# Patient Record
Sex: FEMALE | Race: WHITE | NOT HISPANIC OR LATINO | Employment: OTHER | URBAN - METROPOLITAN AREA
[De-identification: names, ages, dates, MRNs, and addresses within clinical notes are randomized per-mention and may not be internally consistent; named-entity substitution may affect disease eponyms.]

---

## 2017-02-02 ENCOUNTER — HISTORICAL (OUTPATIENT)
Dept: NEUROSURGERY | Facility: CLINIC | Age: 72
End: 2017-02-02

## 2017-02-02 ENCOUNTER — HISTORICAL (OUTPATIENT)
Dept: ADMINISTRATIVE | Facility: HOSPITAL | Age: 72
End: 2017-02-02

## 2017-03-02 ENCOUNTER — HISTORICAL (OUTPATIENT)
Dept: NEUROSURGERY | Facility: CLINIC | Age: 72
End: 2017-03-02

## 2017-03-02 ENCOUNTER — HISTORICAL (OUTPATIENT)
Dept: ADMINISTRATIVE | Facility: HOSPITAL | Age: 72
End: 2017-03-02

## 2017-03-09 ENCOUNTER — HISTORICAL (OUTPATIENT)
Dept: NEUROSURGERY | Facility: CLINIC | Age: 72
End: 2017-03-09

## 2017-03-09 ENCOUNTER — HISTORICAL (OUTPATIENT)
Dept: ADMINISTRATIVE | Facility: HOSPITAL | Age: 72
End: 2017-03-09

## 2017-04-11 ENCOUNTER — HISTORICAL (OUTPATIENT)
Dept: RADIOLOGY | Facility: HOSPITAL | Age: 72
End: 2017-04-11

## 2017-04-12 ENCOUNTER — HISTORICAL (OUTPATIENT)
Dept: INTENSIVE CARE | Facility: HOSPITAL | Age: 72
End: 2017-04-12

## 2017-06-08 ENCOUNTER — HISTORICAL (OUTPATIENT)
Dept: NEUROSURGERY | Facility: CLINIC | Age: 72
End: 2017-06-08

## 2017-06-08 ENCOUNTER — HISTORICAL (OUTPATIENT)
Dept: ADMINISTRATIVE | Facility: HOSPITAL | Age: 72
End: 2017-06-08

## 2017-06-08 LAB
ABS NEUT (OLG): 4.33 X10(3)/MCL (ref 2.1–9.2)
APTT PPP: 27.5 SECOND(S) (ref 20.6–36)
BASOPHILS # BLD AUTO: 0 X10(3)/MCL (ref 0–0.2)
BASOPHILS NFR BLD AUTO: 0 %
BUN SERPL-MCNC: 24 MG/DL (ref 7–18)
CALCIUM SERPL-MCNC: 8.7 MG/DL (ref 8.5–10.1)
CHLORIDE SERPL-SCNC: 99 MMOL/L (ref 98–107)
CO2 SERPL-SCNC: 33 MMOL/L (ref 21–32)
CREAT SERPL-MCNC: 0.92 MG/DL (ref 0.55–1.02)
EOSINOPHIL # BLD AUTO: 0.1 X10(3)/MCL (ref 0–0.9)
EOSINOPHIL NFR BLD AUTO: 2 %
ERYTHROCYTE [DISTWIDTH] IN BLOOD BY AUTOMATED COUNT: 17.9 % (ref 11.5–17)
GLUCOSE SERPL-MCNC: 99 MG/DL (ref 74–106)
HCT VFR BLD AUTO: 31.3 % (ref 37–47)
HGB BLD-MCNC: 9.6 GM/DL (ref 12–16)
INR PPP: 0.98 (ref 0–1.27)
LYMPHOCYTES # BLD AUTO: 1.3 X10(3)/MCL (ref 0.6–4.6)
LYMPHOCYTES NFR BLD AUTO: 20 %
MCH RBC QN AUTO: 21.4 PG (ref 27–31)
MCHC RBC AUTO-ENTMCNC: 30.7 GM/DL (ref 33–36)
MCV RBC AUTO: 69.9 FL (ref 80–94)
MONOCYTES # BLD AUTO: 0.7 X10(3)/MCL (ref 0.1–1.3)
MONOCYTES NFR BLD AUTO: 11 %
NEUTROPHILS # BLD AUTO: 4.33 X10(3)/MCL (ref 1.4–7.9)
NEUTROPHILS NFR BLD AUTO: 67 %
PLATELET # BLD AUTO: 184 X10(3)/MCL (ref 130–400)
PMV BLD AUTO: 9.5 FL (ref 9.4–12.4)
POTASSIUM SERPL-SCNC: 3.6 MMOL/L (ref 3.5–5.1)
PROTHROMBIN TIME: 12.8 SECOND(S) (ref 12.1–14.2)
RBC # BLD AUTO: 4.48 X10(6)/MCL (ref 4.2–5.4)
SODIUM SERPL-SCNC: 139 MMOL/L (ref 136–145)
WBC # SPEC AUTO: 6.4 X10(3)/MCL (ref 4.5–11.5)

## 2017-06-15 ENCOUNTER — HISTORICAL (OUTPATIENT)
Dept: NEUROSURGERY | Facility: CLINIC | Age: 72
End: 2017-06-15

## 2017-06-15 ENCOUNTER — HISTORICAL (OUTPATIENT)
Dept: ADMINISTRATIVE | Facility: HOSPITAL | Age: 72
End: 2017-06-15

## 2017-06-22 ENCOUNTER — HISTORICAL (OUTPATIENT)
Dept: ADMINISTRATIVE | Facility: HOSPITAL | Age: 72
End: 2017-06-22

## 2017-06-22 ENCOUNTER — HISTORICAL (OUTPATIENT)
Dept: NEUROSURGERY | Facility: CLINIC | Age: 72
End: 2017-06-22

## 2018-04-26 ENCOUNTER — HISTORICAL (OUTPATIENT)
Dept: ADMINISTRATIVE | Facility: HOSPITAL | Age: 73
End: 2018-04-26

## 2019-11-26 ENCOUNTER — HISTORICAL (OUTPATIENT)
Dept: SURGERY | Facility: HOSPITAL | Age: 74
End: 2019-11-26

## 2019-11-26 LAB
BUN SERPL-MCNC: 14 MG/DL (ref 7–18)
CALCIUM SERPL-MCNC: 9.2 MG/DL (ref 8.5–10.1)
CHLORIDE SERPL-SCNC: 107 MMOL/L (ref 98–107)
CO2 SERPL-SCNC: 28 MMOL/L (ref 21–32)
CREAT SERPL-MCNC: 0.67 MG/DL (ref 0.55–1.02)
CREAT/UREA NIT SERPL: 20.9
GLUCOSE SERPL-MCNC: 93 MG/DL (ref 74–106)
HCT VFR BLD AUTO: 35.1 % (ref 37–47)
HGB BLD-MCNC: 10.6 GM/DL (ref 12–16)
INR PPP: 1 (ref 0–1.3)
MICROCYTES BLD QL SMEAR: NORMAL
PLATELET # BLD AUTO: 155 X10(3)/MCL (ref 130–400)
PLATELET # BLD EST: NORMAL 10*3/UL
POTASSIUM SERPL-SCNC: 3.9 MMOL/L (ref 3.5–5.1)
PROTHROMBIN TIME: 13.3 SECOND(S) (ref 12–14)
SODIUM SERPL-SCNC: 142 MMOL/L (ref 136–145)

## 2020-01-29 ENCOUNTER — HISTORICAL (OUTPATIENT)
Dept: ADMINISTRATIVE | Facility: HOSPITAL | Age: 75
End: 2020-01-29

## 2020-01-29 LAB
AMPHET UR QL SCN: NEGATIVE
BARBITURATE SCN PRESENT UR: NEGATIVE
BENZODIAZ UR QL SCN: NEGATIVE
CANNABINOIDS UR QL SCN: NEGATIVE
COCAINE UR QL SCN: NEGATIVE
OPIATES UR QL SCN: POSITIVE
PCP UR QL: NEGATIVE
PH UR STRIP.AUTO: 5.5 [PH] (ref 5–7.5)
SP GR FLD REFRACTOMETRY: 1.02 (ref 1–1.03)

## 2020-02-11 LAB
ABS NEUT (OLG): 3.32 X10(3)/MCL (ref 2.1–9.2)
ANISOCYTOSIS BLD QL SMEAR: 1
BASOPHILS # BLD AUTO: 0 X10(3)/MCL (ref 0–0.2)
BASOPHILS NFR BLD AUTO: 0 %
BUN SERPL-MCNC: 20 MG/DL (ref 7–18)
CALCIUM SERPL-MCNC: 8.8 MG/DL (ref 8.5–10.1)
CHLORIDE SERPL-SCNC: 105 MMOL/L (ref 98–107)
CO2 SERPL-SCNC: 33 MMOL/L (ref 21–32)
CREAT SERPL-MCNC: 0.76 MG/DL (ref 0.55–1.02)
CREAT/UREA NIT SERPL: 26.3
DACRYOCYTES BLD QL SMEAR: 1 /MCL (ref 0–1)
EOSINOPHIL # BLD AUTO: 0.3 X10(3)/MCL (ref 0–0.9)
EOSINOPHIL NFR BLD AUTO: 5 %
ERYTHROCYTE [DISTWIDTH] IN BLOOD BY AUTOMATED COUNT: 16.4 % (ref 11.5–17)
GLUCOSE SERPL-MCNC: 94 MG/DL (ref 74–106)
HCT VFR BLD AUTO: 34.2 % (ref 37–47)
HGB BLD-MCNC: 10 GM/DL (ref 12–16)
HYPOCHROMIA BLD QL SMEAR: 1
INR PPP: 1.1 (ref 0–1.3)
LYMPHOCYTES # BLD AUTO: 1.5 X10(3)/MCL (ref 0.6–4.6)
LYMPHOCYTES NFR BLD AUTO: 26 %
MACROCYTES BLD QL SMEAR: 1 /MCL
MCH RBC QN AUTO: 20.4 PG (ref 27–31)
MCHC RBC AUTO-ENTMCNC: 29.2 GM/DL (ref 33–36)
MCV RBC AUTO: 69.8 FL (ref 80–94)
MONOCYTES # BLD AUTO: 0.6 X10(3)/MCL (ref 0.1–1.3)
MONOCYTES NFR BLD AUTO: 10 %
NEUTROPHILS # BLD AUTO: 3.32 X10(3)/MCL (ref 2.1–9.2)
NEUTROPHILS NFR BLD AUTO: 58 %
OVALOCYTES BLD QL SMEAR: 1 (ref 0–0)
PLATELET # BLD AUTO: 156 X10(3)/MCL (ref 130–400)
PLATELET # BLD EST: ADEQUATE 10*3/UL
PMV BLD AUTO: 10.6 FL (ref 7.4–10.4)
POIKILOCYTOSIS BLD QL SMEAR: 1
POLYCHROMASIA BLD QL SMEAR: 1
POTASSIUM SERPL-SCNC: 4 MMOL/L (ref 3.5–5.1)
PROTHROMBIN TIME: 13.4 SECOND(S) (ref 12–14)
RBC # BLD AUTO: 4.9 X10(6)/MCL (ref 4.2–5.4)
RBC MORPH BLD: ABNORMAL
SODIUM SERPL-SCNC: 142 MMOL/L (ref 136–145)
TARGETS BLD QL SMEAR: 1
WBC # SPEC AUTO: 5.7 X10(3)/MCL (ref 4.5–11.5)

## 2020-02-18 ENCOUNTER — HISTORICAL (OUTPATIENT)
Dept: SURGERY | Facility: HOSPITAL | Age: 75
End: 2020-02-18

## 2020-03-17 ENCOUNTER — HISTORICAL (OUTPATIENT)
Dept: SURGERY | Facility: HOSPITAL | Age: 75
End: 2020-03-17

## 2020-03-17 LAB
BUN SERPL-MCNC: 14 MG/DL (ref 7–18)
CALCIUM SERPL-MCNC: 9.1 MG/DL (ref 8.5–10.1)
CHLORIDE SERPL-SCNC: 108 MMOL/L (ref 98–107)
CO2 SERPL-SCNC: 30 MMOL/L (ref 21–32)
CREAT SERPL-MCNC: 0.64 MG/DL (ref 0.55–1.02)
CREAT/UREA NIT SERPL: 21.9
GLUCOSE SERPL-MCNC: 96 MG/DL (ref 74–106)
HCT VFR BLD AUTO: 35.3 % (ref 37–47)
HGB BLD-MCNC: 10.5 GM/DL (ref 12–16)
INR PPP: 1.1 (ref 0–1.3)
POTASSIUM SERPL-SCNC: 4 MMOL/L (ref 3.5–5.1)
PROTHROMBIN TIME: 13.9 SECOND(S) (ref 11.1–13.7)
SODIUM SERPL-SCNC: 143 MMOL/L (ref 136–145)

## 2020-09-22 ENCOUNTER — HISTORICAL (OUTPATIENT)
Dept: SURGERY | Facility: HOSPITAL | Age: 75
End: 2020-09-22

## 2020-09-22 LAB
BUN SERPL-MCNC: 10.3 MG/DL (ref 9.8–20.1)
CALCIUM SERPL-MCNC: 8.7 MG/DL (ref 8.4–10.2)
CHLORIDE SERPL-SCNC: 101 MMOL/L (ref 98–107)
CO2 SERPL-SCNC: 33 MMOL/L (ref 23–31)
CREAT SERPL-MCNC: 0.74 MG/DL (ref 0.55–1.02)
CREAT/UREA NIT SERPL: 14
GLUCOSE SERPL-MCNC: 95 MG/DL (ref 82–115)
HCT VFR BLD AUTO: 34 % (ref 37–47)
HGB BLD-MCNC: 10.3 GM/DL (ref 12–16)
PLATELET # BLD AUTO: 167 X10(3)/MCL (ref 130–400)
POTASSIUM SERPL-SCNC: 3.9 MMOL/L (ref 3.5–5.1)
SODIUM SERPL-SCNC: 141 MMOL/L (ref 136–145)

## 2020-12-07 ENCOUNTER — HISTORICAL (OUTPATIENT)
Dept: RADIOLOGY | Facility: HOSPITAL | Age: 75
End: 2020-12-07

## 2020-12-16 ENCOUNTER — HISTORICAL (OUTPATIENT)
Dept: ADMINISTRATIVE | Facility: HOSPITAL | Age: 75
End: 2020-12-16

## 2020-12-16 LAB
ABS NEUT (OLG): 4.18 X10(3)/MCL (ref 2.1–9.2)
BASOPHILS # BLD AUTO: 0 X10(3)/MCL (ref 0–0.2)
BASOPHILS NFR BLD AUTO: 0 %
EOSINOPHIL # BLD AUTO: 0.2 X10(3)/MCL (ref 0–0.9)
EOSINOPHIL NFR BLD AUTO: 3 %
ERYTHROCYTE [DISTWIDTH] IN BLOOD BY AUTOMATED COUNT: 17.1 % (ref 11.5–17)
HCT VFR BLD AUTO: 33.8 % (ref 37–47)
HGB BLD-MCNC: 10.5 GM/DL (ref 12–16)
IRON SATN MFR SERPL: 22 % (ref 20–50)
IRON SERPL-MCNC: 57 UG/DL (ref 50–170)
LYMPHOCYTES # BLD AUTO: 0.9 X10(3)/MCL (ref 0.6–4.6)
LYMPHOCYTES NFR BLD AUTO: 16 %
MCH RBC QN AUTO: 21.4 PG (ref 27–31)
MCHC RBC AUTO-ENTMCNC: 31.1 GM/DL (ref 33–36)
MCV RBC AUTO: 68.8 FL (ref 80–94)
MONOCYTES # BLD AUTO: 0.4 X10(3)/MCL (ref 0.1–1.3)
MONOCYTES NFR BLD AUTO: 8 %
NEUTROPHILS # BLD AUTO: 4.18 X10(3)/MCL (ref 2.1–9.2)
NEUTROPHILS NFR BLD AUTO: 73 %
PLATELET # BLD AUTO: 160 X10(3)/MCL (ref 130–400)
PMV BLD AUTO: 10.3 FL (ref 9.4–12.4)
RBC # BLD AUTO: 4.91 X10(6)/MCL (ref 4.2–5.4)
TIBC SERPL-MCNC: 204 UG/DL (ref 70–310)
TIBC SERPL-MCNC: 261 UG/DL (ref 250–450)
TRANSFERRIN SERPL-MCNC: 224 MG/DL (ref 173–360)
TSH SERPL-ACNC: 3.12 UIU/ML (ref 0.35–4.94)
WBC # SPEC AUTO: 5.7 X10(3)/MCL (ref 4.5–11.5)

## 2021-02-09 ENCOUNTER — HISTORICAL (OUTPATIENT)
Dept: RADIOLOGY | Facility: HOSPITAL | Age: 76
End: 2021-02-09

## 2021-02-25 LAB
ABS NEUT (OLG): 3.24 X10(3)/MCL (ref 2.1–9.2)
BASOPHILS # BLD AUTO: 0 X10(3)/MCL (ref 0–0.2)
BASOPHILS NFR BLD AUTO: 1 %
BUN SERPL-MCNC: 11 MG/DL (ref 9.8–20.1)
CALCIUM SERPL-MCNC: 8.8 MG/DL (ref 8.4–10.2)
CHLORIDE SERPL-SCNC: 104 MMOL/L (ref 98–107)
CO2 SERPL-SCNC: 29 MMOL/L (ref 23–31)
CREAT SERPL-MCNC: 0.78 MG/DL (ref 0.55–1.02)
CREAT/UREA NIT SERPL: 14
EOSINOPHIL # BLD AUTO: 0.2 X10(3)/MCL (ref 0–0.9)
EOSINOPHIL NFR BLD AUTO: 3 %
ERYTHROCYTE [DISTWIDTH] IN BLOOD BY AUTOMATED COUNT: 17.4 % (ref 11.5–17)
GLUCOSE SERPL-MCNC: 97 MG/DL (ref 82–115)
HCT VFR BLD AUTO: 33 % (ref 37–47)
HGB BLD-MCNC: 10.1 GM/DL (ref 12–16)
IMM GRANULOCYTES # BLD AUTO: 0 10*3/UL
IMM GRANULOCYTES NFR BLD AUTO: 0 %
LYMPHOCYTES # BLD AUTO: 1.4 X10(3)/MCL (ref 0.6–4.6)
LYMPHOCYTES NFR BLD AUTO: 27 %
MCH RBC QN AUTO: 21 PG (ref 27–31)
MCHC RBC AUTO-ENTMCNC: 30.6 GM/DL (ref 33–36)
MCV RBC AUTO: 68.8 FL (ref 80–94)
MONOCYTES # BLD AUTO: 0.4 X10(3)/MCL (ref 0.1–1.3)
MONOCYTES NFR BLD AUTO: 8 %
NEUTROPHILS # BLD AUTO: 3.24 X10(3)/MCL (ref 2.1–9.2)
NEUTROPHILS NFR BLD AUTO: 62 %
PLATELET # BLD AUTO: 151 X10(3)/MCL (ref 130–400)
PMV BLD AUTO: 9.6 FL (ref 9.4–12.4)
POTASSIUM SERPL-SCNC: 4 MMOL/L (ref 3.5–5.1)
RBC # BLD AUTO: 4.8 X10(6)/MCL (ref 4.2–5.4)
SODIUM SERPL-SCNC: 141 MMOL/L (ref 136–145)
WBC # SPEC AUTO: 5.3 X10(3)/MCL (ref 4.5–11.5)

## 2021-03-04 ENCOUNTER — HISTORICAL (OUTPATIENT)
Dept: SURGERY | Facility: HOSPITAL | Age: 76
End: 2021-03-04

## 2021-03-09 ENCOUNTER — HISTORICAL (OUTPATIENT)
Dept: ADMINISTRATIVE | Facility: HOSPITAL | Age: 76
End: 2021-03-09

## 2021-03-09 LAB
ALBUMIN SERPL-MCNC: 3.9 GM/DL (ref 3.4–4.8)
ALBUMIN/GLOB SERPL: 1.5 RATIO (ref 1.1–2)
ALP SERPL-CCNC: 91 UNIT/L (ref 40–150)
ALT SERPL-CCNC: 24 UNIT/L (ref 0–55)
AST SERPL-CCNC: 20 UNIT/L (ref 5–34)
BILIRUB SERPL-MCNC: 0.4 MG/DL
BILIRUBIN DIRECT+TOT PNL SERPL-MCNC: 0.2 MG/DL (ref 0–0.5)
BILIRUBIN DIRECT+TOT PNL SERPL-MCNC: 0.2 MG/DL (ref 0–0.8)
BUN SERPL-MCNC: 11.6 MG/DL (ref 9.8–20.1)
CALCIUM SERPL-MCNC: 8.6 MG/DL (ref 8.4–10.2)
CHLORIDE SERPL-SCNC: 105 MMOL/L (ref 98–107)
CO2 SERPL-SCNC: 30 MMOL/L (ref 23–31)
CREAT SERPL-MCNC: 0.72 MG/DL (ref 0.55–1.02)
GLOBULIN SER-MCNC: 2.6 GM/DL (ref 2.4–3.5)
GLUCOSE SERPL-MCNC: 90 MG/DL (ref 82–115)
POTASSIUM SERPL-SCNC: 3.8 MMOL/L (ref 3.5–5.1)
PROT SERPL-MCNC: 6.5 GM/DL (ref 5.8–7.6)
SODIUM SERPL-SCNC: 142 MMOL/L (ref 136–145)

## 2021-03-18 ENCOUNTER — HISTORICAL (OUTPATIENT)
Dept: RADIOLOGY | Facility: HOSPITAL | Age: 76
End: 2021-03-18

## 2021-04-22 ENCOUNTER — HISTORICAL (OUTPATIENT)
Dept: ADMINISTRATIVE | Facility: HOSPITAL | Age: 76
End: 2021-04-22

## 2021-04-22 LAB
ALBUMIN SERPL-MCNC: 3.8 GM/DL (ref 3.4–4.8)
ALP SERPL-CCNC: 81 UNIT/L (ref 40–150)
ALT SERPL-CCNC: 19 UNIT/L (ref 0–55)
AST SERPL-CCNC: 25 UNIT/L (ref 5–34)
BILIRUB SERPL-MCNC: 0.6 MG/DL
BILIRUBIN DIRECT+TOT PNL SERPL-MCNC: 0.2 MG/DL (ref 0–0.5)
BILIRUBIN DIRECT+TOT PNL SERPL-MCNC: 0.4 MG/DL (ref 0–0.8)
FERRITIN SERPL-MCNC: 130.56 NG/ML (ref 4.63–204)
IRON SATN MFR SERPL: 30 % (ref 20–50)
IRON SERPL-MCNC: 75 UG/DL (ref 50–170)
LIVER PROFILE INTERP: NORMAL
PROT SERPL-MCNC: 5.8 GM/DL (ref 5.8–7.6)
TIBC SERPL-MCNC: 173 UG/DL (ref 70–310)
TIBC SERPL-MCNC: 248 UG/DL (ref 250–450)
TRANSFERRIN SERPL-MCNC: 205 MG/DL (ref 173–360)

## 2021-04-29 ENCOUNTER — HISTORICAL (OUTPATIENT)
Dept: ADMINISTRATIVE | Facility: HOSPITAL | Age: 76
End: 2021-04-29

## 2021-04-29 ENCOUNTER — HISTORICAL (OUTPATIENT)
Dept: SURGERY | Facility: HOSPITAL | Age: 76
End: 2021-04-29

## 2021-04-29 LAB
BUN SERPL-MCNC: 12.6 MG/DL (ref 9.8–20.1)
CALCIUM SERPL-MCNC: 9.2 MG/DL (ref 8.4–10.2)
CHLORIDE SERPL-SCNC: 110 MMOL/L (ref 98–107)
CO2 SERPL-SCNC: 27 MMOL/L (ref 23–31)
COLOR STL: NORMAL
CONSISTENCY STL: NORMAL
CREAT SERPL-MCNC: 0.76 MG/DL (ref 0.55–1.02)
CREAT/UREA NIT SERPL: 17
GLUCOSE SERPL-MCNC: 105 MG/DL (ref 82–115)
HCT VFR BLD AUTO: 33.2 % (ref 37–47)
HEMOCCULT SP1 STL QL: NEGATIVE
HEMOCCULT SP2 STL QL: NEGATIVE
HGB BLD-MCNC: 10 GM/DL (ref 12–16)
INR PPP: 1 (ref 0–1.3)
PLATELET # BLD AUTO: 149 X10(3)/MCL (ref 130–400)
POTASSIUM SERPL-SCNC: 4.7 MMOL/L (ref 3.5–5.1)
PROTHROMBIN TIME: 13.3 SECOND(S) (ref 11.1–13.7)
SODIUM SERPL-SCNC: 144 MMOL/L (ref 136–145)

## 2021-05-06 ENCOUNTER — HISTORICAL (OUTPATIENT)
Dept: SURGERY | Facility: HOSPITAL | Age: 76
End: 2021-05-06

## 2021-07-12 LAB
ABS NEUT (OLG): 4 X10(3)/MCL (ref 2.1–9.2)
BASOPHILS # BLD AUTO: 0 X10(3)/MCL (ref 0–0.2)
BASOPHILS NFR BLD AUTO: 1 %
BUN SERPL-MCNC: 15.5 MG/DL (ref 9.8–20.1)
CALCIUM SERPL-MCNC: 9 MG/DL (ref 8.4–10.2)
CHLORIDE SERPL-SCNC: 104 MMOL/L (ref 98–107)
CO2 SERPL-SCNC: 30 MMOL/L (ref 23–31)
CREAT SERPL-MCNC: 1.02 MG/DL (ref 0.55–1.02)
CREAT/UREA NIT SERPL: 15
EOSINOPHIL # BLD AUTO: 0.2 X10(3)/MCL (ref 0–0.9)
EOSINOPHIL NFR BLD AUTO: 4 %
ERYTHROCYTE [DISTWIDTH] IN BLOOD BY AUTOMATED COUNT: 17.1 % (ref 11.5–17)
GLUCOSE SERPL-MCNC: 108 MG/DL (ref 82–115)
HCT VFR BLD AUTO: 34.6 % (ref 37–47)
HGB BLD-MCNC: 10.5 GM/DL (ref 12–16)
LYMPHOCYTES # BLD AUTO: 1 X10(3)/MCL (ref 0.6–4.6)
LYMPHOCYTES NFR BLD AUTO: 18 %
MCH RBC QN AUTO: 21.4 PG (ref 27–31)
MCHC RBC AUTO-ENTMCNC: 30.3 GM/DL (ref 33–36)
MCV RBC AUTO: 70.6 FL (ref 80–94)
MONOCYTES # BLD AUTO: 0.4 X10(3)/MCL (ref 0.1–1.3)
MONOCYTES NFR BLD AUTO: 8 %
NEUTROPHILS # BLD AUTO: 4 X10(3)/MCL (ref 2.1–9.2)
NEUTROPHILS NFR BLD AUTO: 70 %
PLATELET # BLD AUTO: 150 X10(3)/MCL (ref 130–400)
PMV BLD AUTO: 10.8 FL (ref 9.4–12.4)
POTASSIUM SERPL-SCNC: 4.2 MMOL/L (ref 3.5–5.1)
RBC # BLD AUTO: 4.9 X10(6)/MCL (ref 4.2–5.4)
SODIUM SERPL-SCNC: 142 MMOL/L (ref 136–145)
WBC # SPEC AUTO: 5.7 X10(3)/MCL (ref 4.5–11.5)

## 2021-07-13 ENCOUNTER — HISTORICAL (OUTPATIENT)
Dept: SURGERY | Facility: HOSPITAL | Age: 76
End: 2021-07-13

## 2022-03-25 ENCOUNTER — HISTORICAL (OUTPATIENT)
Dept: PREADMISSION TESTING | Facility: HOSPITAL | Age: 77
End: 2022-03-25

## 2022-03-25 ENCOUNTER — HISTORICAL (OUTPATIENT)
Dept: ADMINISTRATIVE | Facility: HOSPITAL | Age: 77
End: 2022-03-25

## 2022-03-25 LAB
ABS NEUT (OLG): 5.74 (ref 2.1–9.2)
BASOPHILS # BLD AUTO: 0 10*3/UL (ref 0–0.2)
BASOPHILS NFR BLD AUTO: 1 %
BUN SERPL-MCNC: 18 MG/DL (ref 9.8–20.1)
CALCIUM SERPL-MCNC: 8.9 MG/DL (ref 8.7–10.5)
CHLORIDE SERPL-SCNC: 109 MMOL/L (ref 98–107)
CO2 SERPL-SCNC: 29 MMOL/L (ref 23–31)
CREAT SERPL-MCNC: 0.78 MG/DL (ref 0.55–1.02)
CREAT/UREA NIT SERPL: 23
EOSINOPHIL # BLD AUTO: 0.1 10*3/UL (ref 0–0.9)
EOSINOPHIL NFR BLD AUTO: 2 %
ERYTHROCYTE [DISTWIDTH] IN BLOOD BY AUTOMATED COUNT: 18 % (ref 11.5–17)
GLUCOSE SERPL-MCNC: 91 MG/DL (ref 82–115)
HCT VFR BLD AUTO: 36.7 % (ref 37–47)
HEMOLYSIS INTERF INDEX SERPL-ACNC: 2
HGB BLD-MCNC: 11.1 G/DL (ref 12–16)
ICTERIC INTERF INDEX SERPL-ACNC: 1
LIPEMIC INTERF INDEX SERPL-ACNC: 4
LYMPHOCYTES # BLD AUTO: 1.1 10*3/UL (ref 0.6–4.6)
LYMPHOCYTES NFR BLD AUTO: 14 %
MANUAL DIFF? (OHS): NO
MCH RBC QN AUTO: 20.7 PG (ref 27–31)
MCHC RBC AUTO-ENTMCNC: 30.2 G/DL (ref 33–36)
MCV RBC AUTO: 68.6 FL (ref 80–94)
MONOCYTES # BLD AUTO: 0.6 10*3/UL (ref 0.1–1.3)
MONOCYTES NFR BLD AUTO: 7 %
NEUTROPHILS # BLD AUTO: 5.74 10*3/UL (ref 2.1–9.2)
NEUTROPHILS NFR BLD AUTO: 76 %
PLATELET # BLD AUTO: 168 10*3/UL (ref 130–400)
PMV BLD AUTO: 9.4 FL (ref 9.4–12.4)
POS ERR1 (OHS): NORMAL
POTASSIUM SERPL-SCNC: 4.1 MMOL/L (ref 3.5–5.1)
RBC # BLD AUTO: 5.35 10*6/UL (ref 4.2–5.4)
SODIUM SERPL-SCNC: 145 MMOL/L (ref 136–145)
WBC # SPEC AUTO: 7.6 10*3/UL (ref 4.5–11.5)

## 2022-03-29 ENCOUNTER — HISTORICAL (OUTPATIENT)
Dept: SURGERY | Facility: HOSPITAL | Age: 77
End: 2022-03-29

## 2022-04-11 ENCOUNTER — HISTORICAL (OUTPATIENT)
Dept: ADMINISTRATIVE | Facility: HOSPITAL | Age: 77
End: 2022-04-11
Payer: MEDICARE

## 2022-04-28 VITALS
BODY MASS INDEX: 22.19 KG/M2 | HEIGHT: 63 IN | WEIGHT: 125.25 LBS | SYSTOLIC BLOOD PRESSURE: 148 MMHG | DIASTOLIC BLOOD PRESSURE: 78 MMHG

## 2022-04-30 NOTE — OP NOTE
Patient:   Elizabeth Bruno             MRN: [id number]            FIN: [id number]               Age:   75 years     Sex:  Female     :  1945   Associated Diagnoses:   None   Author:   Yusuf Calero MD      DATE OF SURGERY:    2020    SURGEON:  Yusuf Calero MD    PREOPERATIVE DIAGNOSIS:  Lumbar radiculopathy.    POSTOPERATIVE DIAGNOSIS:  Lumbar radiculopathy.    PROCEDURE PERFORMED:  Fluoroscopically-guided transforaminal lumbar epidural injections at left L3-4 and left L5-S1.    EQUIPMENT USED:  Epidural tray.    DESCRIPTION OF PROCEDURE:  Following informed consent, the patient was prepped and draped in the usual sterile fashion.  I infiltrated the tissue overlying L3-4 and L5-S1 with local anesthetic.  Under fluoroscopic guidance, I advanced a 22-gauge 3.5 inch BD spinal needle into the epidural space via left transforaminal approaches.  Positioning was confirmed at each location with administration of contrast.  I then instilled divided between the 2 needles a combination of 160 mg Depo-Medrol and 1 mL of 5% dextrose in water.  I removed the needles and applied sterile dressings.  The patient tolerated the procedure well without apparent complication.    IMPRESSION:  Successful fluoroscopically-guided transforaminal lumbar epidural injection at left L3-4 and left L5-S1.

## 2022-04-30 NOTE — OP NOTE
Patient:   Elizabeth Bruno             MRN: [id number]            FIN: [id number]               Age:   76 years     Sex:  Female     :  1945   Associated Diagnoses:   None   Author:   Yusuf Calero MD      DATE OF SURGERY:    21    SURGEON:  Yusuf Calero MD    PREOPERATIVE DIAGNOSIS:  Lumbar spondylosis.    POSTOPERATIVE DIAGNOSIS:  Lumbar spondylosis.    PROCEDURE PERFORMED:  Fluoroscopically-guided medial branch blocks at bilateral  L5, S1    EQUIPMENT USED:  Epidural tray.    DESCRIPTION OF PROCEDURE:  Following informed consent, the patient was prepped and draped in the usual sterile fashion.  I infiltrated the tissue overlying L5, S1 with local anesthetic.  Under fluoroscopic guidance, I advanced four 22-gauge 3.5 inch BD spinal needles, performing medial branch blocks using 160mg 2% lidocaine.  I removed the needles and applied sterile dressings.  The patient tolerated the procedure well without apparent complication.    IMPRESSION:  Successful fluoroscopically-guided medial branch blocks at bilateral L5, S1

## 2022-04-30 NOTE — OP NOTE
Patient:   Elizabeth Bruno             MRN: [id number]            FIN: [id number]               Age:   74 years     Sex:  Female     :  1945   Associated Diagnoses:   None   Author:   Yusuf Calero MD      DATE OF SURGERY:  19        SURGEON:  Yusuf Calero MD    PREOPERATIVE DIAGNOSIS:  Cervical radiculopathy.    POSTOPERATIVE DIAGNOSE:  Cervical radiculopathy.    PROCEDURE PERFORMED:  Fluoroscopically-guided intralaminar cervical epidural injection at C5-6.    EQUIPMENT USED:  Epidural tray.    DETAILED DESCRIPTION:  Following informed consent, the patient was prepped and draped in the usual sterile fashion.  I infiltrated the tissue overlying C5-6 with local anesthetic.  Under fluoroscopic guidance, I advanced a 25-gauge 3.5 inch BD spinal needle into the epidural space.  Positioning was confirmed with administration of contrast.  I then instilled a combination of 160 mg Depo-Medrol and 1 mL of 5% dextrose in water.  I removed the needle and applied a sterile dressing.  The patient tolerated the procedure well without apparent complication.    IMPRESSION:  Successful fluoroscopically-guided intralaminar cervical epidural injection at C5-6.

## 2022-04-30 NOTE — OP NOTE
Patient:   Elizabeth Bruno             MRN: [id number]            FIN: [id number]               Age:   74 years     Sex:  Female     :  1945   Associated Diagnoses:   None   Author:   Yusuf Calero MD      DATE OF SURGERY:    2019    SURGEON:  Yusuf Calero MD    PREOPERATIVE DIAGNOSIS:  Lumbar radiculopathy.    POSTOPERATIVE DIAGNOSIS:  Lumbar radiculopathy.    PROCEDURE PERFORMED:  Fluoroscopically-guided transforaminal lumbar epidural injections at left L3-4 and left L5-S1.    EQUIPMENT USED:  Epidural tray.    DESCRIPTION OF PROCEDURE:  Following informed consent, the patient was prepped and draped in the usual sterile fashion.  I infiltrated the tissue overlying L3-4 and L5-S1 with local anesthetic.  Under fluoroscopic guidance, I advanced a 22-gauge 3.5 inch BD spinal needle into the epidural space via left transforaminal approaches.  Positioning was confirmed at each location with administration of contrast.  I then instilled divided between the 2 needles a combination of 160 mg Depo-Medrol and 1 mL of 5% dextrose in water.  I removed the needles and applied sterile dressings.  The patient tolerated the procedure well without apparent complication.    IMPRESSION:  Successful fluoroscopically-guided transforaminal lumbar epidural injection at left L3-4 and left L5-S1.

## 2022-04-30 NOTE — OP NOTE
DATE OF SURGERY:    06/22/2017    SURGEON:  Yusuf Calero MD    PREOPERATIVE DIAGNOSIS:  Lumbosacral pain.    POSTOPERATIVE DIAGNOSIS:  Lumbosacral pain.    PROCEDURE PERFORMED:  Fluoroscopically-guided transforaminal lumbar epidural injections at bilateral L5-S1.    EQUIPMENT USED:  Epidural tray.    DETAILED DESCRIPTION:  Following informed consent, the patient was prepped and draped in the usual sterile fashion.  I infiltrated the tissue overlying L5-S1 with local anesthetic.  Under fluoroscopic guidance, I advanced a 22-gauge 3.5 inch BD spinal needle in the epidural space via bilateral transforaminal approaches.  Positioning was confirmed at each location with administration of contrast.  I then instilled, divided between the two needles, a combination of 160 mg Depo-Medrol and 1 mL of 5% dextrose in water.  I removed the needles and applied a sterile dressing.  The patient tolerated the procedure well.  No apparent complication.    IMPRESSION:  Successful fluoroscopically-guided transforaminal lumbar epidural injections at bilateral L5-S1.        ______________________________  Yusuf Calero MD    DP/UD  DD:  06/22/2017  Time:  08:50AM  DT:  06/22/2017  Time:  10:22AM  Job #:  [id number]

## 2022-04-30 NOTE — OP NOTE
DATE OF SURGERY:    02/18/2020    SURGEON:  Yusuf Calero MD    PREOPERATIVE DIAGNOSIS:  Chronic pain syndrome.    POSTOPERATIVE DIAGNOSIS:  Chronic pain syndrome.    PROCEDURE PERFORMED:  Fluoroscopically-guided placement of 2 spinal cord stimulator leads under anesthesia for programming and trial.    EQUIPMENT USED:  Procyrionro stimulator kit.    DETAILED DESCRIPTION:  Following informed consent, and with the patient under general endotracheal anesthesia, she was prepped and draped in the usual sterile fashion.  I infiltrated the tissue overlying L2-3 with local anesthetic.  Under fluoroscopic guidance, I entered the epidural space at L1-2 using a 14-gauge Tuohy curved tip spinal needle. I introduced stimulator lead and advanced it to the top of T8.  I then infiltrated the tissue overlying T2-3 with local anesthetic.  Under fluoroscopic guidance, attempts were made at T2-3 and T3-4 to access the epidural space, but the lead did not track well in this location.  I then infiltrated the tissue overlying T11-12 with local anesthetic.  Under fluoroscopic guidance, I entered the epidural space at T11-12 with a 14-gauge Touhy curved tip spinal needle.  I introduced the stimulator lead and advanced it to the top of C2.  AP and lateral views demonstrated good lead positioning.  The needle and stylet were carefully removed while keeping this lead in place.  I then removed the lead at T8 and attempted epidural puncture again at T11-12, entering the epidural space with the lead.  Good tracking was not achieved at this level.  I then entered the epidural space at L1-2 with the Touhy needle.  I introduced the stimulator lead and advanced it to the top of T8.  Final AP and lateral views demonstrated good lead positioning.  I carefully removed the stylet and needle while keeping the lead in place.  The leads were then connected to the generator for programming.  Everything was secured with sterile dressing.  The patient  tolerated the procedure well.  There were no apparent complications.        ______________________________  MD CHARLIE Rosa/SK  DD:  02/18/2020  Time:  12:29PM  DT:  02/18/2020  Time:  12:46PM  Job #:  [id number]

## 2022-04-30 NOTE — OP NOTE
DATE OF SURGERY:    06/08/2017    SURGEON:  Yusuf Calero MD    PREOPERATIVE DIAGNOSIS:  Lumbosacral pain.    POSTOPERATIVE DIAGNOSIS:  Lumbosacral pain.    PROCEDURE:  Fluoroscopically guided transforaminal lumbar epidural injections at bilateral L5-S1.    EQUIPMENT USED:  Epidural tray.    DETAILED DESCRIPTION:  Following informed consent, the patient was prepped and draped in the usual sterile fashion.  I infiltrated the tissue overlying L5-S1 with local anesthetic.  Under fluoroscopic guidance, I advanced a 22 gauge 3.5 inch BD spinal needle into the epidural space via bilateral transforaminal approaches.  Positioning was confirmed with administration of contrast.  I then instilled divided between the two needles a combination of 160 mg Depo-Medrol and 1 ml of 5% dextrose in water.  I removed the needle and applied a sterile dressing.  The patient tolerated the procedure well without apparent complication.    IMPRESSION:  Successful fluoroscopically guided transforaminal lumbar epidural injections at bilateral L5-S1.        ______________________________  MD CHARLIE Rosa/TRUDY  DD:  06/08/2017  Time:  09:02AM  DT:  06/08/2017  Time:  11:46AM  Job #:  [id number]

## 2022-04-30 NOTE — OP NOTE
Patient:   Elizabeth Bruno             MRN: [id number]            FIN: [id number]               Age:   75 years     Sex:  Female     :  1945   Associated Diagnoses:   None   Author:   Yusuf Calero MD      DATE OF SURGERY:  20      SURGEON:  Yusuf Calero MD    PREOPERATIVE DIAGNOSIS:  Chronic pain syndrome.    POSTOPERATIVE DIAGNOSIS:  Chronic pain syndrome.    PROCEDURES PERFORMED:    1. Percutaneous implantation of neurostimulator electrode arrays.  2. Insertion of spinal neurostimulator pulse generator.  3. Electronic analysis of implanted neurostimulator pulse generator system with intraoperative and subsequent programming.    COMPLICATIONS:  None.    INDICATIONS FOR PROCEDURE:  This is a  75 year-old female with a history of chronic pain syndrome, who successfully underwent spinal cord stimulator trial and elected to proceed with implantation.    EQUIPMENT USED:  [employer] stimulator kit.    DETAILED DESCRIPTION:  Following informed consent and with the patient under general endotracheal anesthesia, he was prepped and draped in the usual sterile fashion.  The tissue was infiltrated with local anesthetic.  Under fluoroscopic guidance, attempts were made at multiple levels to access the epidural space.  Ultimately, I advanced a 14-gauge Tuohy curved-tip spinal needle entering the epidural space at T11-12.  I introduced the stimulator lead and advanced it to the top of C2.  An incision was made over the needle.  Hemostasis was achieved.  The stylet and needle were carefully removed while keeping the lead in place.  An anchoring attachment was placed over the lead, and this was secured with 0 silk suture.  A second 14-gauge Touhy curved-tip spinal needle was then introduced and used to enter into the epidural space at T11-12.  A second stimulator lead was introduced and advanced to the top of T8.  The stylet and needle were carefully removed while keeping the lead in place.  An  anchoring attachment was placed over this lead, and this was secured with 0 silk suture.   The anchoring attachments were then tightened with the supplied screwdriver.  Final fluoroscopic AP and lateral views were obtained of the leads to confirm placement.  A left paramedian incision was then made and dissection was carried down approximately 1 cm deep, using a finger sweep technique to create a pocket.  The neurostimulator pulse generator was temporarily placed in the pocket to confirm positioning.  This was removed and cleaned.  The leads were tunneled subcutaneously to the pocket.  These were connected to the neurostimulator pulse generator, and analysis of the generator confirmed proper functioning.  The leads connected to the generator were secured using the supplied screwdriver.  The neurostimulator pulse generator was placed in the pocket, and both wounds were copiously irrigated with Ancef and bacitracin.  Latoya was applied to the wounds.  Standard layer closure was performed at both sites with one layer of 0 Vicryl followed by one layer of Stratafix.  Exofin was applied to the wounds.  The patient tolerated the procedure well.  There were no apparent complications.      ______________________________  Yusuf Calero MD

## 2022-04-30 NOTE — OP NOTE
DATE OF SURGERY:    06/15/2017    SURGEON:  Yusuf Calero MD    PREOPERATIVE DIAGNOSIS:  Lumbosacral pain pain.    POSTOPERATIVE DIAGNOSIS:  Lumbosacral pain pain.    PROCEDURE:  Fluoroscopically guided transforaminal lumbar epidural injections at bilateral L5-S1.    EQUIPMENT USED:  Epidural tray.    PROCEDURE IN DETAIL:  Following informed consent, the patient was prepped and draped in the usual sterile fashion.  I infiltrated the tissue overlying L5-S1 with local anesthetic.  Under fluoroscopic guidance, I advanced a 22-gauge, 3.5 inch BD spinal needle into the epidural space via bilateral transforaminal approaches.  Positioning was confirmed at each location with administration of contrast.  I then instilled, divided between the two needles, a combination of 160 mg of Depo-Medrol and 1 ml of 5% dextrose in water.  I removed the needles and applied a sterile dressing.  The patient tolerated the procedure well without apparent complication.    IMPRESSION:  Successful fluoroscopically guided transforaminal lumbar epidural injections at bilateral L5-S1.        ______________________________  Yusuf Calero MD    DP/CS  DD:  06/15/2017  Time:  09:25AM  DT:  06/15/2017  Time:  01:48PM  Job #:  [id number]

## 2022-04-30 NOTE — OP NOTE
Patient:   Elizabeth Bruno             MRN: [id number]            FIN: [id number]               Age:   76 years     Sex:  Female     :  1945   Associated Diagnoses:   None   Author:   Yusuf Calero MD      DATE OF SURGERY:    21    SURGEON:  Yusuf Calero MD    PREOPERATIVE DIAGNOSIS:  Lumbar spondylosis.    POSTOPERATIVE DIAGNOSIS:  Lumbar spondylosis.    PROCEDURE PERFORMED:  Fluoroscopically-guided medial branch blocks at bilateral  L5, S1    EQUIPMENT USED:  Epidural tray.    DESCRIPTION OF PROCEDURE:  Following informed consent, the patient was prepped and draped in the usual sterile fashion.  I infiltrated the tissue overlying L5, S1 with local anesthetic.  Under fluoroscopic guidance, I advanced four 22-gauge 3.5 inch BD spinal needles, performing medial branch blocks using 160mg 2% lidocaine.  I removed the needles and applied sterile dressings.  The patient tolerated the procedure well without apparent complication.    IMPRESSION:  Successful fluoroscopically-guided medial branch blocks at bilateral L5, S1

## 2022-04-30 NOTE — OP NOTE
Patient:   Elizabeth Bruno             MRN: [id number]            FIN: [id number]               Age:   76 years     Sex:  Female     :  1945   Associated Diagnoses:   None   Author:   Yusuf Calero MD      DATE OF SURGERY:    2021    SURGEON:  Yusuf Calero MD    PREOPERATIVE DIAGNOSIS:  Lumbar radiculopathy.    POSTOPERATIVE DIAGNOSIS:  Lumbar radiculopathy.    PROCEDURE PERFORMED:  Fluoroscopically-guided transforaminal lumbar epidural injections at left L1-2,  left L3-4 and left L5-S1.    EQUIPMENT USED:  Epidural tray.    DESCRIPTION OF PROCEDURE:  Following informed consent, the patient was prepped and draped in the usual sterile fashion.  I infiltrated the tissue overlying L1-2,  left L3-4 and left L5-S1 with local anesthetic.  Under fluoroscopic guidance, I advanced a 22-gauge 3.5 inch BD spinal needle into the epidural space via left transforaminal approaches.  Positioning was confirmed at each location with administration of contrast.  I then instilled divided between the 3 needles a combination of 160 mg Depo-Medrol and 1 mL of 5% dextrose in water.  I removed the needles and applied sterile dressings.  The patient tolerated the procedure well without apparent complication.    IMPRESSION:  Successful fluoroscopically-guided transforaminal lumbar epidural injection at left L1-2,  left L3-4 and left L5-S1.

## 2022-04-30 NOTE — OP NOTE
Patient:   Elizabeth Bruno             MRN: [id number]            FIN: [id number]               Age:   76 years     Sex:  Female     :  1945   Associated Diagnoses:   None   Author:   Yusuf Calero MD      DATE OF SURGERY:    21    SURGEON:  Yusuf Calero MD    PREOPERATIVE DIAGNOSIS:  Lumbar spondylosis.    POSTOPERATIVE DIAGNOSIS:  Lumbar spondylosis.    PROCEDURE:  Fluoroscopically guided radiofrequency ablation at bilateral L5, S1.    PROCEDURE IN DETAIL:  Following informed consent, and with the patient under general anesthesia, the patient was prepped and draped in usual sterile fashion.  The skin and subcutaneous tissue were anesthetized.  Following this I used an 18 gauge angulated 100 mm exposed tip needle at each level, correctly placing them under fluoroscopic guidance at the junction of the SAP and transverse process.  After stimulating the medial branch nerve at each level without evidence of motor signs, I performed radiofrequency ablation of each level, heating the affected nerves to 80 degrees centigrade for 90 seconds.  A total of four sites were heated and treated.  I removed the needles and applied sterile dressings.  The patient tolerated the procedure well without apparent complications.    IMPRESSION:  Successful fluoroscopically guided radiofrequency ablation at bilateral L5, S1.      _____________________________  Yusuf Calero MD

## 2022-05-14 NOTE — OP NOTE
Patient:   Elizabeth Bruno             MRN: [id number]            FIN: [id number]               Age:   77 years     Sex:  Female     :  1945   Associated Diagnoses:   None   Author:   Yusuf Calero MD      DATE OF SURGERY:    22    SURGEON:  Yusuf Calero MD    PREOPERATIVE DIAGNOSIS:  Lumbar spondylosis.    POSTOPERATIVE DIAGNOSIS:  Lumbar spondylosis.    PROCEDURE:  Fluoroscopically guided radiofrequency ablation at bilateral L5, S1.    PROCEDURE IN DETAIL:  Following informed consent, and with the patient under general anesthesia, the patient was prepped and draped in usual sterile fashion.  The skin and subcutaneous tissue were anesthetized.  Following this I used an 18 gauge angulated 150 mm exposed tip needle at each level, correctly placing them under fluoroscopic guidance at the junction of the SAP and transverse process.  After stimulating the medial branch nerve at each level without evidence of motor signs, I performed radiofrequency ablation of each level, heating the affected nerves to 80 degrees centigrade for 90 seconds.  A total of four sites were heated and treated.  I removed the needles and applied sterile dressings.  The patient tolerated the procedure well without apparent complications.    IMPRESSION:  Successful fluoroscopically guided radiofrequency ablation at bilateral L5, S1.      _____________________________  Yusuf Calero MD

## 2022-06-06 ENCOUNTER — TELEPHONE (OUTPATIENT)
Dept: NEUROLOGY | Facility: CLINIC | Age: 77
End: 2022-06-06
Payer: MEDICARE

## 2022-07-25 RX ORDER — HYDROCODONE BITARTRATE AND ACETAMINOPHEN 10; 325 MG/1; MG/1
1 TABLET ORAL EVERY 6 HOURS PRN
COMMUNITY
Start: 2021-07-15 | End: 2022-08-02 | Stop reason: SDUPTHER

## 2022-07-25 NOTE — TELEPHONE ENCOUNTER
Spoke w patient. Informed needs an office visit before med refill. Pt verbalized understanding    04/13/2022

## 2022-07-26 RX ORDER — HYDROCODONE BITARTRATE AND ACETAMINOPHEN 10; 325 MG/1; MG/1
1 TABLET ORAL EVERY 6 HOURS PRN
Qty: 120 TABLET | Refills: 0 | OUTPATIENT
Start: 2022-07-26

## 2022-07-26 RX ORDER — HYDROCODONE BITARTRATE AND ACETAMINOPHEN 10; 325 MG/1; MG/1
1 TABLET ORAL EVERY 6 HOURS PRN
Status: CANCELLED | OUTPATIENT
Start: 2022-07-26

## 2022-08-02 ENCOUNTER — OFFICE VISIT (OUTPATIENT)
Dept: NEUROLOGY | Facility: CLINIC | Age: 77
End: 2022-08-02
Payer: MEDICARE

## 2022-08-02 VITALS
WEIGHT: 134 LBS | SYSTOLIC BLOOD PRESSURE: 132 MMHG | DIASTOLIC BLOOD PRESSURE: 84 MMHG | BODY MASS INDEX: 23.74 KG/M2 | HEIGHT: 63 IN

## 2022-08-02 DIAGNOSIS — G89.4 CHRONIC PAIN SYNDROME: Primary | ICD-10-CM

## 2022-08-02 PROCEDURE — 1100F PTFALLS ASSESS-DOCD GE2>/YR: CPT | Mod: CPTII,S$GLB,, | Performed by: NURSE PRACTITIONER

## 2022-08-02 PROCEDURE — 99999 PR PBB SHADOW E&M-EST. PATIENT-LVL V: ICD-10-PCS | Mod: PBBFAC,,, | Performed by: NURSE PRACTITIONER

## 2022-08-02 PROCEDURE — 3075F SYST BP GE 130 - 139MM HG: CPT | Mod: CPTII,S$GLB,, | Performed by: NURSE PRACTITIONER

## 2022-08-02 PROCEDURE — 1160F PR REVIEW ALL MEDS BY PRESCRIBER/CLIN PHARMACIST DOCUMENTED: ICD-10-PCS | Mod: CPTII,S$GLB,, | Performed by: NURSE PRACTITIONER

## 2022-08-02 PROCEDURE — 3079F DIAST BP 80-89 MM HG: CPT | Mod: CPTII,S$GLB,, | Performed by: NURSE PRACTITIONER

## 2022-08-02 PROCEDURE — 99213 PR OFFICE/OUTPT VISIT, EST, LEVL III, 20-29 MIN: ICD-10-PCS | Mod: S$GLB,,, | Performed by: NURSE PRACTITIONER

## 2022-08-02 PROCEDURE — 1159F MED LIST DOCD IN RCRD: CPT | Mod: CPTII,S$GLB,, | Performed by: NURSE PRACTITIONER

## 2022-08-02 PROCEDURE — 3288F FALL RISK ASSESSMENT DOCD: CPT | Mod: CPTII,S$GLB,, | Performed by: NURSE PRACTITIONER

## 2022-08-02 PROCEDURE — 3079F PR MOST RECENT DIASTOLIC BLOOD PRESSURE 80-89 MM HG: ICD-10-PCS | Mod: CPTII,S$GLB,, | Performed by: NURSE PRACTITIONER

## 2022-08-02 PROCEDURE — 1160F RVW MEDS BY RX/DR IN RCRD: CPT | Mod: CPTII,S$GLB,, | Performed by: NURSE PRACTITIONER

## 2022-08-02 PROCEDURE — 1100F PR PT FALLS ASSESS DOC 2+ FALLS/FALL W/INJURY/YR: ICD-10-PCS | Mod: CPTII,S$GLB,, | Performed by: NURSE PRACTITIONER

## 2022-08-02 PROCEDURE — 3288F PR FALLS RISK ASSESSMENT DOCUMENTED: ICD-10-PCS | Mod: CPTII,S$GLB,, | Performed by: NURSE PRACTITIONER

## 2022-08-02 PROCEDURE — 1159F PR MEDICATION LIST DOCUMENTED IN MEDICAL RECORD: ICD-10-PCS | Mod: CPTII,S$GLB,, | Performed by: NURSE PRACTITIONER

## 2022-08-02 PROCEDURE — 3075F PR MOST RECENT SYSTOLIC BLOOD PRESS GE 130-139MM HG: ICD-10-PCS | Mod: CPTII,S$GLB,, | Performed by: NURSE PRACTITIONER

## 2022-08-02 PROCEDURE — 99213 OFFICE O/P EST LOW 20 MIN: CPT | Mod: S$GLB,,, | Performed by: NURSE PRACTITIONER

## 2022-08-02 PROCEDURE — 99999 PR PBB SHADOW E&M-EST. PATIENT-LVL V: CPT | Mod: PBBFAC,,, | Performed by: NURSE PRACTITIONER

## 2022-08-02 RX ORDER — CALCIUM CARBONATE 500(1250)
TABLET ORAL DAILY
COMMUNITY

## 2022-08-02 RX ORDER — VITAMIN E 268 MG
400 CAPSULE ORAL DAILY
COMMUNITY

## 2022-08-02 RX ORDER — IBUPROFEN 100 MG/5ML
1000 SUSPENSION, ORAL (FINAL DOSE FORM) ORAL DAILY
COMMUNITY

## 2022-08-02 RX ORDER — TRAZODONE HYDROCHLORIDE 50 MG/1
50 TABLET ORAL NIGHTLY
COMMUNITY
Start: 2022-03-13

## 2022-08-02 RX ORDER — FLUTICASONE PROPIONATE 50 MCG
SPRAY, SUSPENSION (ML) NASAL
COMMUNITY
Start: 2022-07-01 | End: 2023-02-07

## 2022-08-02 RX ORDER — ESCITALOPRAM OXALATE 20 MG/1
20 TABLET ORAL NIGHTLY
COMMUNITY
Start: 2022-03-13

## 2022-08-02 RX ORDER — MULTIVITAMIN
1 TABLET ORAL DAILY
COMMUNITY

## 2022-08-02 RX ORDER — DEXTROMETHORPHAN HYDROBROMIDE, GUAIFENESIN 5; 100 MG/5ML; MG/5ML
1300 LIQUID ORAL EVERY 8 HOURS PRN
COMMUNITY
End: 2023-02-07

## 2022-08-02 RX ORDER — ASPIRIN 81 MG/1
81 TABLET ORAL DAILY
COMMUNITY

## 2022-08-02 RX ORDER — MIRABEGRON 25 MG/1
25 TABLET, FILM COATED, EXTENDED RELEASE ORAL DAILY
COMMUNITY
Start: 2022-02-05

## 2022-08-02 RX ORDER — GLUCOSAM/CHONDRO/HERB 149/HYAL 750-100 MG
1 TABLET ORAL DAILY
COMMUNITY

## 2022-08-02 RX ORDER — CARBOXYMETHYLCELLULOSE SODIUM 5 MG/ML
1 SOLUTION/ DROPS OPHTHALMIC DAILY PRN
COMMUNITY

## 2022-08-02 RX ORDER — GUAIFENESIN AND PHENYLEPHRINE HCL 400; 10 MG/1; MG/1
1 TABLET ORAL DAILY
COMMUNITY

## 2022-08-02 RX ORDER — HYDROCHLOROTHIAZIDE 12.5 MG/1
12.5 TABLET ORAL
COMMUNITY
End: 2023-02-07

## 2022-08-02 RX ORDER — IRBESARTAN 75 MG/1
75 TABLET ORAL
COMMUNITY
End: 2023-02-07

## 2022-08-02 RX ORDER — GABAPENTIN 600 MG/1
600 TABLET ORAL 3 TIMES DAILY
COMMUNITY
Start: 2022-07-24 | End: 2023-03-24 | Stop reason: SDUPTHER

## 2022-08-02 RX ORDER — ZONISAMIDE 100 MG/1
200 CAPSULE ORAL NIGHTLY
COMMUNITY
Start: 2022-02-14 | End: 2022-08-31 | Stop reason: SDUPTHER

## 2022-08-02 RX ORDER — HYOSCYAMINE SULFATE 0.125 MG
125 TABLET ORAL 2 TIMES DAILY
COMMUNITY
Start: 2022-06-13

## 2022-08-02 RX ORDER — FAMOTIDINE 40 MG/1
40 TABLET, FILM COATED ORAL NIGHTLY
COMMUNITY
Start: 2022-08-01

## 2022-08-02 RX ORDER — PANTOPRAZOLE SODIUM 40 MG/1
40 TABLET, DELAYED RELEASE ORAL 2 TIMES DAILY
COMMUNITY
Start: 2022-06-21

## 2022-08-02 RX ORDER — ALPRAZOLAM 0.5 MG/1
0.5 TABLET ORAL
COMMUNITY
Start: 2022-04-17 | End: 2023-02-07

## 2022-08-02 RX ORDER — FUROSEMIDE 20 MG/1
20 TABLET ORAL DAILY
COMMUNITY
Start: 2022-06-12

## 2022-08-02 RX ORDER — DICLOFENAC SODIUM 10 MG/G
GEL TOPICAL DAILY PRN
COMMUNITY

## 2022-08-02 RX ORDER — VALACYCLOVIR HYDROCHLORIDE 500 MG/1
500 TABLET, FILM COATED ORAL DAILY
COMMUNITY
Start: 2022-05-02

## 2022-08-02 RX ORDER — HYDROCODONE BITARTRATE AND ACETAMINOPHEN 10; 325 MG/1; MG/1
1 TABLET ORAL EVERY 6 HOURS PRN
Qty: 120 TABLET | Refills: 0 | Status: SHIPPED | OUTPATIENT
Start: 2022-08-02 | End: 2022-09-01

## 2022-08-02 RX ORDER — CETIRIZINE HYDROCHLORIDE 10 MG/1
10 TABLET ORAL DAILY
COMMUNITY
Start: 2022-07-01

## 2022-08-02 RX ORDER — MIDODRINE HYDROCHLORIDE 2.5 MG/1
2.5 TABLET ORAL 2 TIMES DAILY
COMMUNITY
Start: 2022-05-06 | End: 2024-01-11 | Stop reason: ALTCHOICE

## 2022-08-02 RX ORDER — B-COMPLEX WITH VITAMIN C
1 TABLET ORAL DAILY
COMMUNITY

## 2022-08-02 NOTE — PROGRESS NOTES
Subjective:       Patient ID: lEizabeth Bruno is a 77 y.o. female.    Chief Complaint:  Back Pain (F/u for medication refill. Patient reports primary pain located in lumbar region (at tailbone). Patients pain level today is a 5 and describes pain as burning and aching pain.)      History of Present Illness  Patient presents for follow up of chronic pain syndrome. Reports pain is located to her tailbone. Rates pain a 5/10 but has improved some with previous RFA. Describes this pain as a burning pain.Reports Norco helpful for pain. Has had RFA L5-S1 (last on 3/29/22) and SCS which were helpful for lumbar and shoulder pain but have not been as successful at covering tailbone pain. Reports some on and off shoulder pain. Has not had programming with [facility] lately. CT pelvis performed at [facility]; results are not in chart. Patient reports that she has an increase in burning pain at night to her right foot. States that she has tried lidocaine spray which was not helpful.        Review of Systems  Review of Systems   Musculoskeletal: Positive for back pain.   All other systems reviewed and are negative.      Objective:      Neurologic Exam     Mental Status   Oriented to person, place, and time.       Physical Exam  Vitals and nursing note reviewed.   Constitutional:       Appearance: Normal appearance.   HENT:      Head: Normocephalic and atraumatic.   Pulmonary:      Effort: Pulmonary effort is normal.   Musculoskeletal:      Lumbar back: Tenderness present.   Neurological:      Mental Status: She is alert and oriented to person, place, and time.   Psychiatric:         Mood and Affect: Mood normal.         Behavior: Behavior normal.         Thought Content: Thought content normal.         Judgment: Judgment normal.           Assessment:        1. Chronic pain syndrome        Plan:   Advised patient that after August 19th, Dr. Calero will no longer be refilling narcotic medication. Patient to follow up with PCP in  regards to obtaining new pain medication management doctor.  Advised Dr. Calero will still be able to perform interventional pain procedures.  Propose Norco 10 disp 120 to Dr. Calero; last filled 4/13/2022  Will request CT results from [facility]; not in chart. Imaging ordered by [facility] and CT performed in May 2022.  Advised to call [facility] for programming to help with pain reduction      MDM low

## 2022-08-31 RX ORDER — ZONISAMIDE 100 MG/1
200 CAPSULE ORAL NIGHTLY
Qty: 180 CAPSULE | Refills: 1 | Status: SHIPPED | OUTPATIENT
Start: 2022-08-31 | End: 2022-11-18 | Stop reason: SDUPTHER

## 2022-11-18 RX ORDER — ZONISAMIDE 100 MG/1
200 CAPSULE ORAL NIGHTLY
Qty: 180 CAPSULE | Refills: 1 | Status: SHIPPED | OUTPATIENT
Start: 2022-11-18

## 2022-11-18 NOTE — TELEPHONE ENCOUNTER
What Type Of Note Output Would You Prefer (Optional)?: Standard Output ni   How Severe Is Your Rash?: moderate Is This A New Presentation, Or A Follow-Up?: Rash

## 2022-12-27 DIAGNOSIS — G89.4 CHRONIC PAIN SYNDROME: Primary | ICD-10-CM

## 2023-01-10 ENCOUNTER — TELEPHONE (OUTPATIENT)
Dept: NEUROLOGY | Facility: CLINIC | Age: 78
End: 2023-01-10
Payer: MEDICARE

## 2023-01-10 NOTE — TELEPHONE ENCOUNTER
S/w Kathrine @ Dr. Vicky Perez's office.  Pt's referral was accepted and she was contacted to schedule.  Pt was advised that  will see her, but will not prescribe her pain medication.   Pt decided to hold off on scheduling an appointment at this time.

## 2023-01-31 ENCOUNTER — OFFICE VISIT (OUTPATIENT)
Dept: PAIN MEDICINE | Facility: CLINIC | Age: 78
End: 2023-01-31
Payer: MEDICARE

## 2023-01-31 VITALS
BODY MASS INDEX: 23.74 KG/M2 | HEART RATE: 83 BPM | HEIGHT: 63 IN | SYSTOLIC BLOOD PRESSURE: 171 MMHG | DIASTOLIC BLOOD PRESSURE: 100 MMHG | WEIGHT: 134 LBS

## 2023-01-31 DIAGNOSIS — M54.16 LUMBAR RADICULOPATHY, CHRONIC: ICD-10-CM

## 2023-01-31 DIAGNOSIS — G89.4 CHRONIC PAIN SYNDROME: Primary | ICD-10-CM

## 2023-01-31 DIAGNOSIS — M54.81 BILATERAL OCCIPITAL NEURALGIA: ICD-10-CM

## 2023-01-31 DIAGNOSIS — M54.2 CERVICALGIA: ICD-10-CM

## 2023-01-31 PROCEDURE — 1160F PR REVIEW ALL MEDS BY PRESCRIBER/CLIN PHARMACIST DOCUMENTED: ICD-10-PCS | Mod: CPTII,,, | Performed by: ANESTHESIOLOGY

## 2023-01-31 PROCEDURE — 1101F PR PT FALLS ASSESS DOC 0-1 FALLS W/OUT INJ PAST YR: ICD-10-PCS | Mod: CPTII,,, | Performed by: ANESTHESIOLOGY

## 2023-01-31 PROCEDURE — 3080F DIAST BP >= 90 MM HG: CPT | Mod: CPTII,,, | Performed by: ANESTHESIOLOGY

## 2023-01-31 PROCEDURE — 3288F FALL RISK ASSESSMENT DOCD: CPT | Mod: CPTII,,, | Performed by: ANESTHESIOLOGY

## 2023-01-31 PROCEDURE — 1159F PR MEDICATION LIST DOCUMENTED IN MEDICAL RECORD: ICD-10-PCS | Mod: CPTII,,, | Performed by: ANESTHESIOLOGY

## 2023-01-31 PROCEDURE — 3077F SYST BP >= 140 MM HG: CPT | Mod: CPTII,,, | Performed by: ANESTHESIOLOGY

## 2023-01-31 PROCEDURE — 3288F PR FALLS RISK ASSESSMENT DOCUMENTED: ICD-10-PCS | Mod: CPTII,,, | Performed by: ANESTHESIOLOGY

## 2023-01-31 PROCEDURE — 1160F RVW MEDS BY RX/DR IN RCRD: CPT | Mod: CPTII,,, | Performed by: ANESTHESIOLOGY

## 2023-01-31 PROCEDURE — 99205 PR OFFICE/OUTPT VISIT, NEW, LEVL V, 60-74 MIN: ICD-10-PCS | Mod: 25,,, | Performed by: ANESTHESIOLOGY

## 2023-01-31 PROCEDURE — 3077F PR MOST RECENT SYSTOLIC BLOOD PRESSURE >= 140 MM HG: ICD-10-PCS | Mod: CPTII,,, | Performed by: ANESTHESIOLOGY

## 2023-01-31 PROCEDURE — 99205 OFFICE O/P NEW HI 60 MIN: CPT | Mod: 25,,, | Performed by: ANESTHESIOLOGY

## 2023-01-31 PROCEDURE — 64405 PR NERVE BLOCK INJ, ANES/STEROID, OCCIPITAL: ICD-10-PCS | Mod: 50,,, | Performed by: ANESTHESIOLOGY

## 2023-01-31 PROCEDURE — 1159F MED LIST DOCD IN RCRD: CPT | Mod: CPTII,,, | Performed by: ANESTHESIOLOGY

## 2023-01-31 PROCEDURE — 1101F PT FALLS ASSESS-DOCD LE1/YR: CPT | Mod: CPTII,,, | Performed by: ANESTHESIOLOGY

## 2023-01-31 PROCEDURE — 3080F PR MOST RECENT DIASTOLIC BLOOD PRESSURE >= 90 MM HG: ICD-10-PCS | Mod: CPTII,,, | Performed by: ANESTHESIOLOGY

## 2023-01-31 PROCEDURE — 64405 NJX AA&/STRD GR OCPL NRV: CPT | Mod: 50,,, | Performed by: ANESTHESIOLOGY

## 2023-01-31 RX ORDER — LIDOCAINE HYDROCHLORIDE 20 MG/ML
2 INJECTION, SOLUTION EPIDURAL; INFILTRATION; INTRACAUDAL; PERINEURAL
Status: COMPLETED | OUTPATIENT
Start: 2023-01-31 | End: 2023-01-31

## 2023-01-31 RX ORDER — BETAMETHASONE SODIUM PHOSPHATE AND BETAMETHASONE ACETATE 3; 3 MG/ML; MG/ML
6 INJECTION, SUSPENSION INTRA-ARTICULAR; INTRALESIONAL; INTRAMUSCULAR; SOFT TISSUE
Status: COMPLETED | OUTPATIENT
Start: 2023-01-31 | End: 2023-01-31

## 2023-01-31 RX ADMIN — BETAMETHASONE SODIUM PHOSPHATE AND BETAMETHASONE ACETATE 6 MG: 3; 3 INJECTION, SUSPENSION INTRA-ARTICULAR; INTRALESIONAL; INTRAMUSCULAR; SOFT TISSUE at 02:01

## 2023-01-31 RX ADMIN — LIDOCAINE HYDROCHLORIDE 40 MG: 20 INJECTION, SOLUTION EPIDURAL; INFILTRATION; INTRACAUDAL; PERINEURAL at 02:01

## 2023-01-31 NOTE — PROGRESS NOTES
"     Vicky Perez MD        PATIENT NAME: Elizabeth Bruno  : 1945  DATE: 23  MRN: [id number]      Billing Provider: Vicky Perez MD  Level of Service:   Patient PCP Information       Provider PCP Type    Nette Marroquin MD General            Reason for Visit / Chief Complaint: Pain (Referred by dr. Calero - pt states that she occipital nerve damage, she is requesting occipital nerve blocks. She was scheduled to get them with dr. Calero. Pt rubs her head with icy hot with little relief - pt has a spinal stimulator, but it doesn't help her tailbone area, she was getting injections in that area as well - td)       Update PCP  Update Chief Complaint         History of Present Illness / Problem Focused Workflow     Elizabeth Bruno presents to the clinic with Pain (Referred by dr. Calero - pt states that she occipital nerve damage, she is requesting occipital nerve blocks. She was scheduled to get them with dr. Calero. Pt rubs her head with icy hot with little relief - pt has a spinal stimulator, but it doesn't help her tailbone area, she was getting injections in that area as well - td)     78 yo female here for initial consultation, referred by Dr. Calero.  She states that she had a fall at home in  and suffered a concussion. She was subsequently diagnosed with "occipital nerve damage."  She has undergone occipital nerve blocks a couple times per year since then, but it has been over a year since her last injections due to difficulty getting back in with Dr. Calero's office.  She currently rates her pain as 7/10 on the NRS. It gets down to 4/10 at best. Her pain level just depends on what she is doing.  She also reports a history of low back pain and has undergone GUANAKO and medial branch blocks/RF ablations in the past.  She is interested in having injections for her low back again.  Of note, she does have a Nevro SCS but states she does not have coverage in the area of her " tailbone.      Pain  Associated symptoms include headaches, numbness and weakness.   Review of Systems     Review of Systems   Musculoskeletal:  Positive for back pain, gait problem and leg pain.   Neurological:  Positive for weakness, numbness and headaches.   All other systems reviewed and are negative.     Medical / Social / Family History   History reviewed. No pertinent past medical history.    History reviewed. No pertinent surgical history.    Social History  Ms. Bruno  reports that she has never smoked. She has never used smokeless tobacco. She reports that she does not currently use alcohol. She reports that she does not use drugs.    Family History  Ms.'s Bruno family history is not on file.    Medications and Allergies     Medications  No outpatient medications have been marked as taking for the 1/31/23 encounter (Office Visit) with Vicky Perez MD.       Allergies  Review of patient's allergies indicates:   Allergen Reactions    Tizanidine Shortness Of Breath     Stops breathing    Pollen extracts Other (See Comments)       Physical Examination     Vitals:    01/31/23 1112   BP: (!) 171/100   Pulse: 83     Spine Musculoskeletal Exam    Gait    Gait is normal.    General      Constitutional: appears stated age, well-developed and well-nourished    Scleral icterus: no    Labored breathing: no    Psychiatric: normal mood and affect and no acute distress    Neurological: alert and oriented x3    Skin: intact    Lymphadenopathy: none     Assessment and Plan (including Health Maintenance)      Problem List  Smart Sets  Document Outside HM   :    Plan:   Chronic pain syndrome    Cervicalgia    Bilateral occipital neuralgia    Lumbar radiculopathy, chronic  -     CT Lumbar Spine Without Contrast; Future; Expected date: 01/31/2023       PROCEDURE:   BILATERAL OCCIPITAL NERVE BLOCKS  The patient was placed in a seated position. The site of pain and procedure were confirmed with the patient prior to  "starting the procedure. The patient's nuchal ridge of the occipital bone was identified and prepped with povidone-iodine. A 27 gauge  1" needle was advanced through the skin and subcutaneous tissues lateral to the occipital protuberance in the area of the left Greater Occipital Nerve.  Negative aspiration was confrimed.  A total of 1 ml of 2% lidocaine and 3 mg of betamethasone were injected.  There were no signs or symptoms of intravascular injection. The same procedure was repeated in identical fashion on the right side.  No complications were evident. No specimens collected.      Problem List Items Addressed This Visit    None  Visit Diagnoses       Chronic pain syndrome    -  Primary    Cervicalgia        Bilateral occipital neuralgia        Lumbar radiculopathy, chronic        Relevant Orders    CT Lumbar Spine Without Contrast              Future Appointments   Date Time Provider Department Center   2/7/2023  2:45 PM [facility]        There are no Patient Instructions on file for this visit.  No follow-ups on file.     Signature:  Vicky Perez MD      Date of encounter: 1/31/23  "

## 2023-02-07 ENCOUNTER — OFFICE VISIT (OUTPATIENT)
Dept: PAIN MEDICINE | Facility: CLINIC | Age: 78
End: 2023-02-07
Payer: MEDICARE

## 2023-02-07 VITALS
WEIGHT: 134 LBS | HEIGHT: 63 IN | DIASTOLIC BLOOD PRESSURE: 97 MMHG | HEART RATE: 77 BPM | BODY MASS INDEX: 23.74 KG/M2 | SYSTOLIC BLOOD PRESSURE: 156 MMHG

## 2023-02-07 DIAGNOSIS — G89.29 CHRONIC BACK PAIN GREATER THAN 3 MONTHS DURATION: Primary | ICD-10-CM

## 2023-02-07 DIAGNOSIS — M54.9 CHRONIC BACK PAIN GREATER THAN 3 MONTHS DURATION: Primary | ICD-10-CM

## 2023-02-07 DIAGNOSIS — M47.816 LUMBAR SPONDYLOSIS: ICD-10-CM

## 2023-02-07 DIAGNOSIS — M51.36 LUMBAR DEGENERATIVE DISC DISEASE: ICD-10-CM

## 2023-02-07 PROBLEM — M51.369 LUMBAR DEGENERATIVE DISC DISEASE: Status: ACTIVE | Noted: 2023-02-07

## 2023-02-07 PROCEDURE — 3077F SYST BP >= 140 MM HG: CPT | Mod: CPTII,,, | Performed by: ANESTHESIOLOGY

## 2023-02-07 PROCEDURE — 3288F FALL RISK ASSESSMENT DOCD: CPT | Mod: CPTII,,, | Performed by: ANESTHESIOLOGY

## 2023-02-07 PROCEDURE — 3288F PR FALLS RISK ASSESSMENT DOCUMENTED: ICD-10-PCS | Mod: CPTII,,, | Performed by: ANESTHESIOLOGY

## 2023-02-07 PROCEDURE — 99214 OFFICE O/P EST MOD 30 MIN: CPT | Mod: ,,, | Performed by: ANESTHESIOLOGY

## 2023-02-07 PROCEDURE — 3077F PR MOST RECENT SYSTOLIC BLOOD PRESSURE >= 140 MM HG: ICD-10-PCS | Mod: CPTII,,, | Performed by: ANESTHESIOLOGY

## 2023-02-07 PROCEDURE — 3080F DIAST BP >= 90 MM HG: CPT | Mod: CPTII,,, | Performed by: ANESTHESIOLOGY

## 2023-02-07 PROCEDURE — 1101F PT FALLS ASSESS-DOCD LE1/YR: CPT | Mod: CPTII,,, | Performed by: ANESTHESIOLOGY

## 2023-02-07 PROCEDURE — 99214 PR OFFICE/OUTPT VISIT, EST, LEVL IV, 30-39 MIN: ICD-10-PCS | Mod: ,,, | Performed by: ANESTHESIOLOGY

## 2023-02-07 PROCEDURE — 1159F MED LIST DOCD IN RCRD: CPT | Mod: CPTII,,, | Performed by: ANESTHESIOLOGY

## 2023-02-07 PROCEDURE — 3080F PR MOST RECENT DIASTOLIC BLOOD PRESSURE >= 90 MM HG: ICD-10-PCS | Mod: CPTII,,, | Performed by: ANESTHESIOLOGY

## 2023-02-07 PROCEDURE — 1159F PR MEDICATION LIST DOCUMENTED IN MEDICAL RECORD: ICD-10-PCS | Mod: CPTII,,, | Performed by: ANESTHESIOLOGY

## 2023-02-07 PROCEDURE — 1125F PR PAIN SEVERITY QUANTIFIED, PAIN PRESENT: ICD-10-PCS | Mod: CPTII,,, | Performed by: ANESTHESIOLOGY

## 2023-02-07 PROCEDURE — 1160F RVW MEDS BY RX/DR IN RCRD: CPT | Mod: CPTII,,, | Performed by: ANESTHESIOLOGY

## 2023-02-07 PROCEDURE — 1125F AMNT PAIN NOTED PAIN PRSNT: CPT | Mod: CPTII,,, | Performed by: ANESTHESIOLOGY

## 2023-02-07 PROCEDURE — 1160F PR REVIEW ALL MEDS BY PRESCRIBER/CLIN PHARMACIST DOCUMENTED: ICD-10-PCS | Mod: CPTII,,, | Performed by: ANESTHESIOLOGY

## 2023-02-07 PROCEDURE — 1101F PR PT FALLS ASSESS DOC 0-1 FALLS W/OUT INJ PAST YR: ICD-10-PCS | Mod: CPTII,,, | Performed by: ANESTHESIOLOGY

## 2023-02-07 NOTE — PROGRESS NOTES
"     Vicky Perez MD        PATIENT NAME: Elizabeth Bruno  : 1945  DATE: 23  MRN: [id number]      Billing Provider: Vicky Perez MD  Level of Service:   Patient PCP Information       Provider PCP Type    Nette Marroquin MD General            Reason for Visit / Chief Complaint: Results (Follow up visit to discuss CT scan results. Patient has new pain in lower back. Pain is a 6/10 on worse day. Taking prescription medication for pain. Would like to discuss injections./)       Update PCP  Update Chief Complaint         History of Present Illness / Problem Focused Workflow     Elizabeth Bruno presents to the clinic with Results (Follow up visit to discuss CT scan results. Patient has new pain in lower back. Pain is a 6/10 on worse day. Taking prescription medication for pain. Would like to discuss injections./)     78 yo female here for follow up to discuss results of CT L-spine.  She states that she had a fall at home in  and suffered a concussion. She was subsequently diagnosed with "occipital nerve damage."  She has undergone occipital nerve blocks a couple times per year since then, but it has been over a year since her last injections due to difficulty getting back in with Dr. Calero's office.  She currently rates her pain as 7/10 on the NRS. It gets down to 4/10 at best. Her pain level just depends on what she is doing.  She also reports a history of low back pain and has undergone GUANAKO and medial branch blocks/RF ablations in the past.  She is interested in having injections for her low back again.  Of note, she does have a Nevro SCS but states she does not have coverage in the area of her tailbone.      Pain  Associated symptoms include headaches, numbness and weakness.   Review of Systems     Review of Systems   Musculoskeletal:  Positive for back pain, gait problem and leg pain.   Neurological:  Positive for weakness, numbness and headaches.   All other systems reviewed and are " negative.     Medical / Social / Family History     Past Medical History:   Diagnosis Date    Syringomyelia        Past Surgical History:   Procedure Laterality Date    CARPAL TUNNEL RELEASE       SECTION      CHOLECYSTECTOMY      HERNIA REPAIR      HYSTERECTOMY      neck fusion      SPINAL CORD STIMULATOR IMPLANT      TONSILLECTOMY      TOTAL KNEE REPLACEMENT USING COMPUTER NAVIGATION Bilateral        Social History  Ms. Bruno  reports that she has never smoked. She has never used smokeless tobacco. She reports that she does not currently use alcohol. She reports that she does not use drugs.    Family History  Ms.'s Damion family history is not on file.    Medications and Allergies     Medications  Outpatient Medications Marked as Taking for the 23 encounter (Office Visit) with Vicky Perez MD   Medication Sig Dispense Refill    ascorbic acid, vitamin C, (VITAMIN C) 1000 MG tablet Take 1,000 mg by mouth.      aspirin (ECOTRIN) 81 MG EC tablet Take 81 mg by mouth.      B-complex with vitamin C (Z-BEC OR EQUIV) tablet Take 1 tablet by mouth once daily.      calcium carbonate (OS-CARI) 500 mg calcium (1,250 mg) tablet Take by mouth.      calcium-vitamin D 600 mg-10 mcg (400 unit) Tab Take 1 tablet by mouth once daily.      carboxymethylcellulose (REFRESH PLUS) 0.5 % Dpet Apply 1 drop to eye daily as needed.      cetirizine (ZYRTEC) 10 MG tablet Take 10 mg by mouth once daily.      diclofenac sodium (VOLTAREN) 1 % Gel Apply topically daily as needed.      EScitalopram oxalate (LEXAPRO) 20 MG tablet Take 20 mg by mouth nightly.      famotidine (PEPCID) 40 MG tablet Take 40 mg by mouth nightly.      furosemide (LASIX) 20 MG tablet Take 20 mg by mouth once daily.      gabapentin (NEURONTIN) 600 MG tablet Take 600 mg by mouth 3 (three) times daily.      glucosamine/chondr rivera A sod (OSTEO BI-FLEX ORAL) Take by mouth.      hyoscyamine (ANASPAZ,LEVSIN) 0.125 mg Tab Take 125 mcg by mouth 2 (two) times  daily.      midodrine (PROAMATINE) 2.5 MG Tab Take 2.5 mg by mouth 2 (two) times daily.      multivitamin with folic acid 400 mcg Tab Take 1 tablet by mouth once daily.      MYRBETRIQ 25 mg Tb24 ER tablet Take 25 mg by mouth once daily.      omega 3-dha-epa-fish oil 1,000 mg (120 mg-180 mg) Cap Take 1 capsule by mouth once daily.      pantoprazole (PROTONIX) 40 MG tablet Take 40 mg by mouth 2 (two) times daily.      traZODone (DESYREL) 50 MG tablet Take 50 mg by mouth nightly.      turmeric root extract 500 mg Cap Take 1 capsule by mouth once daily.      valACYclovir (VALTREX) 500 MG tablet Take 500 mg by mouth once daily.      vitamin E 400 UNIT capsule Take 400 Units by mouth.      zonisamide (ZONEGRAN) 100 MG Cap Take 2 capsules (200 mg total) by mouth nightly. 180 capsule 1       Allergies  Review of patient's allergies indicates:   Allergen Reactions    Tizanidine Shortness Of Breath     Stops breathing    Pollen extracts Other (See Comments)       Physical Examination     Vitals:    02/07/23 1446   BP: (!) 156/97   Pulse: 77       Spine Musculoskeletal Exam    Gait    Gait is normal.    General      Constitutional: appears stated age, well-developed and well-nourished    Scleral icterus: no    Labored breathing: no    Psychiatric: normal mood and affect and no acute distress    Neurological: alert and oriented x3    Skin: intact    Lymphadenopathy: none     Assessment and Plan (including Health Maintenance)      Problem List  Smart Sets  Document Outside HM   :    Plan:   Chronic back pain greater than 3 months duration    Lumbar spondylosis    Lumbar degenerative disc disease      Scheduling the patient today for bilateral L3-5 diagnostic medial branch blocks for her axial low back pain with findings of rather advanced facet arthropathy on her imaging.  The plan was discussed with the patient and she wishes to proceed.    Problem List Items Addressed This Visit       Chronic back pain greater than 3 months  duration - Primary    Lumbar spondylosis    Lumbar degenerative disc disease           Future Appointments   Date Time Provider Department Center   3/24/2023 10:15 AM Vicky Perez MD [facility]          There are no Patient Instructions on file for this visit.  No follow-ups on file.     Signature:  Vicky Perez MD      Date of encounter: 2/7/23

## 2023-02-07 NOTE — H&P (VIEW-ONLY)
"     Vicky Perez MD        PATIENT NAME: Elizabeth Bruno  : 1945  DATE: 23  MRN: [id number]      Billing Provider: Vicky Perez MD  Level of Service:   Patient PCP Information       Provider PCP Type    Nette Marroquin MD General            Reason for Visit / Chief Complaint: Results (Follow up visit to discuss CT scan results. Patient has new pain in lower back. Pain is a 6/10 on worse day. Taking prescription medication for pain. Would like to discuss injections./)       Update PCP  Update Chief Complaint         History of Present Illness / Problem Focused Workflow     Elizabeth Bruno presents to the clinic with Results (Follow up visit to discuss CT scan results. Patient has new pain in lower back. Pain is a 6/10 on worse day. Taking prescription medication for pain. Would like to discuss injections./)     76 yo female here for follow up to discuss results of CT L-spine.  She states that she had a fall at home in  and suffered a concussion. She was subsequently diagnosed with "occipital nerve damage."  She has undergone occipital nerve blocks a couple times per year since then, but it has been over a year since her last injections due to difficulty getting back in with Dr. Calero's office.  She currently rates her pain as 7/10 on the NRS. It gets down to 4/10 at best. Her pain level just depends on what she is doing.  She also reports a history of low back pain and has undergone GUANAKO and medial branch blocks/RF ablations in the past.  She is interested in having injections for her low back again.  Of note, she does have a Nevro SCS but states she does not have coverage in the area of her tailbone.      Pain  Associated symptoms include headaches, numbness and weakness.   Review of Systems     Review of Systems   Musculoskeletal:  Positive for back pain, gait problem and leg pain.   Neurological:  Positive for weakness, numbness and headaches.   All other systems reviewed and are " negative.     Medical / Social / Family History     Past Medical History:   Diagnosis Date    Syringomyelia        Past Surgical History:   Procedure Laterality Date    CARPAL TUNNEL RELEASE       SECTION      CHOLECYSTECTOMY      HERNIA REPAIR      HYSTERECTOMY      neck fusion      SPINAL CORD STIMULATOR IMPLANT      TONSILLECTOMY      TOTAL KNEE REPLACEMENT USING COMPUTER NAVIGATION Bilateral        Social History  Ms. Bruno  reports that she has never smoked. She has never used smokeless tobacco. She reports that she does not currently use alcohol. She reports that she does not use drugs.    Family History  Ms.'s Damion family history is not on file.    Medications and Allergies     Medications  Outpatient Medications Marked as Taking for the 23 encounter (Office Visit) with Vicky Perez MD   Medication Sig Dispense Refill    ascorbic acid, vitamin C, (VITAMIN C) 1000 MG tablet Take 1,000 mg by mouth.      aspirin (ECOTRIN) 81 MG EC tablet Take 81 mg by mouth.      B-complex with vitamin C (Z-BEC OR EQUIV) tablet Take 1 tablet by mouth once daily.      calcium carbonate (OS-CARI) 500 mg calcium (1,250 mg) tablet Take by mouth.      calcium-vitamin D 600 mg-10 mcg (400 unit) Tab Take 1 tablet by mouth once daily.      carboxymethylcellulose (REFRESH PLUS) 0.5 % Dpet Apply 1 drop to eye daily as needed.      cetirizine (ZYRTEC) 10 MG tablet Take 10 mg by mouth once daily.      diclofenac sodium (VOLTAREN) 1 % Gel Apply topically daily as needed.      EScitalopram oxalate (LEXAPRO) 20 MG tablet Take 20 mg by mouth nightly.      famotidine (PEPCID) 40 MG tablet Take 40 mg by mouth nightly.      furosemide (LASIX) 20 MG tablet Take 20 mg by mouth once daily.      gabapentin (NEURONTIN) 600 MG tablet Take 600 mg by mouth 3 (three) times daily.      glucosamine/chondr rivera A sod (OSTEO BI-FLEX ORAL) Take by mouth.      hyoscyamine (ANASPAZ,LEVSIN) 0.125 mg Tab Take 125 mcg by mouth 2 (two) times  daily.      midodrine (PROAMATINE) 2.5 MG Tab Take 2.5 mg by mouth 2 (two) times daily.      multivitamin with folic acid 400 mcg Tab Take 1 tablet by mouth once daily.      MYRBETRIQ 25 mg Tb24 ER tablet Take 25 mg by mouth once daily.      omega 3-dha-epa-fish oil 1,000 mg (120 mg-180 mg) Cap Take 1 capsule by mouth once daily.      pantoprazole (PROTONIX) 40 MG tablet Take 40 mg by mouth 2 (two) times daily.      traZODone (DESYREL) 50 MG tablet Take 50 mg by mouth nightly.      turmeric root extract 500 mg Cap Take 1 capsule by mouth once daily.      valACYclovir (VALTREX) 500 MG tablet Take 500 mg by mouth once daily.      vitamin E 400 UNIT capsule Take 400 Units by mouth.      zonisamide (ZONEGRAN) 100 MG Cap Take 2 capsules (200 mg total) by mouth nightly. 180 capsule 1       Allergies  Review of patient's allergies indicates:   Allergen Reactions    Tizanidine Shortness Of Breath     Stops breathing    Pollen extracts Other (See Comments)       Physical Examination     Vitals:    02/07/23 1446   BP: (!) 156/97   Pulse: 77       Spine Musculoskeletal Exam    Gait    Gait is normal.    General      Constitutional: appears stated age, well-developed and well-nourished    Scleral icterus: no    Labored breathing: no    Psychiatric: normal mood and affect and no acute distress    Neurological: alert and oriented x3    Skin: intact    Lymphadenopathy: none     Assessment and Plan (including Health Maintenance)      Problem List  Smart Sets  Document Outside HM   :    Plan:   Chronic back pain greater than 3 months duration    Lumbar spondylosis    Lumbar degenerative disc disease      Scheduling the patient today for bilateral L3-5 diagnostic medial branch blocks for her axial low back pain with findings of rather advanced facet arthropathy on her imaging.  The plan was discussed with the patient and she wishes to proceed.    Problem List Items Addressed This Visit       Chronic back pain greater than 3 months  duration - Primary    Lumbar spondylosis    Lumbar degenerative disc disease           Future Appointments   Date Time Provider Department Center   3/24/2023 10:15 AM Vicky Perez MD [facility]          There are no Patient Instructions on file for this visit.  No follow-ups on file.     Signature:  Vicky Perez MD      Date of encounter: 2/7/23

## 2023-03-07 NOTE — DISCHARGE INSTRUCTIONS
Medial Branch Block After Care    You may take the bandage off and shower tomorrow. Check for signs and symptoms of infection daily: redness, warmth, pus or drainage, increase swelling, and foul odor.  Wash your hands before and after touching your puncture site.  No heavy lifting for 1 week.  No driving for 24 hours.  You may resume your regular diet today.  You may resume your regular activities tomorrow.  No heating pad on the puncture site for 24 hours. You may use an ice pack for pain or swelling for no longer than 15 minutes at a time for 3-4 times a day. Do not place ice directly on your skin.    Contact your doctor for:  Increase pain not controlled with medication.  Any new numbness or tingling.  Fever greater than 101 degree Fahrenheit that does not break with medication.  Any signs or symptoms of infection: redness, warmth, pus or drainage, increase swelling, and foul odor at the puncture site.

## 2023-03-09 ENCOUNTER — ANESTHESIA EVENT (OUTPATIENT)
Dept: SURGERY | Facility: HOSPITAL | Age: 78
End: 2023-03-09
Payer: MEDICARE

## 2023-03-09 ENCOUNTER — ANESTHESIA (OUTPATIENT)
Dept: SURGERY | Facility: HOSPITAL | Age: 78
End: 2023-03-09
Payer: MEDICARE

## 2023-03-09 ENCOUNTER — HOSPITAL ENCOUNTER (OUTPATIENT)
Facility: HOSPITAL | Age: 78
Discharge: HOME | End: 2023-03-09
Attending: ANESTHESIOLOGY | Admitting: ANESTHESIOLOGY
Payer: MEDICARE

## 2023-03-09 DIAGNOSIS — G89.29 CHRONIC BACK PAIN GREATER THAN 3 MONTHS DURATION: ICD-10-CM

## 2023-03-09 DIAGNOSIS — M54.9 CHRONIC BACK PAIN GREATER THAN 3 MONTHS DURATION: ICD-10-CM

## 2023-03-09 PROCEDURE — 64494 INJ PARAVERT F JNT L/S 2 LEV: CPT | Mod: 50,,, | Performed by: ANESTHESIOLOGY

## 2023-03-09 PROCEDURE — 64493 INJ PARAVERT F JNT L/S 1 LEV: CPT | Mod: 50 | Performed by: ANESTHESIOLOGY

## 2023-03-09 PROCEDURE — 64493 INJ PARAVERT F JNT L/S 1 LEV: CPT | Mod: 50,,, | Performed by: ANESTHESIOLOGY

## 2023-03-09 PROCEDURE — 37000008 HC ANESTHESIA 1ST 15 MINUTES: Performed by: ANESTHESIOLOGY

## 2023-03-09 PROCEDURE — 64494 INJ PARAVERT F JNT L/S 2 LEV: CPT | Mod: 50 | Performed by: ANESTHESIOLOGY

## 2023-03-09 PROCEDURE — 63600175 PHARM REV CODE 636 W HCPCS: Performed by: NURSE ANESTHETIST, CERTIFIED REGISTERED

## 2023-03-09 PROCEDURE — 25000003 PHARM REV CODE 250: Performed by: ANESTHESIOLOGY

## 2023-03-09 PROCEDURE — 64494 PR INJ DX/THER AGNT PARAVERT FACET JOINT,IMG GUIDE,LUMBAR/SAC, 2ND LEVEL: ICD-10-PCS | Mod: 50,,, | Performed by: ANESTHESIOLOGY

## 2023-03-09 PROCEDURE — 64493 PR INJ DX/THER AGNT PARAVERT FACET JOINT,IMG GUIDE,LUMBAR/SAC,1ST LVL: ICD-10-PCS | Mod: 50,,, | Performed by: ANESTHESIOLOGY

## 2023-03-09 RX ORDER — LIDOCAINE HYDROCHLORIDE 10 MG/ML
1 INJECTION, SOLUTION EPIDURAL; INFILTRATION; INTRACAUDAL; PERINEURAL ONCE
Status: CANCELLED | OUTPATIENT
Start: 2023-03-09 | End: 2023-03-09

## 2023-03-09 RX ORDER — SODIUM CHLORIDE, SODIUM GLUCONATE, SODIUM ACETATE, POTASSIUM CHLORIDE AND MAGNESIUM CHLORIDE 30; 37; 368; 526; 502 MG/100ML; MG/100ML; MG/100ML; MG/100ML; MG/100ML
INJECTION, SOLUTION INTRAVENOUS CONTINUOUS
Status: CANCELLED | OUTPATIENT
Start: 2023-03-09 | End: 2023-04-08

## 2023-03-09 RX ORDER — PROCHLORPERAZINE EDISYLATE 5 MG/ML
5 INJECTION INTRAMUSCULAR; INTRAVENOUS EVERY 30 MIN PRN
Status: CANCELLED | OUTPATIENT
Start: 2023-03-09

## 2023-03-09 RX ORDER — MEPERIDINE HYDROCHLORIDE 25 MG/ML
12.5 INJECTION INTRAMUSCULAR; INTRAVENOUS; SUBCUTANEOUS EVERY 10 MIN PRN
Status: CANCELLED | OUTPATIENT
Start: 2023-03-09 | End: 2023-03-10

## 2023-03-09 RX ORDER — LIDOCAINE HYDROCHLORIDE 10 MG/ML
INJECTION, SOLUTION EPIDURAL; INFILTRATION; INTRACAUDAL; PERINEURAL
Status: DISCONTINUED | OUTPATIENT
Start: 2023-03-09 | End: 2023-03-09 | Stop reason: HOSPADM

## 2023-03-09 RX ORDER — ONDANSETRON 4 MG/1
8 TABLET, ORALLY DISINTEGRATING ORAL EVERY 6 HOURS PRN
Status: CANCELLED | OUTPATIENT
Start: 2023-03-09

## 2023-03-09 RX ORDER — ONDANSETRON 2 MG/ML
4 INJECTION INTRAMUSCULAR; INTRAVENOUS DAILY PRN
Status: CANCELLED | OUTPATIENT
Start: 2023-03-09

## 2023-03-09 RX ORDER — BUPIVACAINE HYDROCHLORIDE 2.5 MG/ML
INJECTION, SOLUTION EPIDURAL; INFILTRATION; INTRACAUDAL
Status: DISCONTINUED | OUTPATIENT
Start: 2023-03-09 | End: 2023-03-09 | Stop reason: HOSPADM

## 2023-03-09 RX ORDER — LORAZEPAM 2 MG/ML
0.25 INJECTION INTRAMUSCULAR ONCE AS NEEDED
Status: CANCELLED | OUTPATIENT
Start: 2023-03-09 | End: 2034-08-04

## 2023-03-09 RX ORDER — PROPOFOL 10 MG/ML
VIAL (ML) INTRAVENOUS
Status: DISCONTINUED | OUTPATIENT
Start: 2023-03-09 | End: 2023-03-09

## 2023-03-09 RX ORDER — IPRATROPIUM BROMIDE AND ALBUTEROL SULFATE 2.5; .5 MG/3ML; MG/3ML
3 SOLUTION RESPIRATORY (INHALATION)
Status: CANCELLED | OUTPATIENT
Start: 2023-03-09

## 2023-03-09 RX ADMIN — PROPOFOL 40 MG: 10 INJECTION, EMULSION INTRAVENOUS at 09:03

## 2023-03-09 NOTE — TRANSFER OF CARE
"Anesthesia Transfer of Care Note    Patient: Elizabeth Bruno    Procedure(s) Performed: Procedure(s) (LRB):  BLOCK, NERVE, FACET JOINT, LUMBAR, MEDIAL BRANCH Bilateral L3-5 (Bilateral)    Patient location: OPS    Anesthesia Type: MAC    Transport from OR: Transported from OR on room air with adequate spontaneous ventilation    Post pain: adequate analgesia    Post assessment: no apparent anesthetic complications    Post vital signs: stable    Level of consciousness: awake    Nausea/Vomiting: no nausea/vomiting    Complications: none    Transfer of care protocol was followed      Last vitals:   Visit Vitals  /70   Pulse 60   Temp 36.3 °C (97.3 °F)   Resp 15   Ht 5' 2" (1.575 m)   Wt 60.8 kg (134 lb)   SpO2 100%   Breastfeeding No   BMI 24.51 kg/m²     "

## 2023-03-09 NOTE — ANESTHESIA POSTPROCEDURE EVALUATION
Anesthesia Post Evaluation    Patient: Elizabeth Bruno    Procedure(s) Performed: Procedure(s) (LRB):  BLOCK, NERVE, FACET JOINT, LUMBAR, MEDIAL BRANCH Bilateral L3-5 (Bilateral)    Final Anesthesia Type: MAC      Patient location during evaluation: OPS  Patient participation: Yes- Able to Participate  Level of consciousness: awake and alert  Post-procedure vital signs: reviewed and stable  Pain management: adequate  Airway patency: patent    PONV status at discharge: No PONV  Anesthetic complications: no      Cardiovascular status: blood pressure returned to baseline  Respiratory status: unassisted and room air  Hydration status: euvolemic  Follow-up not needed.          Vitals Value Taken Time   /70 03/09/23 0911   Temp 36.3 °C (97.3 °F) 03/09/23 0911   Pulse 60 03/09/23 0911   Resp 15 03/09/23 0911   SpO2 100 % 03/09/23 0911         No case tracking events are documented in the log.      Pain/Rene Score: No data recorded

## 2023-03-09 NOTE — DISCHARGE SUMMARY
[facility] Surgical - Periop Services  Discharge Note  Short Stay    Procedure(s) (LRB):  BLOCK, NERVE, FACET JOINT, LUMBAR, MEDIAL BRANCH Bilateral L3-5 (Bilateral)      OUTCOME: Patient tolerated treatment/procedure well without complication and is now ready for discharge.    DISPOSITION: Home or Self Care    FINAL DIAGNOSIS:  <principal problem not specified>    FOLLOWUP: In clinic    DISCHARGE INSTRUCTIONS:  No discharge procedures on file.     TIME SPENT ON DISCHARGE: 5 minutes

## 2023-03-09 NOTE — INTERVAL H&P NOTE
The patient has been examined and the H&P has been reviewed:    I concur with the findings and no changes have occurred since H&P was written.    Procedure risks, benefits and alternative options discussed and understood by patient/family.    Resp: non labored   CV: ext warm, well perfused      There are no hospital problems to display for this patient.

## 2023-03-09 NOTE — OP NOTE
Bilateral L3-5 diagnostic medial branch blocks    Pre-Procedure Diagnoses:  1. Chronic pain syndrome  2. Low back pain  3. Lumbar facet arthropathy    Post-Procedure Diagnoses:  1. Chronic pain syndrome  2. Low back pain  3. Lumbar facet arthropathy    Anesthesia:  Local and MAC    Estimated Blood Loss:  None    Complications:  None    Informed Consent:  The procedure, risks, benefits, and alternatives were discussed with the patient. There were no contraindications to the procedure. The patient expressed understanding and agreed to proceed. Fully informed written consent was obtained.     Description of the Procedure:  The patient was taken to the operating room. IV access was obtained prior to the start of the procedure. The patient was positioned prone on the fluoroscopy table. Continuous hemodynamic monitoring was initiated and continued throughout the duration of the procedure. The skin overlying the lumbosacral spine was prepped with Chloraprep and draped into a sterile field. Fluoroscopy was used to identify the locations of the left side L3, L4, and L5 medial branch nerves at the junctions of the superior articular processes and transverse processes of L4, L5, and the sacral ala, respectively. Skin anesthesia was achieved using 0.5 mL of 1% lidocaine over each injection site. A 22-gauge 3.5-inch Quinke spinal needle was slowly inserted and advanced under intermittent fluoroscopic guidance until it made bony contact at the target sites. Proper needle position was confirmed under AP, oblique, and lateral fluoroscopic views. Negative aspiration for blood or CSF was confirmed. Then 0.5 mL of 0.25% bupivacaine was easily injected at each level. There was no pain on injection. The needles were removed intact and bleeding was nil. The same procedure was repeated in identical fashion on the right side. Sterile bandages were applied. The patient was taken to the recovery room for further observation in stable  condition. The patient was then discharged home without any complications.

## 2023-03-09 NOTE — ANESTHESIA PREPROCEDURE EVALUATION
03/09/2023  Elizabeth Bruno is a 78 y.o., female with moderate aortic stenosis (asymptomatic) and preserved EF presents as an outpatient for bilateral medial branch block L3-5.    2D echo November 2020   EF 56%   Moderate aortic stenosis (MAICOL 1.1 centimeter sq with a gradient of 12)    Last 3 sets of Vitals    Vitals - 1 value per visit 2/7/2023 2/7/2023 3/2/2023   SYSTOLIC - 156 -   DIASTOLIC - 97 -   Pulse - 77 -   Weight (lb) - 134 134   Weight (kg) - 60.782 60.782   Height - 63 62   BMI (Calculated) - 23.7 24.5   VISIT REPORT - - -   Pain Score  6 - -       Pre-op Assessment    I have reviewed the Patient Summary Reports.    I have reviewed the NPO Status.   I have reviewed the Medications.     Review of Systems  Anesthesia Hx:   Denies Personal Hx of Anesthesia complications.   Social:  Non-Smoker    Cardiovascular:   Hypertension  Functional Capacity 3 METS  Valvular Heart Disease: Aortic Stenosis (AS), moderate      Hepatic/GI:   GERD        Physical Exam  General: Well nourished, Cooperative, Alert and Oriented    Airway:  Mallampati: II   Mouth Opening: Normal  TM Distance: Normal  Tongue: Normal  Neck ROM: Normal ROM    Dental:  Intact    Chest/Lungs:  Clear to auscultation, Normal Respiratory Rate    Heart:  Rate: Normal  Rhythm: Regular Rhythm        Anesthesia Plan  Type of Anesthesia, risks & benefits discussed:    Anesthesia Type: Gen Natural Airway  Intra-op Monitoring Plan: Standard ASA Monitors  Induction:  IV  Informed Consent: Informed consent signed with the Patient and all parties understand the risks and agree with anesthesia plan.  All questions answered.   ASA Score: 3  Day of Surgery Review of History & Physical: H&P Update referred to the surgeon/provider.    Ready For Surgery From Anesthesia Perspective.     .

## 2023-03-10 VITALS
WEIGHT: 134 LBS | TEMPERATURE: 97 F | HEIGHT: 62 IN | SYSTOLIC BLOOD PRESSURE: 130 MMHG | DIASTOLIC BLOOD PRESSURE: 82 MMHG | OXYGEN SATURATION: 98 % | BODY MASS INDEX: 24.66 KG/M2 | HEART RATE: 54 BPM | RESPIRATION RATE: 18 BRPM

## 2023-03-23 DIAGNOSIS — R13.12 OROPHARYNGEAL DYSPHAGIA: Primary | ICD-10-CM

## 2023-03-24 ENCOUNTER — OFFICE VISIT (OUTPATIENT)
Dept: PAIN MEDICINE | Facility: CLINIC | Age: 78
End: 2023-03-24
Payer: MEDICARE

## 2023-03-24 VITALS
WEIGHT: 134 LBS | BODY MASS INDEX: 24.66 KG/M2 | SYSTOLIC BLOOD PRESSURE: 163 MMHG | HEART RATE: 87 BPM | DIASTOLIC BLOOD PRESSURE: 95 MMHG | TEMPERATURE: 98 F | HEIGHT: 62 IN

## 2023-03-24 DIAGNOSIS — G89.29 CHRONIC BACK PAIN GREATER THAN 3 MONTHS DURATION: ICD-10-CM

## 2023-03-24 DIAGNOSIS — M47.816 LUMBAR SPONDYLOSIS: Primary | ICD-10-CM

## 2023-03-24 DIAGNOSIS — M54.2 CERVICALGIA: ICD-10-CM

## 2023-03-24 DIAGNOSIS — M54.9 CHRONIC BACK PAIN GREATER THAN 3 MONTHS DURATION: ICD-10-CM

## 2023-03-24 DIAGNOSIS — M51.36 LUMBAR DEGENERATIVE DISC DISEASE: ICD-10-CM

## 2023-03-24 DIAGNOSIS — M54.2 CHRONIC NECK PAIN: ICD-10-CM

## 2023-03-24 DIAGNOSIS — G89.29 CHRONIC NECK PAIN: ICD-10-CM

## 2023-03-24 PROCEDURE — 1101F PR PT FALLS ASSESS DOC 0-1 FALLS W/OUT INJ PAST YR: ICD-10-PCS | Mod: CPTII,,, | Performed by: ANESTHESIOLOGY

## 2023-03-24 PROCEDURE — 3288F FALL RISK ASSESSMENT DOCD: CPT | Mod: CPTII,,, | Performed by: ANESTHESIOLOGY

## 2023-03-24 PROCEDURE — 3077F SYST BP >= 140 MM HG: CPT | Mod: CPTII,,, | Performed by: ANESTHESIOLOGY

## 2023-03-24 PROCEDURE — 1101F PT FALLS ASSESS-DOCD LE1/YR: CPT | Mod: CPTII,,, | Performed by: ANESTHESIOLOGY

## 2023-03-24 PROCEDURE — 3077F PR MOST RECENT SYSTOLIC BLOOD PRESSURE >= 140 MM HG: ICD-10-PCS | Mod: CPTII,,, | Performed by: ANESTHESIOLOGY

## 2023-03-24 PROCEDURE — 1160F PR REVIEW ALL MEDS BY PRESCRIBER/CLIN PHARMACIST DOCUMENTED: ICD-10-PCS | Mod: CPTII,,, | Performed by: ANESTHESIOLOGY

## 2023-03-24 PROCEDURE — 3080F DIAST BP >= 90 MM HG: CPT | Mod: CPTII,,, | Performed by: ANESTHESIOLOGY

## 2023-03-24 PROCEDURE — 99214 PR OFFICE/OUTPT VISIT, EST, LEVL IV, 30-39 MIN: ICD-10-PCS | Mod: ,,, | Performed by: ANESTHESIOLOGY

## 2023-03-24 PROCEDURE — 3080F PR MOST RECENT DIASTOLIC BLOOD PRESSURE >= 90 MM HG: ICD-10-PCS | Mod: CPTII,,, | Performed by: ANESTHESIOLOGY

## 2023-03-24 PROCEDURE — 1159F PR MEDICATION LIST DOCUMENTED IN MEDICAL RECORD: ICD-10-PCS | Mod: CPTII,,, | Performed by: ANESTHESIOLOGY

## 2023-03-24 PROCEDURE — 1160F RVW MEDS BY RX/DR IN RCRD: CPT | Mod: CPTII,,, | Performed by: ANESTHESIOLOGY

## 2023-03-24 PROCEDURE — 1125F PR PAIN SEVERITY QUANTIFIED, PAIN PRESENT: ICD-10-PCS | Mod: CPTII,,, | Performed by: ANESTHESIOLOGY

## 2023-03-24 PROCEDURE — 99214 OFFICE O/P EST MOD 30 MIN: CPT | Mod: ,,, | Performed by: ANESTHESIOLOGY

## 2023-03-24 PROCEDURE — 1159F MED LIST DOCD IN RCRD: CPT | Mod: CPTII,,, | Performed by: ANESTHESIOLOGY

## 2023-03-24 PROCEDURE — 3288F PR FALLS RISK ASSESSMENT DOCUMENTED: ICD-10-PCS | Mod: CPTII,,, | Performed by: ANESTHESIOLOGY

## 2023-03-24 PROCEDURE — 1125F AMNT PAIN NOTED PAIN PRSNT: CPT | Mod: CPTII,,, | Performed by: ANESTHESIOLOGY

## 2023-03-24 RX ORDER — GABAPENTIN 600 MG/1
600 TABLET ORAL 3 TIMES DAILY
Qty: 90 TABLET | Refills: 2 | Status: SHIPPED | OUTPATIENT
Start: 2023-03-24 | End: 2024-02-08

## 2023-03-24 NOTE — PROGRESS NOTES
"     Vicky Perez MD        PATIENT NAME: Elizabeth Bruno  : 1945  DATE: 3/24/23  MRN: [id number]      Billing Provider: Vicky Perez MD  Level of Service:   Patient PCP Information       Provider PCP Type    Nette Marroquin MD General            Reason for Visit / Chief Complaint: Low-back Pain (Post-op MBB, pt states she received great relief which has lasted to date, has pain today, prescription medication and muscle rub for relief, pain level 2/10)       Update PCP  Update Chief Complaint         History of Present Illness / Problem Focused Workflow     Elizabeth Bruno presents to the clinic with Low-back Pain (Post-op MBB, pt states she received great relief which has lasted to date, has pain today, prescription medication and muscle rub for relief, pain level 2/10)     76 yo female here for follow up to discuss results of CT L-spine.  She states that she had a fall at home in  and suffered a concussion. She was subsequently diagnosed with "occipital nerve damage."  She has undergone occipital nerve blocks a couple times per year since then.  Her pain level just depends on what she is doing.  She also reports a history of low back pain and has undergone GUANAKO and medial branch blocks/RF ablations in the past.  Of note, she does have a Nevro SCS but states she does not have coverage in the area of her tailbone.  She underwent lumbar medial branch blocks a couple of weeks ago and has been getting good relief since then.  She states that her pain was rated 7/10 on the NRS today after the injections, and 8/10 the day after that.  Then her pain was minimal to none.  Today she rates her pain as 3/10.  She has used some ice packs and icy hot for relief as needed.  She states that the neuropathy in her right foot wakes her up at night and she is almost out of gabapentin.  She is requesting a refill on this today.  She is also complaining of increasing neck pain.  She has undergone cervical spine " surgery but has not undergone any previous injections for her neck pain.  She is interested in having some done      Pain  Associated symptoms include headaches, numbness and weakness.   Low-back Pain  Associated symptoms include headaches, leg pain, numbness and weakness.   Review of Systems     Review of Systems   Musculoskeletal:  Positive for back pain, gait problem and leg pain.   Neurological:  Positive for weakness, numbness and headaches.   All other systems reviewed and are negative.     Medical / Social / Family History     Past Medical History:   Diagnosis Date    Chronic back pain     GERD (gastroesophageal reflux disease)     HTN (hypertension)     Syringomyelia        Past Surgical History:   Procedure Laterality Date    ANKLE SURGERY Right     CARPAL TUNNEL RELEASE       SECTION      CHOLECYSTECTOMY      HERNIA REPAIR      HYSTERECTOMY      INJECTION OF ANESTHETIC AGENT AROUND MEDIAL BRANCH NERVES INNERVATING LUMBAR FACET JOINT Bilateral 3/9/2023    Procedure: BLOCK, NERVE, FACET JOINT, LUMBAR, MEDIAL BRANCH Bilateral L3-5;  Surgeon: Vicky Perez MD;  Location: [facility];  Service: Pain Management;  Laterality: Bilateral;  L3-5    neck fusion      SPINAL CORD STIMULATOR IMPLANT      TONSILLECTOMY      TOTAL KNEE REPLACEMENT USING COMPUTER NAVIGATION Bilateral        Social History  Ms. Bruno  reports that she has never smoked. She has never used smokeless tobacco. She reports that she does not currently use alcohol. She reports that she does not use drugs.    Family History  Ms.'s Bruno Family history is unknown by patient.    Medications and Allergies     Medications  Outpatient Medications Marked as Taking for the 3/24/23 encounter (Office Visit) with Vicky Perez MD   Medication Sig Dispense Refill    ascorbic acid, vitamin C, (VITAMIN C) 1000 MG tablet Take 1,000 mg by mouth.      aspirin (ECOTRIN) 81 MG EC tablet Take 81 mg by mouth.      B-complex with vitamin C (Z-BEC OR  EQUIV) tablet Take 1 tablet by mouth once daily.      calcium carbonate (OS-CARI) 500 mg calcium (1,250 mg) tablet Take by mouth.      calcium-vitamin D 600 mg-10 mcg (400 unit) Tab Take 1 tablet by mouth once daily.      carboxymethylcellulose (REFRESH PLUS) 0.5 % Dpet Apply 1 drop to eye daily as needed.      cetirizine (ZYRTEC) 10 MG tablet Take 10 mg by mouth once daily.      diclofenac sodium (VOLTAREN) 1 % Gel Apply topically daily as needed.      EScitalopram oxalate (LEXAPRO) 20 MG tablet Take 20 mg by mouth nightly.      famotidine (PEPCID) 40 MG tablet Take 40 mg by mouth nightly.      furosemide (LASIX) 20 MG tablet Take 20 mg by mouth once daily.      glucosamine/chondr rivera A sod (OSTEO BI-FLEX ORAL) Take by mouth.      hyoscyamine (ANASPAZ,LEVSIN) 0.125 mg Tab Take 125 mcg by mouth 2 (two) times daily.      midodrine (PROAMATINE) 2.5 MG Tab Take 2.5 mg by mouth 2 (two) times daily.      multivitamin with folic acid 400 mcg Tab Take 1 tablet by mouth once daily.      MYRBETRIQ 25 mg Tb24 ER tablet Take 25 mg by mouth once daily.      omega 3-dha-epa-fish oil 1,000 mg (120 mg-180 mg) Cap Take 1 capsule by mouth once daily.      pantoprazole (PROTONIX) 40 MG tablet Take 40 mg by mouth 2 (two) times daily.      traZODone (DESYREL) 50 MG tablet Take 50 mg by mouth nightly.      turmeric root extract 500 mg Cap Take 1 capsule by mouth once daily.      valACYclovir (VALTREX) 500 MG tablet Take 500 mg by mouth once daily.      vitamin E 400 UNIT capsule Take 400 Units by mouth.      zonisamide (ZONEGRAN) 100 MG Cap Take 2 capsules (200 mg total) by mouth nightly. 180 capsule 1    [DISCONTINUED] gabapentin (NEURONTIN) 600 MG tablet Take 600 mg by mouth 3 (three) times daily.         Allergies  Review of patient's allergies indicates:   Allergen Reactions    Tizanidine Shortness Of Breath     Stops breathing    Pollen extracts Other (See Comments)       Physical Examination     Vitals:    03/24/23 1032   BP: (!)  163/95   Pulse: 87   Temp: 97.8 °F (36.6 °C)       Spine Musculoskeletal Exam    Gait    Gait is normal.    General      Constitutional: appears stated age, well-developed and well-nourished    Scleral icterus: no    Labored breathing: no    Psychiatric: normal mood and affect and no acute distress    Neurological: alert and oriented x3    Skin: intact    Lymphadenopathy: none     Assessment and Plan (including Health Maintenance)      Problem List  Smart Sets  Document Outside HM   :    Plan:   Lumbar spondylosis  -     gabapentin (NEURONTIN) 600 MG tablet; Take 1 tablet (600 mg total) by mouth 3 (three) times daily.  Dispense: 90 tablet; Refill: 2    Lumbar degenerative disc disease  -     gabapentin (NEURONTIN) 600 MG tablet; Take 1 tablet (600 mg total) by mouth 3 (three) times daily.  Dispense: 90 tablet; Refill: 2    Chronic neck pain  -     MRI Cervical Spine Without Contrast; Future; Expected date: 03/24/2023    Chronic back pain greater than 3 months duration    Cervicalgia      I am sending in a refill of gabapentin for her today.  I am also obtaining an MRI of the cervical spine without contrast for further evaluation of her worsening neck pain.  She will return to clinic in 2 months.  She states that she goes [place] of [place] during the summer for 4 months and is planning to leave at the beginning of July.  She would like to have another injection done before she leaves.    Problem List Items Addressed This Visit       Chronic back pain greater than 3 months duration    Lumbar spondylosis - Primary    Relevant Medications    gabapentin (NEURONTIN) 600 MG tablet    Lumbar degenerative disc disease    Relevant Medications    gabapentin (NEURONTIN) 600 MG tablet     Other Visit Diagnoses       Chronic neck pain        Relevant Orders    MRI Cervical Spine Without Contrast    Cervicalgia                    Future Appointments   Date Time Provider Department Center   4/3/2023 10:00 AM OL XR4 FL2 820 LB LIMIT  [facility]    5/25/2023  1:00 PM Vicky Perez MD [facility] [place]          There are no Patient Instructions on file for this visit.  No follow-ups on file.     Signature:  Vicky Perez MD      Date of encounter: 3/24/23

## 2023-04-03 ENCOUNTER — HOSPITAL ENCOUNTER (OUTPATIENT)
Dept: RADIOLOGY | Facility: HOSPITAL | Age: 78
Discharge: HOME | End: 2023-04-03
Attending: NURSE PRACTITIONER
Payer: MEDICARE

## 2023-04-03 ENCOUNTER — TELEPHONE (OUTPATIENT)
Dept: PAIN MEDICINE | Facility: CLINIC | Age: 78
End: 2023-04-03
Payer: MEDICARE

## 2023-04-03 DIAGNOSIS — G89.29 CHRONIC NECK PAIN: Primary | ICD-10-CM

## 2023-04-03 DIAGNOSIS — M54.2 CHRONIC NECK PAIN: Primary | ICD-10-CM

## 2023-04-03 DIAGNOSIS — R13.12 OROPHARYNGEAL DYSPHAGIA: ICD-10-CM

## 2023-04-03 PROCEDURE — A9698 NON-RAD CONTRAST MATERIALNOC: HCPCS | Performed by: NURSE PRACTITIONER

## 2023-04-03 PROCEDURE — 74220 X-RAY XM ESOPHAGUS 1CNTRST: CPT | Mod: TC

## 2023-04-03 PROCEDURE — 25500020 PHARM REV CODE 255: Performed by: NURSE PRACTITIONER

## 2023-04-03 RX ADMIN — BARIUM SULFATE 100 ML: 0.6 SUSPENSION ORAL at 10:04

## 2023-04-03 RX ADMIN — BARIUM SULFATE 100 ML: 980 POWDER, FOR SUSPENSION ORAL at 10:04

## 2023-04-03 NOTE — TELEPHONE ENCOUNTER
Patient called to report that she cannot have MRI because she has a stimulator which she reports is not compatible since she fell.

## 2023-04-13 ENCOUNTER — HOSPITAL ENCOUNTER (OUTPATIENT)
Dept: RADIOLOGY | Facility: HOSPITAL | Age: 78
Discharge: HOME | End: 2023-04-13
Attending: ANESTHESIOLOGY
Payer: MEDICARE

## 2023-04-13 DIAGNOSIS — G89.29 CHRONIC NECK PAIN: ICD-10-CM

## 2023-04-13 DIAGNOSIS — M54.2 CHRONIC NECK PAIN: ICD-10-CM

## 2023-04-13 PROCEDURE — 72125 CT NECK SPINE W/O DYE: CPT | Mod: TC

## 2023-04-21 ENCOUNTER — PATIENT MESSAGE (OUTPATIENT)
Dept: PAIN MEDICINE | Facility: CLINIC | Age: 78
End: 2023-04-21
Payer: MEDICARE

## 2023-05-02 ENCOUNTER — OFFICE VISIT (OUTPATIENT)
Dept: PAIN MEDICINE | Facility: CLINIC | Age: 78
End: 2023-05-02
Payer: MEDICARE

## 2023-05-02 VITALS
BODY MASS INDEX: 24.84 KG/M2 | HEIGHT: 62 IN | WEIGHT: 135 LBS | SYSTOLIC BLOOD PRESSURE: 159 MMHG | HEART RATE: 69 BPM | DIASTOLIC BLOOD PRESSURE: 93 MMHG | TEMPERATURE: 98 F

## 2023-05-02 DIAGNOSIS — G89.29 CHRONIC NECK PAIN: ICD-10-CM

## 2023-05-02 DIAGNOSIS — M54.9 CHRONIC BACK PAIN GREATER THAN 3 MONTHS DURATION: ICD-10-CM

## 2023-05-02 DIAGNOSIS — G89.29 CHRONIC BACK PAIN GREATER THAN 3 MONTHS DURATION: ICD-10-CM

## 2023-05-02 DIAGNOSIS — M54.2 CHRONIC NECK PAIN: ICD-10-CM

## 2023-05-02 DIAGNOSIS — M54.12 CERVICAL RADICULITIS: ICD-10-CM

## 2023-05-02 DIAGNOSIS — M54.81 BILATERAL OCCIPITAL NEURALGIA: ICD-10-CM

## 2023-05-02 DIAGNOSIS — M51.36 LUMBAR DEGENERATIVE DISC DISEASE: ICD-10-CM

## 2023-05-02 DIAGNOSIS — M47.816 LUMBAR SPONDYLOSIS: Primary | ICD-10-CM

## 2023-05-02 PROCEDURE — 3077F PR MOST RECENT SYSTOLIC BLOOD PRESSURE >= 140 MM HG: ICD-10-PCS | Mod: CPTII,,, | Performed by: ANESTHESIOLOGY

## 2023-05-02 PROCEDURE — 1160F PR REVIEW ALL MEDS BY PRESCRIBER/CLIN PHARMACIST DOCUMENTED: ICD-10-PCS | Mod: CPTII,,, | Performed by: ANESTHESIOLOGY

## 2023-05-02 PROCEDURE — 3288F FALL RISK ASSESSMENT DOCD: CPT | Mod: CPTII,,, | Performed by: ANESTHESIOLOGY

## 2023-05-02 PROCEDURE — 3288F PR FALLS RISK ASSESSMENT DOCUMENTED: ICD-10-PCS | Mod: CPTII,,, | Performed by: ANESTHESIOLOGY

## 2023-05-02 PROCEDURE — 1160F RVW MEDS BY RX/DR IN RCRD: CPT | Mod: CPTII,,, | Performed by: ANESTHESIOLOGY

## 2023-05-02 PROCEDURE — 64405 PR NERVE BLOCK INJ, ANES/STEROID, OCCIPITAL: ICD-10-PCS | Mod: 50,,, | Performed by: ANESTHESIOLOGY

## 2023-05-02 PROCEDURE — 3080F PR MOST RECENT DIASTOLIC BLOOD PRESSURE >= 90 MM HG: ICD-10-PCS | Mod: CPTII,,, | Performed by: ANESTHESIOLOGY

## 2023-05-02 PROCEDURE — 64405 NJX AA&/STRD GR OCPL NRV: CPT | Mod: 50,,, | Performed by: ANESTHESIOLOGY

## 2023-05-02 PROCEDURE — 99215 PR OFFICE/OUTPT VISIT, EST, LEVL V, 40-54 MIN: ICD-10-PCS | Mod: 25,,, | Performed by: ANESTHESIOLOGY

## 2023-05-02 PROCEDURE — 3077F SYST BP >= 140 MM HG: CPT | Mod: CPTII,,, | Performed by: ANESTHESIOLOGY

## 2023-05-02 PROCEDURE — 1101F PR PT FALLS ASSESS DOC 0-1 FALLS W/OUT INJ PAST YR: ICD-10-PCS | Mod: CPTII,,, | Performed by: ANESTHESIOLOGY

## 2023-05-02 PROCEDURE — 3080F DIAST BP >= 90 MM HG: CPT | Mod: CPTII,,, | Performed by: ANESTHESIOLOGY

## 2023-05-02 PROCEDURE — 1159F PR MEDICATION LIST DOCUMENTED IN MEDICAL RECORD: ICD-10-PCS | Mod: CPTII,,, | Performed by: ANESTHESIOLOGY

## 2023-05-02 PROCEDURE — 1159F MED LIST DOCD IN RCRD: CPT | Mod: CPTII,,, | Performed by: ANESTHESIOLOGY

## 2023-05-02 PROCEDURE — 99215 OFFICE O/P EST HI 40 MIN: CPT | Mod: 25,,, | Performed by: ANESTHESIOLOGY

## 2023-05-02 PROCEDURE — 1101F PT FALLS ASSESS-DOCD LE1/YR: CPT | Mod: CPTII,,, | Performed by: ANESTHESIOLOGY

## 2023-05-02 RX ORDER — BUPIVACAINE HYDROCHLORIDE 5 MG/ML
2 INJECTION, SOLUTION EPIDURAL; INTRACAUDAL
Status: COMPLETED | OUTPATIENT
Start: 2023-05-02 | End: 2023-05-02

## 2023-05-02 RX ADMIN — BUPIVACAINE HYDROCHLORIDE 10 MG: 5 INJECTION, SOLUTION EPIDURAL; INTRACAUDAL at 02:05

## 2023-05-02 NOTE — PROGRESS NOTES
"     Vicky Perez MD        PATIENT NAME: Elizabeth Bruno  : 1945  DATE: 23  MRN: [id number]      Billing Provider: Vicky Perez MD  Level of Service:   Patient PCP Information       Provider PCP Type    Nette Marroquin MD General            Reason for Visit / Chief Complaint: Back Pain (Patient is here for neck and back pain. Rates pain today at 6/10 more in tail bone.  She is also here for Occipital injection.  She also reports having difficulty with her stimulator. )       Update PCP  Update Chief Complaint         History of Present Illness / Problem Focused Workflow     Elizabeth Bruno presents to the clinic with Back Pain (Patient is here for neck and back pain. Rates pain today at 6/10 more in tail bone.  She is also here for Occipital injection.  She also reports having difficulty with her stimulator. )     This is a 78-year-old female who returns to clinic today for follow up of her chronic neck pain and occipital neuralgia.  She was getting pretty good relief from occipital nerve blocks done in January, but the pain has now completely returned.  She is asking to have the injections done again today.  She notes that she started feeling her Nevro spinal cord stimulator about 2-1/2 weeks ago.  She denies any injury or falls.  She states that her battery is not even charged, and even when it was working before, she did not feel the stimulation.  She is feeling it in the right side of her chest and shoulder.  She currently rates her pain as 6/10 on the NRS.  She also notes that occasionally her left hand will "lock up".  This started in November and has been happening a little more frequently.  It was happening once or twice per month, but happened 3 times last month.      Pain  Associated symptoms include headaches, numbness and weakness.   Low-back Pain  Associated symptoms include headaches, leg pain, numbness and weakness.   Back Pain  Associated symptoms include headaches, leg pain, " numbness and weakness.   Review of Systems     Review of Systems   Musculoskeletal:  Positive for back pain, gait problem and leg pain.   Neurological:  Positive for weakness, numbness and headaches.   All other systems reviewed and are negative.     Medical / Social / Family History     Past Medical History:   Diagnosis Date    Chronic back pain     GERD (gastroesophageal reflux disease)     HTN (hypertension)     Syringomyelia        Past Surgical History:   Procedure Laterality Date    ANKLE SURGERY Right     CARPAL TUNNEL RELEASE       SECTION      CHOLECYSTECTOMY      HERNIA REPAIR      HYSTERECTOMY      INJECTION OF ANESTHETIC AGENT AROUND MEDIAL BRANCH NERVES INNERVATING LUMBAR FACET JOINT Bilateral 3/9/2023    Procedure: BLOCK, NERVE, FACET JOINT, LUMBAR, MEDIAL BRANCH Bilateral L3-5;  Surgeon: Vicky Perez MD;  Location: [facility];  Service: Pain Management;  Laterality: Bilateral;  L3-5    neck fusion      SPINAL CORD STIMULATOR IMPLANT      TONSILLECTOMY      TOTAL KNEE REPLACEMENT USING COMPUTER NAVIGATION Bilateral        Social History  Ms. Bruno  reports that she has never smoked. She has never used smokeless tobacco. She reports that she does not currently use alcohol. She reports that she does not use drugs.    Family History  Ms.'s Bruno Family history is unknown by patient.    Medications and Allergies     Medications  Outpatient Medications Marked as Taking for the 23 encounter (Office Visit) with Vicky Perez MD   Medication Sig Dispense Refill    ascorbic acid, vitamin C, (VITAMIN C) 1000 MG tablet Take 1,000 mg by mouth.      aspirin (ECOTRIN) 81 MG EC tablet Take 81 mg by mouth.      B-complex with vitamin C (Z-BEC OR EQUIV) tablet Take 1 tablet by mouth once daily.      calcium carbonate (OS-CARI) 500 mg calcium (1,250 mg) tablet Take by mouth.      calcium-vitamin D 600 mg-10 mcg (400 unit) Tab Take 1 tablet by mouth once daily.      carboxymethylcellulose  (REFRESH PLUS) 0.5 % Dpet Apply 1 drop to eye daily as needed.      cetirizine (ZYRTEC) 10 MG tablet Take 10 mg by mouth once daily.      diclofenac sodium (VOLTAREN) 1 % Gel Apply topically daily as needed.      EScitalopram oxalate (LEXAPRO) 20 MG tablet Take 20 mg by mouth nightly.      famotidine (PEPCID) 40 MG tablet Take 40 mg by mouth nightly.      furosemide (LASIX) 20 MG tablet Take 20 mg by mouth once daily.      gabapentin (NEURONTIN) 600 MG tablet Take 1 tablet (600 mg total) by mouth 3 (three) times daily. 90 tablet 2    glucosamine/chondr rivera A sod (OSTEO BI-FLEX ORAL) Take by mouth.      hyoscyamine (ANASPAZ,LEVSIN) 0.125 mg Tab Take 125 mcg by mouth 2 (two) times daily.      midodrine (PROAMATINE) 2.5 MG Tab Take 2.5 mg by mouth 2 (two) times daily.      multivitamin with folic acid 400 mcg Tab Take 1 tablet by mouth once daily.      MYRBETRIQ 25 mg Tb24 ER tablet Take 25 mg by mouth once daily.      omega 3-dha-epa-fish oil 1,000 mg (120 mg-180 mg) Cap Take 1 capsule by mouth once daily.      pantoprazole (PROTONIX) 40 MG tablet Take 40 mg by mouth 2 (two) times daily.      traZODone (DESYREL) 50 MG tablet Take 50 mg by mouth nightly.      turmeric root extract 500 mg Cap Take 1 capsule by mouth once daily.      valACYclovir (VALTREX) 500 MG tablet Take 500 mg by mouth once daily.      vitamin E 400 UNIT capsule Take 400 Units by mouth.      zonisamide (ZONEGRAN) 100 MG Cap Take 2 capsules (200 mg total) by mouth nightly. 180 capsule 1       Allergies  Review of patient's allergies indicates:   Allergen Reactions    Tizanidine Shortness Of Breath     Stops breathing    Pollen extracts Other (See Comments)       Physical Examination     Vitals:    05/02/23 1049   BP: (!) 159/93   Pulse: 69   Temp: 98.2 °F (36.8 °C)       Spine Musculoskeletal Exam    Gait    Gait is normal.    General      Constitutional: appears stated age, well-developed and well-nourished    Scleral icterus: no    Labored  "breathing: no    Psychiatric: normal mood and affect and no acute distress    Neurological: alert and oriented x3    Skin: intact    Lymphadenopathy: none     Assessment and Plan (including Health Maintenance)      Problem List  Smart Sets  Document Outside HM   :    Plan:   Lumbar spondylosis    Lumbar degenerative disc disease    Chronic back pain greater than 3 months duration    Chronic neck pain  -     Ambulatory referral/consult to Neurology; Future; Expected date: 05/09/2023    Cervical radiculitis  -     Ambulatory referral/consult to Neurology; Future; Expected date: 05/09/2023      Bilateral occipital nerve blocks done in clinic today as described below.  I am also referring her for a nerve conduction study/EMG of the left upper extremity.  She is getting scheduled for bilateral C4-6 diagnostic medial branch blocks.    PROCEDURE:   BILATERAL OCCIPITAL NERVE BLOCKS  The patient was placed in a seated position. The site of pain and procedure were confirmed with the patient prior to starting the procedure. The patient's nuchal ridge of the occipital bone was identified and prepped with povidone-iodine. A 27 gauge  1" needle was advanced through the skin and subcutaneous tissues lateral to the occipital protuberance in the area of the left Greater Occipital Nerve.  Negative aspiration was confrimed.  A total of 1 ml of 2% lidocaine and 3 mg of betamethasone were injected.  There were no signs or symptoms of intravascular injection. The same procedure was repeated in identical fashion on the right side.  No complications were evident. No specimens collected.    Problem List Items Addressed This Visit       Chronic back pain greater than 3 months duration    Lumbar spondylosis - Primary    Lumbar degenerative disc disease     Other Visit Diagnoses       Chronic neck pain        Relevant Orders    Ambulatory referral/consult to Neurology    Cervical radiculitis        Relevant Orders    Ambulatory " referral/consult to Neurology                Future Appointments   Date Time Provider Department Center   6/27/2023 10:45 AM Vicky Perez MD [facility]            There are no Patient Instructions on file for this visit.  No follow-ups on file.     Signature:  Vicky Perez MD      Date of encounter: 5/2/23

## 2023-06-07 ENCOUNTER — ANESTHESIA EVENT (OUTPATIENT)
Dept: SURGERY | Facility: HOSPITAL | Age: 78
End: 2023-06-07
Payer: MEDICARE

## 2023-06-14 ENCOUNTER — ANESTHESIA (OUTPATIENT)
Dept: SURGERY | Facility: HOSPITAL | Age: 78
End: 2023-06-14
Payer: MEDICARE

## 2023-06-14 ENCOUNTER — HOSPITAL ENCOUNTER (OUTPATIENT)
Facility: HOSPITAL | Age: 78
Discharge: HOME | End: 2023-06-14
Attending: ANESTHESIOLOGY | Admitting: ANESTHESIOLOGY
Payer: MEDICARE

## 2023-06-14 DIAGNOSIS — G89.29 CHRONIC NECK PAIN: ICD-10-CM

## 2023-06-14 DIAGNOSIS — M54.2 CHRONIC NECK PAIN: ICD-10-CM

## 2023-06-14 PROCEDURE — 64491 PR INJ DX/THER AGNT PARAVERT FACET JOINT,IMG GUIDE,CERV/THORAC, 2ND LEVEL: ICD-10-PCS | Mod: 50,,, | Performed by: ANESTHESIOLOGY

## 2023-06-14 PROCEDURE — D9220A PRA ANESTHESIA: ICD-10-PCS | Mod: ,,,

## 2023-06-14 PROCEDURE — 64491 INJ PARAVERT F JNT C/T 2 LEV: CPT | Mod: 50 | Performed by: ANESTHESIOLOGY

## 2023-06-14 PROCEDURE — D9220A PRA ANESTHESIA: Mod: ,,,

## 2023-06-14 PROCEDURE — 37000008 HC ANESTHESIA 1ST 15 MINUTES: Performed by: ANESTHESIOLOGY

## 2023-06-14 PROCEDURE — 25000003 PHARM REV CODE 250: Performed by: ANESTHESIOLOGY

## 2023-06-14 PROCEDURE — 63600175 PHARM REV CODE 636 W HCPCS

## 2023-06-14 PROCEDURE — 64490 INJ PARAVERT F JNT C/T 1 LEV: CPT | Mod: 50,,, | Performed by: ANESTHESIOLOGY

## 2023-06-14 PROCEDURE — 64490 INJ PARAVERT F JNT C/T 1 LEV: CPT | Mod: 50 | Performed by: ANESTHESIOLOGY

## 2023-06-14 PROCEDURE — 64490 PR INJ DX/THER AGNT PARAVERT FACET JOINT,IMG GUIDE,CERV/THORAC, 1ST LEVEL: ICD-10-PCS | Mod: 50,,, | Performed by: ANESTHESIOLOGY

## 2023-06-14 PROCEDURE — 25000003 PHARM REV CODE 250

## 2023-06-14 PROCEDURE — 64491 INJ PARAVERT F JNT C/T 2 LEV: CPT | Mod: 50,,, | Performed by: ANESTHESIOLOGY

## 2023-06-14 RX ORDER — TRIAMCINOLONE ACETONIDE 40 MG/ML
INJECTION, SUSPENSION INTRA-ARTICULAR; INTRAMUSCULAR
Status: DISCONTINUED
Start: 2023-06-14 | End: 2023-06-14 | Stop reason: WASHOUT

## 2023-06-14 RX ORDER — TRIAMCINOLONE ACETONIDE 40 MG/ML
INJECTION, SUSPENSION INTRA-ARTICULAR; INTRAMUSCULAR
Status: DISCONTINUED | OUTPATIENT
Start: 2023-06-14 | End: 2023-06-14 | Stop reason: HOSPADM

## 2023-06-14 RX ORDER — BUPIVACAINE HYDROCHLORIDE 2.5 MG/ML
INJECTION, SOLUTION EPIDURAL; INFILTRATION; INTRACAUDAL
Status: DISCONTINUED
Start: 2023-06-14 | End: 2023-06-14 | Stop reason: HOSPADM

## 2023-06-14 RX ORDER — PROPOFOL 10 MG/ML
VIAL (ML) INTRAVENOUS
Status: DISCONTINUED | OUTPATIENT
Start: 2023-06-14 | End: 2023-06-14

## 2023-06-14 RX ORDER — BUPIVACAINE HYDROCHLORIDE 2.5 MG/ML
INJECTION, SOLUTION EPIDURAL; INFILTRATION; INTRACAUDAL
Status: DISCONTINUED | OUTPATIENT
Start: 2023-06-14 | End: 2023-06-14 | Stop reason: HOSPADM

## 2023-06-14 RX ORDER — LIDOCAINE HYDROCHLORIDE 10 MG/ML
INJECTION, SOLUTION EPIDURAL; INFILTRATION; INTRACAUDAL; PERINEURAL
Status: DISCONTINUED
Start: 2023-06-14 | End: 2023-06-14 | Stop reason: HOSPADM

## 2023-06-14 RX ORDER — LIDOCAINE HYDROCHLORIDE 10 MG/ML
INJECTION, SOLUTION EPIDURAL; INFILTRATION; INTRACAUDAL; PERINEURAL
Status: DISCONTINUED | OUTPATIENT
Start: 2023-06-14 | End: 2023-06-14 | Stop reason: HOSPADM

## 2023-06-14 RX ORDER — LIDOCAINE HYDROCHLORIDE 10 MG/ML
INJECTION, SOLUTION EPIDURAL; INFILTRATION; INTRACAUDAL; PERINEURAL
Status: DISCONTINUED | OUTPATIENT
Start: 2023-06-14 | End: 2023-06-14

## 2023-06-14 RX ADMIN — SODIUM CHLORIDE, SODIUM GLUCONATE, SODIUM ACETATE, POTASSIUM CHLORIDE AND MAGNESIUM CHLORIDE: 526; 502; 368; 37; 30 INJECTION, SOLUTION INTRAVENOUS at 11:06

## 2023-06-14 RX ADMIN — LIDOCAINE HYDROCHLORIDE 50 MG: 10 INJECTION, SOLUTION EPIDURAL; INFILTRATION; INTRACAUDAL; PERINEURAL at 11:06

## 2023-06-14 RX ADMIN — PROPOFOL 20 MG: 10 INJECTION, EMULSION INTRAVENOUS at 12:06

## 2023-06-14 RX ADMIN — PROPOFOL 60 MG: 10 INJECTION, EMULSION INTRAVENOUS at 11:06

## 2023-06-14 NOTE — H&P
"Reason for Visit / Chief Complaint: Back Pain (Patient is here for neck and back pain. Rates pain today at 6/10 more in tail bone.  She is also here for Occipital injection.  She also reports having difficulty with her stimulator. )         Update PCP   Update Chief Complaint             History of Present Illness / Problem Focused Workflow      Elizabeth Bruno presents to the clinic with Back Pain (Patient is here for neck and back pain. Rates pain today at 6/10 more in tail bone.  She is also here for Occipital injection.  She also reports having difficulty with her stimulator. )     This is a 78-year-old female who returns to clinic today for follow up of her chronic neck pain and occipital neuralgia.  She was getting pretty good relief from occipital nerve blocks done in January, but the pain has now completely returned.  She is asking to have the injections done again today.  She notes that she started feeling her Nevro spinal cord stimulator about 2-1/2 weeks ago.  She denies any injury or falls.  She states that her battery is not even charged, and even when it was working before, she did not feel the stimulation.  She is feeling it in the right side of her chest and shoulder.  She currently rates her pain as 6/10 on the NRS.  She also notes that occasionally her left hand will "lock up".  This started in November and has been happening a little more frequently.  It was happening once or twice per month, but happened 3 times last month.        Pain  Associated symptoms include headaches, numbness and weakness.   Low-back Pain  Associated symptoms include headaches, leg pain, numbness and weakness.   Back Pain  Associated symptoms include headaches, leg pain, numbness and weakness.   Review of Systems      Review of Systems   Musculoskeletal:  Positive for back pain, gait problem and leg pain.   Neurological:  Positive for weakness, numbness and headaches.   All other systems reviewed and are negative.    "   Medical / Social / Family History           Past Medical History:   Diagnosis Date    Chronic back pain      GERD (gastroesophageal reflux disease)      HTN (hypertension)      Syringomyelia                 Past Surgical History:   Procedure Laterality Date    ANKLE SURGERY Right      CARPAL TUNNEL RELEASE         SECTION        CHOLECYSTECTOMY        HERNIA REPAIR        HYSTERECTOMY        INJECTION OF ANESTHETIC AGENT AROUND MEDIAL BRANCH NERVES INNERVATING LUMBAR FACET JOINT Bilateral 3/9/2023     Procedure: BLOCK, NERVE, FACET JOINT, LUMBAR, MEDIAL BRANCH Bilateral L3-5;  Surgeon: Vicky Perez MD;  Location: [facility];  Service: Pain Management;  Laterality: Bilateral;  L3-5    neck fusion        SPINAL CORD STIMULATOR IMPLANT        TONSILLECTOMY        TOTAL KNEE REPLACEMENT USING COMPUTER NAVIGATION Bilateral           Social History  Ms. Bruno  reports that she has never smoked. She has never used smokeless tobacco. She reports that she does not currently use alcohol. She reports that she does not use drugs.     Family History  Ms.'s Bruno Family history is unknown by patient.     Medications and Allergies      Medications         Outpatient Medications Marked as Taking for the 23 encounter (Office Visit) with Vicky Perez MD   Medication Sig Dispense Refill    ascorbic acid, vitamin C, (VITAMIN C) 1000 MG tablet Take 1,000 mg by mouth.        aspirin (ECOTRIN) 81 MG EC tablet Take 81 mg by mouth.        B-complex with vitamin C (Z-BEC OR EQUIV) tablet Take 1 tablet by mouth once daily.        calcium carbonate (OS-CARI) 500 mg calcium (1,250 mg) tablet Take by mouth.        calcium-vitamin D 600 mg-10 mcg (400 unit) Tab Take 1 tablet by mouth once daily.        carboxymethylcellulose (REFRESH PLUS) 0.5 % Dpet Apply 1 drop to eye daily as needed.        cetirizine (ZYRTEC) 10 MG tablet Take 10 mg by mouth once daily.        diclofenac sodium (VOLTAREN) 1 % Gel Apply topically  daily as needed.        EScitalopram oxalate (LEXAPRO) 20 MG tablet Take 20 mg by mouth nightly.        famotidine (PEPCID) 40 MG tablet Take 40 mg by mouth nightly.        furosemide (LASIX) 20 MG tablet Take 20 mg by mouth once daily.        gabapentin (NEURONTIN) 600 MG tablet Take 1 tablet (600 mg total) by mouth 3 (three) times daily. 90 tablet 2    glucosamine/chondr rivera A sod (OSTEO BI-FLEX ORAL) Take by mouth.        hyoscyamine (ANASPAZ,LEVSIN) 0.125 mg Tab Take 125 mcg by mouth 2 (two) times daily.        midodrine (PROAMATINE) 2.5 MG Tab Take 2.5 mg by mouth 2 (two) times daily.        multivitamin with folic acid 400 mcg Tab Take 1 tablet by mouth once daily.        MYRBETRIQ 25 mg Tb24 ER tablet Take 25 mg by mouth once daily.        omega 3-dha-epa-fish oil 1,000 mg (120 mg-180 mg) Cap Take 1 capsule by mouth once daily.        pantoprazole (PROTONIX) 40 MG tablet Take 40 mg by mouth 2 (two) times daily.        traZODone (DESYREL) 50 MG tablet Take 50 mg by mouth nightly.        turmeric root extract 500 mg Cap Take 1 capsule by mouth once daily.        valACYclovir (VALTREX) 500 MG tablet Take 500 mg by mouth once daily.        vitamin E 400 UNIT capsule Take 400 Units by mouth.        zonisamide (ZONEGRAN) 100 MG Cap Take 2 capsules (200 mg total) by mouth nightly. 180 capsule 1         Allergies        Review of patient's allergies indicates:   Allergen Reactions    Tizanidine Shortness Of Breath       Stops breathing    Pollen extracts Other (See Comments)         Physical Examination          Vitals:     05/02/23 1049   BP: (!) 159/93   Pulse: 69   Temp: 98.2 °F (36.8 °C)         Spine Musculoskeletal Exam     Gait    Gait is normal.     General      Constitutional: appears stated age, well-developed and well-nourished    Scleral icterus: no    Labored breathing: no    Psychiatric: normal mood and affect and no acute distress    Neurological: alert and oriented x3    Skin: intact     Lymphadenopathy: none      Assessment and Plan (including Health Maintenance)       Problem List   Smart Sets   Document Outside HM   :     Plan:   Lumbar spondylosis     Lumbar degenerative disc disease     Chronic back pain greater than 3 months duration     Chronic neck pain  -     Ambulatory referral/consult to Neurology; Future; Expected date: 05/09/2023     Cervical radiculitis  -     Ambulatory referral/consult to Neurology; Future; Expected date: 05/09/2023

## 2023-06-14 NOTE — ANESTHESIA PREPROCEDURE EVALUATION
06/14/2023  Elizabeth Bruno is a 78 y.o., female.      Pre-op Assessment    I have reviewed the Patient Summary Reports.    I have reviewed the NPO Status.   I have reviewed the Medications.     Review of Systems  Anesthesia Hx:   Denies Personal Hx of Anesthesia complications.   Social:  Non-Smoker    Cardiovascular:   Hypertension  Functional Capacity 3 METS  Valvular Heart Disease: Aortic Stenosis (AS), moderate      Hepatic/GI:   GERD        Physical Exam  General: Well nourished, Cooperative, Alert and Oriented    Airway:  Mallampati: II   Mouth Opening: Normal  TM Distance: Normal  Tongue: Normal  Neck ROM: Normal ROM    Dental:  Intact        Anesthesia Plan  Type of Anesthesia, risks & benefits discussed:    Anesthesia Type: Gen Natural Airway  Intra-op Monitoring Plan: Standard ASA Monitors  Induction:  IV  Informed Consent: Informed consent signed with the Patient and all parties understand the risks and agree with anesthesia plan.  All questions answered.   ASA Score: 3  Day of Surgery Review of History & Physical: H&P Update referred to the surgeon/provider.    Ready For Surgery From Anesthesia Perspective.     .

## 2023-06-14 NOTE — TRANSFER OF CARE
"Anesthesia Transfer of Care Note    Patient: Elizabeth Bruno    Procedure(s) Performed: Procedure(s) (LRB):  Block-nerve-medial branch-cervical (Bilateral)    Patient location: OPS    Anesthesia Type: MAC    Transport from OR: Transported from OR on room air with adequate spontaneous ventilation    Post pain: adequate analgesia    Post assessment: no apparent anesthetic complications    Post vital signs: stable    Level of consciousness: awake    Nausea/Vomiting: no nausea/vomiting    Complications: none    Transfer of care protocol was followed      Last vitals:   Visit Vitals  /82   Pulse 70   Temp 36.5 °C (97.7 °F) (Tympanic)   Resp 20   Ht 5' 2" (1.575 m)   Wt 63.4 kg (139 lb 12.4 oz)   SpO2 97%   Breastfeeding No   BMI 25.56 kg/m²     "

## 2023-06-14 NOTE — DISCHARGE SUMMARY
[facility] - Periop Services  Discharge Note  Short Stay    Procedure(s) (LRB):  Block-nerve-medial branch-cervical (Bilateral)      OUTCOME: Patient tolerated treatment/procedure well without complication and is now ready for discharge.    DISPOSITION: Home or Self Care    FINAL DIAGNOSIS:  <principal problem not specified>    FOLLOWUP: In clinic    DISCHARGE INSTRUCTIONS:  No discharge procedures on file.     TIME SPENT ON DISCHARGE: 5 minutes

## 2023-06-14 NOTE — OP NOTE
Bilateral C4-6 diagnostic medial branch blocks    Pre-Procedure Diagnoses:  1. Chronic pain syndrome  2. Cervicalgia  3. Cervical facet arthropathy    Post-Procedure Diagnoses:  1. Chronic pain syndrome  2. Cervicalgia  3. Cervical facet arthropathy    Anesthesia:  Local and MAC    Estimated Blood Loss:  None    Complications:  None    Informed Consent:  The procedure, risks, benefits, and alternatives were discussed with the patient. There were no contraindications to the procedure. The patient expressed understanding and agreed to proceed. Fully informed written consent was obtained.     Description of the Procedure:  The patient was taken to the operating room. IV access was obtained prior to the start of the procedure. The patient was positioned prone on the fluoroscopy table. Continuous hemodynamic monitoring was initiated and continued throughout the duration of the procedure. The skin overlying the cervicothoracic spine was prepped with Chloraprep and draped into a sterile field. Fluoroscopy was used to identify the locations of the left side C4, C5, and C6 medial branch nerves. Skin anesthesia was achieved using 0.5 mL of 1% lidocaine over each injection site. A 25-gauge 3.5-inch Quinke spinal needle was slowly inserted and advanced under intermittent fluoroscopic guidance until it made bony contact at the target sites. Proper needle position was confirmed under AP, oblique, and lateral fluoroscopic views. Negative aspiration for blood or CSF was confirmed. Then 0.5 mL of 0.25% bupivacaine was easily injected at each level. There was no pain on injection. The needles were removed intact and bleeding was nil. Sterile bandages were applied. The patient was taken to the recovery room for further observation in stable condition. The patient was then discharged home without any complications.

## 2023-06-14 NOTE — ANESTHESIA POSTPROCEDURE EVALUATION
Anesthesia Post Evaluation    Patient: Elizabeth Bruno    Procedure(s) Performed: Procedure(s) (LRB):  Block-nerve-medial branch-cervical (Bilateral)    Final Anesthesia Type: MAC      Patient location during evaluation: OPS  Patient participation: Yes- Able to Participate  Level of consciousness: awake and alert  Post-procedure vital signs: reviewed and stable  Pain management: adequate  Airway patency: patent    PONV status at discharge: No PONV  Anesthetic complications: no      Cardiovascular status: blood pressure returned to baseline and hemodynamically stable  Respiratory status: unassisted and room air  Hydration status: euvolemic  Follow-up not needed.          Vitals Value Taken Time   /78 06/14/23 1208   Temp 36.5 06/14/23 1208   Pulse 68 06/14/23 1208   Resp 17 06/14/23 1208   SpO2 99 06/14/23 1208         No case tracking events are documented in the log.      Pain/Rene Score: No data recorded

## 2023-06-15 VITALS
SYSTOLIC BLOOD PRESSURE: 138 MMHG | BODY MASS INDEX: 25.72 KG/M2 | OXYGEN SATURATION: 93 % | WEIGHT: 139.75 LBS | HEIGHT: 62 IN | DIASTOLIC BLOOD PRESSURE: 79 MMHG | RESPIRATION RATE: 18 BRPM | HEART RATE: 61 BPM | TEMPERATURE: 98 F

## 2023-06-22 ENCOUNTER — TELEPHONE (OUTPATIENT)
Dept: PAIN MEDICINE | Facility: CLINIC | Age: 78
End: 2023-06-22
Payer: MEDICARE

## 2023-06-27 ENCOUNTER — OFFICE VISIT (OUTPATIENT)
Dept: PAIN MEDICINE | Facility: CLINIC | Age: 78
End: 2023-06-27
Payer: MEDICARE

## 2023-06-27 VITALS
HEART RATE: 73 BPM | WEIGHT: 139 LBS | TEMPERATURE: 98 F | BODY MASS INDEX: 25.58 KG/M2 | DIASTOLIC BLOOD PRESSURE: 80 MMHG | HEIGHT: 62 IN | SYSTOLIC BLOOD PRESSURE: 136 MMHG

## 2023-06-27 DIAGNOSIS — M51.36 LUMBAR DEGENERATIVE DISC DISEASE: ICD-10-CM

## 2023-06-27 DIAGNOSIS — G89.29 CHRONIC BACK PAIN GREATER THAN 3 MONTHS DURATION: ICD-10-CM

## 2023-06-27 DIAGNOSIS — M47.816 LUMBAR SPONDYLOSIS: Primary | ICD-10-CM

## 2023-06-27 DIAGNOSIS — M54.9 CHRONIC BACK PAIN GREATER THAN 3 MONTHS DURATION: ICD-10-CM

## 2023-06-27 PROCEDURE — 3288F PR FALLS RISK ASSESSMENT DOCUMENTED: ICD-10-PCS | Mod: CPTII,,, | Performed by: ANESTHESIOLOGY

## 2023-06-27 PROCEDURE — 1160F PR REVIEW ALL MEDS BY PRESCRIBER/CLIN PHARMACIST DOCUMENTED: ICD-10-PCS | Mod: CPTII,,, | Performed by: ANESTHESIOLOGY

## 2023-06-27 PROCEDURE — 1125F PR PAIN SEVERITY QUANTIFIED, PAIN PRESENT: ICD-10-PCS | Mod: CPTII,,, | Performed by: ANESTHESIOLOGY

## 2023-06-27 PROCEDURE — 3075F PR MOST RECENT SYSTOLIC BLOOD PRESS GE 130-139MM HG: ICD-10-PCS | Mod: CPTII,,, | Performed by: ANESTHESIOLOGY

## 2023-06-27 PROCEDURE — 1101F PT FALLS ASSESS-DOCD LE1/YR: CPT | Mod: CPTII,,, | Performed by: ANESTHESIOLOGY

## 2023-06-27 PROCEDURE — 1159F MED LIST DOCD IN RCRD: CPT | Mod: CPTII,,, | Performed by: ANESTHESIOLOGY

## 2023-06-27 PROCEDURE — 3288F FALL RISK ASSESSMENT DOCD: CPT | Mod: CPTII,,, | Performed by: ANESTHESIOLOGY

## 2023-06-27 PROCEDURE — 99214 PR OFFICE/OUTPT VISIT, EST, LEVL IV, 30-39 MIN: ICD-10-PCS | Mod: ,,, | Performed by: ANESTHESIOLOGY

## 2023-06-27 PROCEDURE — 1159F PR MEDICATION LIST DOCUMENTED IN MEDICAL RECORD: ICD-10-PCS | Mod: CPTII,,, | Performed by: ANESTHESIOLOGY

## 2023-06-27 PROCEDURE — 99214 OFFICE O/P EST MOD 30 MIN: CPT | Mod: ,,, | Performed by: ANESTHESIOLOGY

## 2023-06-27 PROCEDURE — 3079F PR MOST RECENT DIASTOLIC BLOOD PRESSURE 80-89 MM HG: ICD-10-PCS | Mod: CPTII,,, | Performed by: ANESTHESIOLOGY

## 2023-06-27 PROCEDURE — 3079F DIAST BP 80-89 MM HG: CPT | Mod: CPTII,,, | Performed by: ANESTHESIOLOGY

## 2023-06-27 PROCEDURE — 1125F AMNT PAIN NOTED PAIN PRSNT: CPT | Mod: CPTII,,, | Performed by: ANESTHESIOLOGY

## 2023-06-27 PROCEDURE — 1101F PR PT FALLS ASSESS DOC 0-1 FALLS W/OUT INJ PAST YR: ICD-10-PCS | Mod: CPTII,,, | Performed by: ANESTHESIOLOGY

## 2023-06-27 PROCEDURE — 1160F RVW MEDS BY RX/DR IN RCRD: CPT | Mod: CPTII,,, | Performed by: ANESTHESIOLOGY

## 2023-06-27 PROCEDURE — 3075F SYST BP GE 130 - 139MM HG: CPT | Mod: CPTII,,, | Performed by: ANESTHESIOLOGY

## 2023-06-27 NOTE — PROGRESS NOTES
Vicky Perez MD        PATIENT NAME: Elizabeth Bruno  : 1945  DATE: 23  MRN: [id number]      Billing Provider: Vicky Perez MD  Level of Service:   Patient PCP Information       Provider PCP Type    Nette Marroquin MD General            Reason for Visit / Chief Complaint: Neck Pain (Patient following up from procedure. She rates current pain 2/10 and states the procedure has helped so much.  She does complain about the tail bone hurting.)       Update PCP  Update Chief Complaint         History of Present Illness / Problem Focused Workflow     Elizabeth Bruno presents to the clinic with Neck Pain (Patient following up from procedure. She rates current pain 2/10 and states the procedure has helped so much.  She does complain about the tail bone hurting.)     This is a 78-year-old female who returns to clinic today for follow up of her chronic neck pain and occipital neuralgia.  She underwent cervical medial branch blocks a couple weeks ago.  She states that she is getting great relief of her pain since then.  She has improved range of motion in her neck.  She currently rates the pain as 2/10 on the NRS.  She has been taking Aleve as needed.  She is about to leave in a week and a half to go to [place] for 4 months.  She has been having increased pain in the lower back the her tailbone in his requesting to have an injection done before she leaves.      Pain  Associated symptoms include headaches, neck pain, numbness and weakness.   Low-back Pain  Associated symptoms include headaches, leg pain, numbness and weakness.   Back Pain  Associated symptoms include headaches, leg pain, numbness and weakness.   Neck Pain   Associated symptoms include headaches, leg pain, numbness and weakness.   Review of Systems     Review of Systems   Musculoskeletal:  Positive for back pain, gait problem, leg pain and neck pain.   Neurological:  Positive for weakness, numbness and headaches.   All other systems  reviewed and are negative.     Medical / Social / Family History     Past Medical History:   Diagnosis Date    Chronic back pain     GERD (gastroesophageal reflux disease)     HTN (hypertension)     Syringomyelia        Past Surgical History:   Procedure Laterality Date    ANKLE SURGERY Right     bilatteral cataracts removed with lens implants Bilateral     CARPAL TUNNEL RELEASE       SECTION      CHOLECYSTECTOMY      HERNIA REPAIR      HYSTERECTOMY      INJECTION OF ANESTHETIC AGENT AROUND MEDIAL BRANCH NERVES INNERVATING CERVICAL FACET JOINT Bilateral 2023    Procedure: Block-nerve-medial branch-cervical;  Surgeon: Vicky Perez MD;  Location: [facility] OR;  Service: Pain Management;  Laterality: Bilateral;  Medial Branch Block.. C4-C6    INJECTION OF ANESTHETIC AGENT AROUND MEDIAL BRANCH NERVES INNERVATING LUMBAR FACET JOINT Bilateral 2023    Procedure: BLOCK, NERVE, FACET JOINT, LUMBAR, MEDIAL BRANCH Bilateral L3-5;  Surgeon: Vicky Perez MD;  Location: [facility] OR;  Service: Pain Management;  Laterality: Bilateral;  L3-5    INSERTION OF IMPLANTABLE LOOP RECORDER N/A     nonfunctioning    neck fusion      SPINAL CORD STIMULATOR IMPLANT      TONSILLECTOMY      TOTAL KNEE REPLACEMENT USING COMPUTER NAVIGATION Bilateral        Social History  Ms. Bruno  reports that she has never smoked. She has never used smokeless tobacco. She reports that she does not currently use alcohol. She reports that she does not use drugs.    Family History  Ms.'s Bruno family history includes Heart disease in her father and mother.    Medications and Allergies     Medications  Outpatient Medications Marked as Taking for the 23 encounter (Office Visit) with Vicky Perez MD   Medication Sig Dispense Refill    ascorbic acid, vitamin C, (VITAMIN C) 1000 MG tablet Take 1,000 mg by mouth.      aspirin (ECOTRIN) 81 MG EC tablet Take 81 mg by mouth once daily.      B-complex with vitamin C (Z-BEC OR EQUIV)  tablet Take 1 tablet by mouth once daily.      calcium carbonate (OS-CARI) 500 mg calcium (1,250 mg) tablet Take by mouth.      calcium-vitamin D 600 mg-10 mcg (400 unit) Tab Take 1 tablet by mouth once daily.      carboxymethylcellulose (REFRESH PLUS) 0.5 % Dpet Apply 1 drop to eye daily as needed.      cetirizine (ZYRTEC) 10 MG tablet Take 10 mg by mouth once daily.      diclofenac sodium (VOLTAREN) 1 % Gel Apply topically daily as needed.      EScitalopram oxalate (LEXAPRO) 20 MG tablet Take 20 mg by mouth nightly.      famotidine (PEPCID) 40 MG tablet Take 40 mg by mouth nightly.      furosemide (LASIX) 20 MG tablet Take 20 mg by mouth once daily.      gabapentin (NEURONTIN) 600 MG tablet Take 1 tablet (600 mg total) by mouth 3 (three) times daily. 90 tablet 2    glucosamine/chondr rivera A sod (OSTEO BI-FLEX ORAL) Take by mouth.      hyoscyamine (ANASPAZ,LEVSIN) 0.125 mg Tab Take 125 mcg by mouth 2 (two) times daily.      multivitamin with folic acid 400 mcg Tab Take 1 tablet by mouth once daily.      MYRBETRIQ 25 mg Tb24 ER tablet Take 25 mg by mouth once daily.      omega 3-dha-epa-fish oil 1,000 mg (120 mg-180 mg) Cap Take 1 capsule by mouth once daily.      pantoprazole (PROTONIX) 40 MG tablet Take 40 mg by mouth 2 (two) times daily.      traZODone (DESYREL) 50 MG tablet Take 50 mg by mouth nightly.      turmeric root extract 500 mg Cap Take 1 capsule by mouth once daily.      valACYclovir (VALTREX) 500 MG tablet Take 500 mg by mouth once daily.      vitamin E 400 UNIT capsule Take 400 Units by mouth.      zonisamide (ZONEGRAN) 100 MG Cap Take 2 capsules (200 mg total) by mouth nightly. 180 capsule 1       Allergies  Review of patient's allergies indicates:   Allergen Reactions    Tizanidine Shortness Of Breath     Stops breathing    Pollen extracts Other (See Comments)       Physical Examination     Vitals:    06/27/23 1103   BP: 136/80   Pulse: 73   Temp: 98.2 °F (36.8 °C)       Spine Musculoskeletal  Exam    Gait    Gait is normal.    General      Constitutional: appears stated age, well-developed and well-nourished    Scleral icterus: no    Labored breathing: no    Psychiatric: normal mood and affect and no acute distress    Neurological: alert and oriented x3    Skin: intact    Lymphadenopathy: none   CV: extremities warm, well-perfused  Resp: nonlabored breathing    Assessment and Plan (including Health Maintenance)      Problem List  Smart Sets  Document Outside HM   :    Plan:   Lumbar spondylosis    Lumbar degenerative disc disease    Chronic back pain greater than 3 months duration      I am scheduling her for bilateral L5-S1 facet injections today.  The plan was discussed with the patient and she wishes to proceed.    Problem List Items Addressed This Visit       Chronic back pain greater than 3 months duration    Lumbar spondylosis - Primary    Lumbar degenerative disc disease           No future appointments.           There are no Patient Instructions on file for this visit.  No follow-ups on file.     Signature:  Vicky Perez MD      Date of encounter: 6/27/23

## 2023-06-27 NOTE — H&P (VIEW-ONLY)
Vicky Perez MD        PATIENT NAME: Elizabeth Bruno  : 1945  DATE: 23  MRN: [id number]      Billing Provider: Vicky Perez MD  Level of Service:   Patient PCP Information       Provider PCP Type    Nette Marroquin MD General            Reason for Visit / Chief Complaint: Neck Pain (Patient following up from procedure. She rates current pain 2/10 and states the procedure has helped so much.  She does complain about the tail bone hurting.)       Update PCP  Update Chief Complaint         History of Present Illness / Problem Focused Workflow     Elizabeth Bruno presents to the clinic with Neck Pain (Patient following up from procedure. She rates current pain 2/10 and states the procedure has helped so much.  She does complain about the tail bone hurting.)     This is a 78-year-old female who returns to clinic today for follow up of her chronic neck pain and occipital neuralgia.  She underwent cervical medial branch blocks a couple weeks ago.  She states that she is getting great relief of her pain since then.  She has improved range of motion in her neck.  She currently rates the pain as 2/10 on the NRS.  She has been taking Aleve as needed.  She is about to leave in a week and a half to go to [place] for 4 months.  She has been having increased pain in the lower back the her tailbone in his requesting to have an injection done before she leaves.      Pain  Associated symptoms include headaches, neck pain, numbness and weakness.   Low-back Pain  Associated symptoms include headaches, leg pain, numbness and weakness.   Back Pain  Associated symptoms include headaches, leg pain, numbness and weakness.   Neck Pain   Associated symptoms include headaches, leg pain, numbness and weakness.   Review of Systems     Review of Systems   Musculoskeletal:  Positive for back pain, gait problem, leg pain and neck pain.   Neurological:  Positive for weakness, numbness and headaches.   All other systems  reviewed and are negative.     Medical / Social / Family History     Past Medical History:   Diagnosis Date    Chronic back pain     GERD (gastroesophageal reflux disease)     HTN (hypertension)     Syringomyelia        Past Surgical History:   Procedure Laterality Date    ANKLE SURGERY Right     bilatteral cataracts removed with lens implants Bilateral     CARPAL TUNNEL RELEASE       SECTION      CHOLECYSTECTOMY      HERNIA REPAIR      HYSTERECTOMY      INJECTION OF ANESTHETIC AGENT AROUND MEDIAL BRANCH NERVES INNERVATING CERVICAL FACET JOINT Bilateral 2023    Procedure: Block-nerve-medial branch-cervical;  Surgeon: Vicky Perez MD;  Location: [facility] OR;  Service: Pain Management;  Laterality: Bilateral;  Medial Branch Block.. C4-C6    INJECTION OF ANESTHETIC AGENT AROUND MEDIAL BRANCH NERVES INNERVATING LUMBAR FACET JOINT Bilateral 2023    Procedure: BLOCK, NERVE, FACET JOINT, LUMBAR, MEDIAL BRANCH Bilateral L3-5;  Surgeon: Vicky Perez MD;  Location: [facility] OR;  Service: Pain Management;  Laterality: Bilateral;  L3-5    INSERTION OF IMPLANTABLE LOOP RECORDER N/A     nonfunctioning    neck fusion      SPINAL CORD STIMULATOR IMPLANT      TONSILLECTOMY      TOTAL KNEE REPLACEMENT USING COMPUTER NAVIGATION Bilateral        Social History  Ms. Bruno  reports that she has never smoked. She has never used smokeless tobacco. She reports that she does not currently use alcohol. She reports that she does not use drugs.    Family History  Ms.'s Bruno family history includes Heart disease in her father and mother.    Medications and Allergies     Medications  Outpatient Medications Marked as Taking for the 23 encounter (Office Visit) with Vicky Perez MD   Medication Sig Dispense Refill    ascorbic acid, vitamin C, (VITAMIN C) 1000 MG tablet Take 1,000 mg by mouth.      aspirin (ECOTRIN) 81 MG EC tablet Take 81 mg by mouth once daily.      B-complex with vitamin C (Z-BEC OR EQUIV)  tablet Take 1 tablet by mouth once daily.      calcium carbonate (OS-CARI) 500 mg calcium (1,250 mg) tablet Take by mouth.      calcium-vitamin D 600 mg-10 mcg (400 unit) Tab Take 1 tablet by mouth once daily.      carboxymethylcellulose (REFRESH PLUS) 0.5 % Dpet Apply 1 drop to eye daily as needed.      cetirizine (ZYRTEC) 10 MG tablet Take 10 mg by mouth once daily.      diclofenac sodium (VOLTAREN) 1 % Gel Apply topically daily as needed.      EScitalopram oxalate (LEXAPRO) 20 MG tablet Take 20 mg by mouth nightly.      famotidine (PEPCID) 40 MG tablet Take 40 mg by mouth nightly.      furosemide (LASIX) 20 MG tablet Take 20 mg by mouth once daily.      gabapentin (NEURONTIN) 600 MG tablet Take 1 tablet (600 mg total) by mouth 3 (three) times daily. 90 tablet 2    glucosamine/chondr rivera A sod (OSTEO BI-FLEX ORAL) Take by mouth.      hyoscyamine (ANASPAZ,LEVSIN) 0.125 mg Tab Take 125 mcg by mouth 2 (two) times daily.      multivitamin with folic acid 400 mcg Tab Take 1 tablet by mouth once daily.      MYRBETRIQ 25 mg Tb24 ER tablet Take 25 mg by mouth once daily.      omega 3-dha-epa-fish oil 1,000 mg (120 mg-180 mg) Cap Take 1 capsule by mouth once daily.      pantoprazole (PROTONIX) 40 MG tablet Take 40 mg by mouth 2 (two) times daily.      traZODone (DESYREL) 50 MG tablet Take 50 mg by mouth nightly.      turmeric root extract 500 mg Cap Take 1 capsule by mouth once daily.      valACYclovir (VALTREX) 500 MG tablet Take 500 mg by mouth once daily.      vitamin E 400 UNIT capsule Take 400 Units by mouth.      zonisamide (ZONEGRAN) 100 MG Cap Take 2 capsules (200 mg total) by mouth nightly. 180 capsule 1       Allergies  Review of patient's allergies indicates:   Allergen Reactions    Tizanidine Shortness Of Breath     Stops breathing    Pollen extracts Other (See Comments)       Physical Examination     Vitals:    06/27/23 1103   BP: 136/80   Pulse: 73   Temp: 98.2 °F (36.8 °C)       Spine Musculoskeletal  Exam    Gait    Gait is normal.    General      Constitutional: appears stated age, well-developed and well-nourished    Scleral icterus: no    Labored breathing: no    Psychiatric: normal mood and affect and no acute distress    Neurological: alert and oriented x3    Skin: intact    Lymphadenopathy: none   CV: extremities warm, well-perfused  Resp: nonlabored breathing    Assessment and Plan (including Health Maintenance)      Problem List  Smart Sets  Document Outside HM   :    Plan:   Lumbar spondylosis    Lumbar degenerative disc disease    Chronic back pain greater than 3 months duration      I am scheduling her for bilateral L5-S1 facet injections today.  The plan was discussed with the patient and she wishes to proceed.    Problem List Items Addressed This Visit       Chronic back pain greater than 3 months duration    Lumbar spondylosis - Primary    Lumbar degenerative disc disease           No future appointments.           There are no Patient Instructions on file for this visit.  No follow-ups on file.     Signature:  Vicky Perez MD      Date of encounter: 6/27/23

## 2023-07-04 ENCOUNTER — ANESTHESIA EVENT (OUTPATIENT)
Dept: SURGERY | Facility: HOSPITAL | Age: 78
End: 2023-07-04
Payer: MEDICARE

## 2023-07-04 NOTE — ANESTHESIA PREPROCEDURE EVALUATION
2023  Elizabeth Bruno is a 78 y.o., female with ----------------------------  Arthritis  Chronic back pain  GERD (gastroesophageal reflux disease)  Heart murmur  Herpes  HTN (hypertension)  Insomnia  OAB (overactive bladder)  Seasonal allergies  Syringomyelia    And ----------------------------  Ankle surgery  Bilatteral cataracts removed with lens implants  Carpal tunnel release   section  Cholecystectomy  Hernia repair  Hysterectomy  Injection of anesthetic agent around medial branch nerves innervating   cervical facet joint      Comment:  Procedure: Block-nerve-medial branch-cervical;  Surgeon:               Vicky Perez MD;  Location: [facility];  Service: Pain                Management;  Laterality: Bilateral;  Medial Branch                Block.. C4-C6  Injection of anesthetic agent around medial branch nerves innervating   lumbar facet joint      Comment:  Procedure: BLOCK, NERVE, FACET JOINT, LUMBAR, MEDIAL                BRANCH Bilateral L3-5;  Surgeon: Vicyk Perez MD;                 Location: [facility] OR;  Service: Pain Management;                 Laterality: Bilateral;  L3-5  Insertion of implantable loop recorder      Comment:  nonfunctioning  Neck fusion  Spinal cord stimulator implant  Tonsillectomy  Total knee replacement using computer navigation    Presents for bilateral medial branch blocks.      Pre-op Assessment    I have reviewed the NPO Status.      Review of Systems      Physical Exam  General: Well nourished, Cooperative, Alert and Oriented    Airway:  Mallampati: II   Mouth Opening: Normal  TM Distance: Normal  Tongue: Normal  Neck ROM: Normal ROM    Dental:  Intact    Chest/Lungs:  Clear to auscultation, Normal Respiratory Rate    Heart:  Rate: Normal  Rhythm: Regular Rhythm        Anesthesia Plan  Type of Anesthesia, risks & benefits discussed:    Anesthesia  Type: Gen Natural Airway  Intra-op Monitoring Plan: Standard ASA Monitors  Post Op Pain Control Plan: IV/PO Opioids PRN  Induction:  IV  Airway Plan: Direct  Informed Consent: Informed consent signed with the Patient and all parties understand the risks and agree with anesthesia plan.  All questions answered. Patient consented to blood products? No  ASA Score: 3  Day of Surgery Review of History & Physical: H&P Update referred to the surgeon/provider.  Anesthesia Plan Notes: Nasal cannula vs facemask supplemental oxygenation   For patients with SHASHANK/obesity, may consider SuperNoval Nasal CPAP      Ready For Surgery From Anesthesia Perspective.     .

## 2023-07-05 ENCOUNTER — HOSPITAL ENCOUNTER (OUTPATIENT)
Facility: HOSPITAL | Age: 78
Discharge: HOME | End: 2023-07-05
Attending: ANESTHESIOLOGY | Admitting: ANESTHESIOLOGY
Payer: MEDICARE

## 2023-07-05 ENCOUNTER — ANESTHESIA (OUTPATIENT)
Dept: SURGERY | Facility: HOSPITAL | Age: 78
End: 2023-07-05
Payer: MEDICARE

## 2023-07-05 VITALS
HEART RATE: 58 BPM | TEMPERATURE: 98 F | WEIGHT: 141.31 LBS | BODY MASS INDEX: 26.01 KG/M2 | OXYGEN SATURATION: 95 % | SYSTOLIC BLOOD PRESSURE: 163 MMHG | DIASTOLIC BLOOD PRESSURE: 80 MMHG | HEIGHT: 62 IN | RESPIRATION RATE: 18 BRPM

## 2023-07-05 DIAGNOSIS — G89.29 CHRONIC BACK PAIN GREATER THAN 3 MONTHS DURATION: ICD-10-CM

## 2023-07-05 DIAGNOSIS — M54.9 CHRONIC BACK PAIN GREATER THAN 3 MONTHS DURATION: ICD-10-CM

## 2023-07-05 PROCEDURE — 64493 PR INJ DX/THER AGNT PARAVERT FACET JOINT,IMG GUIDE,LUMBAR/SAC,1ST LVL: ICD-10-PCS | Mod: 50,,, | Performed by: ANESTHESIOLOGY

## 2023-07-05 PROCEDURE — 63600175 PHARM REV CODE 636 W HCPCS: Performed by: NURSE ANESTHETIST, CERTIFIED REGISTERED

## 2023-07-05 PROCEDURE — 64493 INJ PARAVERT F JNT L/S 1 LEV: CPT | Mod: 50,,, | Performed by: ANESTHESIOLOGY

## 2023-07-05 PROCEDURE — 63600175 PHARM REV CODE 636 W HCPCS: Performed by: ANESTHESIOLOGY

## 2023-07-05 PROCEDURE — 37000008 HC ANESTHESIA 1ST 15 MINUTES: Performed by: ANESTHESIOLOGY

## 2023-07-05 PROCEDURE — 64636 DESTROY L/S FACET JNT ADDL: CPT | Performed by: ANESTHESIOLOGY

## 2023-07-05 PROCEDURE — D9220A PRA ANESTHESIA: ICD-10-PCS | Mod: 23,,, | Performed by: NURSE ANESTHETIST, CERTIFIED REGISTERED

## 2023-07-05 PROCEDURE — D9220A PRA ANESTHESIA: Mod: 23,,, | Performed by: NURSE ANESTHETIST, CERTIFIED REGISTERED

## 2023-07-05 PROCEDURE — 25000003 PHARM REV CODE 250: Performed by: NURSE ANESTHETIST, CERTIFIED REGISTERED

## 2023-07-05 PROCEDURE — 25000003 PHARM REV CODE 250: Performed by: ANESTHESIOLOGY

## 2023-07-05 PROCEDURE — 64635 DESTROY LUMB/SAC FACET JNT: CPT | Performed by: ANESTHESIOLOGY

## 2023-07-05 RX ORDER — LIDOCAINE HYDROCHLORIDE 10 MG/ML
INJECTION, SOLUTION EPIDURAL; INFILTRATION; INTRACAUDAL; PERINEURAL
Status: DISCONTINUED
Start: 2023-07-05 | End: 2023-07-05 | Stop reason: HOSPADM

## 2023-07-05 RX ORDER — LIDOCAINE HYDROCHLORIDE 10 MG/ML
INJECTION, SOLUTION EPIDURAL; INFILTRATION; INTRACAUDAL; PERINEURAL
Status: DISCONTINUED | OUTPATIENT
Start: 2023-07-05 | End: 2023-07-05

## 2023-07-05 RX ORDER — BUPIVACAINE HYDROCHLORIDE 2.5 MG/ML
INJECTION, SOLUTION EPIDURAL; INFILTRATION; INTRACAUDAL
Status: DISCONTINUED | OUTPATIENT
Start: 2023-07-05 | End: 2023-07-05 | Stop reason: HOSPADM

## 2023-07-05 RX ORDER — SODIUM CHLORIDE, SODIUM GLUCONATE, SODIUM ACETATE, POTASSIUM CHLORIDE AND MAGNESIUM CHLORIDE 30; 37; 368; 526; 502 MG/100ML; MG/100ML; MG/100ML; MG/100ML; MG/100ML
INJECTION, SOLUTION INTRAVENOUS CONTINUOUS
Status: ACTIVE | OUTPATIENT
Start: 2023-07-05 | End: 2023-08-04

## 2023-07-05 RX ORDER — SODIUM CHLORIDE, SODIUM LACTATE, POTASSIUM CHLORIDE, CALCIUM CHLORIDE 600; 310; 30; 20 MG/100ML; MG/100ML; MG/100ML; MG/100ML
INJECTION, SOLUTION INTRAVENOUS CONTINUOUS
Status: DISCONTINUED | OUTPATIENT
Start: 2023-07-05 | End: 2024-01-11

## 2023-07-05 RX ORDER — PROPOFOL 10 MG/ML
VIAL (ML) INTRAVENOUS
Status: DISCONTINUED | OUTPATIENT
Start: 2023-07-05 | End: 2023-07-05

## 2023-07-05 RX ORDER — TRIAMCINOLONE ACETONIDE 40 MG/ML
INJECTION, SUSPENSION INTRA-ARTICULAR; INTRAMUSCULAR
Status: DISCONTINUED
Start: 2023-07-05 | End: 2023-07-05 | Stop reason: HOSPADM

## 2023-07-05 RX ORDER — TRIAMCINOLONE ACETONIDE 40 MG/ML
INJECTION, SUSPENSION INTRA-ARTICULAR; INTRAMUSCULAR
Status: DISCONTINUED | OUTPATIENT
Start: 2023-07-05 | End: 2023-07-05 | Stop reason: HOSPADM

## 2023-07-05 RX ORDER — LIDOCAINE HYDROCHLORIDE 10 MG/ML
INJECTION INFILTRATION; PERINEURAL
Status: DISCONTINUED | OUTPATIENT
Start: 2023-07-05 | End: 2023-07-05 | Stop reason: HOSPADM

## 2023-07-05 RX ORDER — BUPIVACAINE HYDROCHLORIDE 2.5 MG/ML
INJECTION, SOLUTION EPIDURAL; INFILTRATION; INTRACAUDAL
Status: DISCONTINUED
Start: 2023-07-05 | End: 2023-07-05 | Stop reason: HOSPADM

## 2023-07-05 RX ADMIN — LIDOCAINE HYDROCHLORIDE 5 ML: 10 INJECTION, SOLUTION EPIDURAL; INFILTRATION; INTRACAUDAL; PERINEURAL at 12:07

## 2023-07-05 RX ADMIN — PROPOFOL 20 MG: 10 INJECTION, EMULSION INTRAVENOUS at 12:07

## 2023-07-05 RX ADMIN — PROPOFOL 30 MG: 10 INJECTION, EMULSION INTRAVENOUS at 12:07

## 2023-07-05 RX ADMIN — PROPOFOL 50 MG: 10 INJECTION, EMULSION INTRAVENOUS at 12:07

## 2023-07-05 NOTE — OP NOTE
Bilateral L5-S1 Facet Injections    Pre-Procedure Diagnoses:  1. Chronic pain syndrome  2. Chronic Low back pain  3. Lumbar facet arthropathy    Post-Procedure Diagnoses:  1. Chronic pain syndrome  2. Chronic Low back pain  3. Lumbar facet arthropathy    Anesthesia:  Local and MAC    Estimated Blood Loss:  None    Complications:  None    Informed Consent:  The procedure, risks, benefits, and alternatives were discussed with the patient. There were no contraindications to the procedure. The patient expressed understanding and agreed to proceed. Fully informed written consent was obtained.     Description of the Procedure:  The patient was taken to the operating room. IV access was obtained prior to the start of the procedure. The patient was positioned prone on the fluoroscopy table. Continuous hemodynamic monitoring was initiated and continued throughout the duration of the procedure. The skin overlying the lumbosacral spine was prepped with Chloraprep and draped into a sterile field. Fluoroscopy was used to identify the location of the left L5-S1 facet joint. Skin anesthesia was achieved using 0.5 mL of 1% lidocaine over the injection site. A 22-gauge 3.5 inch Quinke spinal needle was slowly inserted and advanced under intermittent fluoroscopic guidance until it entered the joint capsule. Negative aspiration for blood or CSF was confirmed. Then a combination of 10 mg of Kenalog and 0.5 mL of 0.25% bupivacaine was easily injected. There was no pain on injection. The needle was removed intact and bleeding was nil. The same procedure was repeated in identical fashion on the right side at L5-S1. Sterile bandages were applied. The patient was taken to the recovery room for further observation in stable condition. The patient was then discharged home without any complications.

## 2023-07-05 NOTE — DISCHARGE SUMMARY
[facility] - Periop Services  Discharge Note  Short Stay    Procedure(s) (LRB):  Block-nerve-medial branch-lumbar (Bilateral)      OUTCOME: Patient tolerated treatment/procedure well without complication and is now ready for discharge.    DISPOSITION: Home or Self Care    FINAL DIAGNOSIS:  <principal problem not specified>    FOLLOWUP: In clinic    DISCHARGE INSTRUCTIONS:  No discharge procedures on file.     TIME SPENT ON DISCHARGE: 5 minutes

## 2023-07-05 NOTE — ANESTHESIA POSTPROCEDURE EVALUATION
Anesthesia Post Evaluation    Patient: Elizabeth Bruno    Procedure(s) Performed: Procedure(s) (LRB):  Block-nerve-medial branch-lumbar (Bilateral)    Final Anesthesia Type: MAC      Patient location during evaluation: OPS  Patient participation: Yes- Able to Participate  Level of consciousness: awake and alert and oriented  Post-procedure vital signs: reviewed and stable  Pain management: adequate  Airway patency: patent  SHASHANK mitigation strategies: Multimodal analgesia  PONV status at discharge: No PONV  Anesthetic complications: no      Cardiovascular status: hemodynamically stable  Respiratory status: spontaneous ventilation and room air  Hydration status: euvolemic  Follow-up not needed.          Vitals Value Taken Time   /66 07/05/23 1225   Temp 36.5 07/05/23 1225   Pulse 88 07/05/23 1225   Resp 18 07/05/23 1225   SpO2 100 07/05/23 1225         No case tracking events are documented in the log.      Pain/Rene Score: No data recorded

## 2023-07-05 NOTE — PLAN OF CARE
Pt ready for discharge. Tolerated procedure well  Problem: Adult Inpatient Plan of Care  Goal: Plan of Care Review  Outcome: Met  Goal: Patient-Specific Goal (Individualized)  Outcome: Met  Goal: Absence of Hospital-Acquired Illness or Injury  Outcome: Met  Goal: Optimal Comfort and Wellbeing  Outcome: Met  Goal: Readiness for Transition of Care  Outcome: Met

## 2023-07-05 NOTE — TRANSFER OF CARE
"Anesthesia Transfer of Care Note    Patient: Elizabeth Bruno    Procedure(s) Performed: Procedure(s) (LRB):  Block-nerve-medial branch-lumbar (Bilateral)    Patient location: OPS    Anesthesia Type: MAC    Transport from OR: Transported from OR on room air with adequate spontaneous ventilation    Post pain: adequate analgesia    Post assessment: no apparent anesthetic complications    Post vital signs: stable    Level of consciousness: awake, alert and oriented    Nausea/Vomiting: no nausea/vomiting    Complications: none    Transfer of care protocol was followed      Last vitals:   Visit Vitals  BP (!) 155/66 (BP Location: Left arm, Patient Position: Lying)   Pulse 66   Temp 36.5 °C (97.7 °F) (Skin)   Resp 18   Ht 5' 2" (1.575 m)   Wt 64.1 kg (141 lb 5 oz)   SpO2 100%   Breastfeeding No   BMI 25.85 kg/m²     "

## 2023-11-17 ENCOUNTER — OFFICE VISIT (OUTPATIENT)
Dept: PAIN MEDICINE | Facility: CLINIC | Age: 78
End: 2023-11-17
Payer: MEDICARE

## 2023-11-17 VITALS
BODY MASS INDEX: 25.95 KG/M2 | HEART RATE: 77 BPM | DIASTOLIC BLOOD PRESSURE: 97 MMHG | HEIGHT: 62 IN | SYSTOLIC BLOOD PRESSURE: 169 MMHG | TEMPERATURE: 99 F | WEIGHT: 141 LBS

## 2023-11-17 DIAGNOSIS — M47.816 LUMBAR SPONDYLOSIS: Primary | ICD-10-CM

## 2023-11-17 DIAGNOSIS — G89.29 CHRONIC BACK PAIN GREATER THAN 3 MONTHS DURATION: ICD-10-CM

## 2023-11-17 DIAGNOSIS — M54.9 CHRONIC BACK PAIN GREATER THAN 3 MONTHS DURATION: ICD-10-CM

## 2023-11-17 DIAGNOSIS — M53.3 COCCYDYNIA: ICD-10-CM

## 2023-11-17 DIAGNOSIS — M51.36 LUMBAR DEGENERATIVE DISC DISEASE: ICD-10-CM

## 2023-11-17 PROCEDURE — 99214 PR OFFICE/OUTPT VISIT, EST, LEVL IV, 30-39 MIN: ICD-10-PCS | Mod: ,,, | Performed by: ANESTHESIOLOGY

## 2023-11-17 PROCEDURE — 3080F PR MOST RECENT DIASTOLIC BLOOD PRESSURE >= 90 MM HG: ICD-10-PCS | Mod: CPTII,,, | Performed by: ANESTHESIOLOGY

## 2023-11-17 PROCEDURE — 3080F DIAST BP >= 90 MM HG: CPT | Mod: CPTII,,, | Performed by: ANESTHESIOLOGY

## 2023-11-17 PROCEDURE — 3288F PR FALLS RISK ASSESSMENT DOCUMENTED: ICD-10-PCS | Mod: CPTII,,, | Performed by: ANESTHESIOLOGY

## 2023-11-17 PROCEDURE — 1125F AMNT PAIN NOTED PAIN PRSNT: CPT | Mod: CPTII,,, | Performed by: ANESTHESIOLOGY

## 2023-11-17 PROCEDURE — 1125F PR PAIN SEVERITY QUANTIFIED, PAIN PRESENT: ICD-10-PCS | Mod: CPTII,,, | Performed by: ANESTHESIOLOGY

## 2023-11-17 PROCEDURE — 3288F FALL RISK ASSESSMENT DOCD: CPT | Mod: CPTII,,, | Performed by: ANESTHESIOLOGY

## 2023-11-17 PROCEDURE — 1159F MED LIST DOCD IN RCRD: CPT | Mod: CPTII,,, | Performed by: ANESTHESIOLOGY

## 2023-11-17 PROCEDURE — 1159F PR MEDICATION LIST DOCUMENTED IN MEDICAL RECORD: ICD-10-PCS | Mod: CPTII,,, | Performed by: ANESTHESIOLOGY

## 2023-11-17 PROCEDURE — 1160F RVW MEDS BY RX/DR IN RCRD: CPT | Mod: CPTII,,, | Performed by: ANESTHESIOLOGY

## 2023-11-17 PROCEDURE — 1101F PT FALLS ASSESS-DOCD LE1/YR: CPT | Mod: CPTII,,, | Performed by: ANESTHESIOLOGY

## 2023-11-17 PROCEDURE — 99214 OFFICE O/P EST MOD 30 MIN: CPT | Mod: ,,, | Performed by: ANESTHESIOLOGY

## 2023-11-17 PROCEDURE — 1160F PR REVIEW ALL MEDS BY PRESCRIBER/CLIN PHARMACIST DOCUMENTED: ICD-10-PCS | Mod: CPTII,,, | Performed by: ANESTHESIOLOGY

## 2023-11-17 PROCEDURE — 1101F PR PT FALLS ASSESS DOC 0-1 FALLS W/OUT INJ PAST YR: ICD-10-PCS | Mod: CPTII,,, | Performed by: ANESTHESIOLOGY

## 2023-11-17 PROCEDURE — 3077F PR MOST RECENT SYSTOLIC BLOOD PRESSURE >= 140 MM HG: ICD-10-PCS | Mod: CPTII,,, | Performed by: ANESTHESIOLOGY

## 2023-11-17 PROCEDURE — 3077F SYST BP >= 140 MM HG: CPT | Mod: CPTII,,, | Performed by: ANESTHESIOLOGY

## 2023-11-17 RX ORDER — HYDROCODONE BITARTRATE AND ACETAMINOPHEN 5; 325 MG/1; MG/1
1 TABLET ORAL EVERY 8 HOURS PRN
COMMUNITY
Start: 2023-10-06

## 2023-11-17 NOTE — PROGRESS NOTES
Vicky Perez MD        PATIENT NAME: Elizabeth Bruno  : 1945  DATE: 23  MRN: [id number]      Billing Provider: Vicky Perez MD  Level of Service:   Patient PCP Information       Provider PCP Type    Nette Marroquin MD General            Reason for Visit / Chief Complaint: Low-back Pain (F/u today for increased back pain, pt states she has been wearing lidocaine patches, would like to discuss P.T & massage options, RX medication for relief, pain level 6/10)       Update PCP  Update Chief Complaint         History of Present Illness / Problem Focused Workflow     Elizabeth Bruno presents to the clinic with Low-back Pain (F/u today for increased back pain, pt states she has been wearing lidocaine patches, would like to discuss P.T & massage options, RX medication for relief, pain level 6/10)     This is a 78-year-old female who returns to clinic today for follow up of her chronic low back pain.  She was last seen here back in July, at which time she underwent bilateral L5-S1 facet injections.  She did get some relief from his injections.  She has been in [place] for the past 4 months.  She reports increasing pain in her tailbone, and is wanting to undergo an injection to help with this.  She did see a physician while in [place], who prescribed her some lidocaine patches.        Pain  Associated symptoms include headaches, neck pain, numbness and weakness.   Low-back Pain  Associated symptoms include headaches, leg pain, numbness and weakness.   Back Pain  Associated symptoms include headaches, leg pain, numbness and weakness.   Neck Pain   Associated symptoms include headaches, leg pain, numbness and weakness.     Review of Systems     Review of Systems   Musculoskeletal:  Positive for back pain, gait problem, leg pain and neck pain.   Neurological:  Positive for weakness, numbness and headaches.   All other systems reviewed and are negative.       Medical / Social / Family History     Past  Medical History:   Diagnosis Date    Arthritis     Chronic back pain     GERD (gastroesophageal reflux disease)     Heart murmur     Herpes     HTN (hypertension)     Insomnia     OAB (overactive bladder)     Seasonal allergies     Syringomyelia        Past Surgical History:   Procedure Laterality Date    ANKLE SURGERY Right     bilatteral cataracts removed with lens implants Bilateral     CARPAL TUNNEL RELEASE       SECTION      CHOLECYSTECTOMY      HERNIA REPAIR      HYSTERECTOMY      INJECTION OF ANESTHETIC AGENT AROUND MEDIAL BRANCH NERVES INNERVATING CERVICAL FACET JOINT Bilateral 2023    Procedure: Block-nerve-medial branch-cervical;  Surgeon: Vicky Perez MD;  Location: [facility] OR;  Service: Pain Management;  Laterality: Bilateral;  Medial Branch Block.. C4-C6    INJECTION OF ANESTHETIC AGENT AROUND MEDIAL BRANCH NERVES INNERVATING LUMBAR FACET JOINT Bilateral 2023    Procedure: BLOCK, NERVE, FACET JOINT, LUMBAR, MEDIAL BRANCH Bilateral L3-5;  Surgeon: Vicky Perez MD;  Location: [facility] OR;  Service: Pain Management;  Laterality: Bilateral;  L3-5    INJECTION OF ANESTHETIC AGENT AROUND MEDIAL BRANCH NERVES INNERVATING LUMBAR FACET JOINT Bilateral 2023    Procedure: Block-nerve-medial branch-lumbar;  Surgeon: Vicky Perez MD;  Location: [facility] OR;  Service: Pain Management;  Laterality: Bilateral;  Facet Joint Injection, Bilateral Lumbar L5-S1    INSERTION OF IMPLANTABLE LOOP RECORDER N/A     nonfunctioning    neck fusion      SPINAL CORD STIMULATOR IMPLANT      TONSILLECTOMY      TOTAL KNEE REPLACEMENT USING COMPUTER NAVIGATION Bilateral        Social History  Ms. Bruno  reports that she has never smoked. She has never used smokeless tobacco. She reports that she does not currently use alcohol. She reports that she does not use drugs.    Family History  Ms.'s Bruno family history includes Heart disease in her father and mother.    Medications and Allergies      Medications  Outpatient Medications Marked as Taking for the 11/17/23 encounter (Office Visit) with Vicky Perez MD   Medication Sig Dispense Refill    ascorbic acid, vitamin C, (VITAMIN C) 1000 MG tablet Take 1,000 mg by mouth once daily.      aspirin (ECOTRIN) 81 MG EC tablet Take 81 mg by mouth once daily.      B-complex with vitamin C (Z-BEC OR EQUIV) tablet Take 1 tablet by mouth once daily.      calcium carbonate (OS-CARI) 500 mg calcium (1,250 mg) tablet Take by mouth.      calcium-vitamin D 600 mg-10 mcg (400 unit) Tab Take 1 tablet by mouth once daily.      carboxymethylcellulose (REFRESH PLUS) 0.5 % Dpet Apply 1 drop to eye daily as needed.      cetirizine (ZYRTEC) 10 MG tablet Take 10 mg by mouth once daily.      diclofenac sodium (VOLTAREN) 1 % Gel Apply topically daily as needed.      EScitalopram oxalate (LEXAPRO) 20 MG tablet Take 20 mg by mouth nightly.      famotidine (PEPCID) 40 MG tablet Take 40 mg by mouth nightly.      furosemide (LASIX) 20 MG tablet Take 20 mg by mouth once daily.      gabapentin (NEURONTIN) 600 MG tablet Take 1 tablet (600 mg total) by mouth 3 (three) times daily. 90 tablet 2    glucosamine/chondr rivera A sod (OSTEO BI-FLEX ORAL) Take 1 tablet by mouth Daily.      HYDROcodone-acetaminophen (NORCO) 5-325 mg per tablet Take 1 tablet by mouth every 8 (eight) hours as needed.      hyoscyamine (ANASPAZ,LEVSIN) 0.125 mg Tab Take 125 mcg by mouth 2 (two) times daily.      midodrine (PROAMATINE) 2.5 MG Tab Take 2.5 mg by mouth 2 (two) times daily.      multivitamin with folic acid 400 mcg Tab Take 1 tablet by mouth once daily.      MYRBETRIQ 25 mg Tb24 ER tablet Take 25 mg by mouth once daily.      omega 3-dha-epa-fish oil 1,000 mg (120 mg-180 mg) Cap Take 1 capsule by mouth once daily.      pantoprazole (PROTONIX) 40 MG tablet Take 40 mg by mouth 2 (two) times daily.      traZODone (DESYREL) 50 MG tablet Take 50 mg by mouth nightly.      turmeric root extract 500 mg Cap Take 1  capsule by mouth once daily.      valACYclovir (VALTREX) 500 MG tablet Take 500 mg by mouth once daily.      vitamin E 400 UNIT capsule Take 400 Units by mouth once daily.      zonisamide (ZONEGRAN) 100 MG Cap Take 2 capsules (200 mg total) by mouth nightly. 180 capsule 1       Allergies  Review of patient's allergies indicates:   Allergen Reactions    Tizanidine Shortness Of Breath     Stops breathing    Pollen extracts Other (See Comments)       Physical Examination     Vitals:    11/17/23 0943   BP: (!) 169/97   Pulse: 77   Temp: 98.5 °F (36.9 °C)       Spine Musculoskeletal Exam    Gait    Gait is normal.    General      Constitutional: appears stated age, well-developed and well-nourished    Scleral icterus: no    Labored breathing: no    Psychiatric: normal mood and affect and no acute distress    Neurological: alert and oriented x3    Skin: intact    Lymphadenopathy: none     CV: extremities warm, well-perfused  Resp: nonlabored breathing    Assessment and Plan (including Health Maintenance)      Problem List  Smart Sets  Document Outside HM   :    Plan:   Lumbar spondylosis    Lumbar degenerative disc disease    Chronic back pain greater than 3 months duration      She is being scheduled for a coccygeal ligament injection today to help with her coccydynia down.  She is also requesting to have occipital nerve blocks down when she comes back for her follow-up appointment.    Problem List Items Addressed This Visit       Chronic back pain greater than 3 months duration    Lumbar spondylosis - Primary    Lumbar degenerative disc disease           No future appointments.           There are no Patient Instructions on file for this visit.  No follow-ups on file.     Signature:  Vicky Perez MD      Date of encounter: 11/17/23

## 2023-12-12 ENCOUNTER — OFFICE VISIT (OUTPATIENT)
Dept: PAIN MEDICINE | Facility: CLINIC | Age: 78
End: 2023-12-12
Payer: MEDICARE

## 2023-12-12 VITALS
HEIGHT: 62 IN | DIASTOLIC BLOOD PRESSURE: 104 MMHG | BODY MASS INDEX: 23.92 KG/M2 | SYSTOLIC BLOOD PRESSURE: 158 MMHG | TEMPERATURE: 98 F | HEART RATE: 65 BPM | WEIGHT: 130 LBS

## 2023-12-12 DIAGNOSIS — M51.36 DDD (DEGENERATIVE DISC DISEASE), LUMBAR: ICD-10-CM

## 2023-12-12 DIAGNOSIS — M54.16 LUMBAR RADICULITIS: ICD-10-CM

## 2023-12-12 DIAGNOSIS — M53.3 COCCYDYNIA: Primary | ICD-10-CM

## 2023-12-12 PROCEDURE — 3080F PR MOST RECENT DIASTOLIC BLOOD PRESSURE >= 90 MM HG: ICD-10-PCS | Mod: CPTII,,, | Performed by: NURSE PRACTITIONER

## 2023-12-12 PROCEDURE — 1125F PR PAIN SEVERITY QUANTIFIED, PAIN PRESENT: ICD-10-PCS | Mod: CPTII,,, | Performed by: NURSE PRACTITIONER

## 2023-12-12 PROCEDURE — 1125F AMNT PAIN NOTED PAIN PRSNT: CPT | Mod: CPTII,,, | Performed by: NURSE PRACTITIONER

## 2023-12-12 PROCEDURE — 1101F PR PT FALLS ASSESS DOC 0-1 FALLS W/OUT INJ PAST YR: ICD-10-PCS | Mod: CPTII,,, | Performed by: NURSE PRACTITIONER

## 2023-12-12 PROCEDURE — 99213 OFFICE O/P EST LOW 20 MIN: CPT | Mod: ,,, | Performed by: NURSE PRACTITIONER

## 2023-12-12 PROCEDURE — 3288F PR FALLS RISK ASSESSMENT DOCUMENTED: ICD-10-PCS | Mod: CPTII,,, | Performed by: NURSE PRACTITIONER

## 2023-12-12 PROCEDURE — 1159F PR MEDICATION LIST DOCUMENTED IN MEDICAL RECORD: ICD-10-PCS | Mod: CPTII,,, | Performed by: NURSE PRACTITIONER

## 2023-12-12 PROCEDURE — 3077F SYST BP >= 140 MM HG: CPT | Mod: CPTII,,, | Performed by: NURSE PRACTITIONER

## 2023-12-12 PROCEDURE — 99213 PR OFFICE/OUTPT VISIT, EST, LEVL III, 20-29 MIN: ICD-10-PCS | Mod: ,,, | Performed by: NURSE PRACTITIONER

## 2023-12-12 PROCEDURE — 3077F PR MOST RECENT SYSTOLIC BLOOD PRESSURE >= 140 MM HG: ICD-10-PCS | Mod: CPTII,,, | Performed by: NURSE PRACTITIONER

## 2023-12-12 PROCEDURE — 1159F MED LIST DOCD IN RCRD: CPT | Mod: CPTII,,, | Performed by: NURSE PRACTITIONER

## 2023-12-12 PROCEDURE — 1101F PT FALLS ASSESS-DOCD LE1/YR: CPT | Mod: CPTII,,, | Performed by: NURSE PRACTITIONER

## 2023-12-12 PROCEDURE — 3288F FALL RISK ASSESSMENT DOCD: CPT | Mod: CPTII,,, | Performed by: NURSE PRACTITIONER

## 2023-12-12 PROCEDURE — 3080F DIAST BP >= 90 MM HG: CPT | Mod: CPTII,,, | Performed by: NURSE PRACTITIONER

## 2023-12-12 RX ORDER — METHOCARBAMOL 500 MG/1
500 TABLET, FILM COATED ORAL
COMMUNITY
Start: 2023-09-28

## 2023-12-12 RX ORDER — LIDOCAINE 50 MG/G
PATCH TOPICAL
COMMUNITY
Start: 2023-10-11 | End: 2024-01-08 | Stop reason: SDUPTHER

## 2023-12-12 RX ORDER — ROSUVASTATIN CALCIUM 5 MG/1
5 TABLET, COATED ORAL NIGHTLY
COMMUNITY
Start: 2023-11-28 | End: 2024-05-26

## 2023-12-12 RX ORDER — DICLOFENAC SODIUM 50 MG/1
TABLET, DELAYED RELEASE ORAL
COMMUNITY
Start: 2023-09-28 | End: 2024-02-28 | Stop reason: ALTCHOICE

## 2023-12-12 NOTE — PATIENT INSTRUCTIONS
hold Voltaren gel, aspirin 81 mg, Omega 3/fish oil and multivitamin 7 days prior to procedure and resume medications postop.

## 2023-12-12 NOTE — H&P (VIEW-ONLY)
ADMISSION HISTORY & PHYSICAL    SUBJECTIVE:    CHIEF COMPLAINT: Low-back Pain (Pre op 23 C/O pain level 5, Taking pain meds  and using pain patches PRN.)       History of Present Illness: 78 y.o. female presents today for preoperative evaluation for coccygeal ligament injection on 2023. I reviewed the indications for procedure. The risks and benefits of the proposed and alternative treatments were discussed with the patient. Questions pertinent to the procedure were solicited and answered. No assurances were given. Informed consent was obtained. The patient expressed good understanding and wished to proceed with scheduling the procedure.     Review of Systems:   Constitutional: No fever, weakness, or fatigue.   Ear/Nose/Mouth/Throat: No nasal congestion or sore throat.   Respiratory: No shortness of breath or cough.   Cardiovascular: No chest pain, palpitations, or peripheral edema.   Gastrointestinal: No nausea, vomiting, or abdominal pain.   Genitourinary: No dysuria.  Musculoskeletal:  Tailbone pain worse with prolonged sitting    Past Surgical History:   Procedure Laterality Date    ANKLE SURGERY Right     bilatteral cataracts removed with lens implants Bilateral     CARPAL TUNNEL RELEASE       SECTION      CHOLECYSTECTOMY      HERNIA REPAIR      HYSTERECTOMY      INJECTION OF ANESTHETIC AGENT AROUND MEDIAL BRANCH NERVES INNERVATING CERVICAL FACET JOINT Bilateral 2023    Procedure: Block-nerve-medial branch-cervical;  Surgeon: Vicky Perez MD;  Location: [facility] OR;  Service: Pain Management;  Laterality: Bilateral;  Medial Branch Block.. C4-C6    INJECTION OF ANESTHETIC AGENT AROUND MEDIAL BRANCH NERVES INNERVATING LUMBAR FACET JOINT Bilateral 2023    Procedure: BLOCK, NERVE, FACET JOINT, LUMBAR, MEDIAL BRANCH Bilateral L3-5;  Surgeon: Vicky Perez MD;  Location: [facility] OR;  Service: Pain Management;  Laterality: Bilateral;  L3-5    INJECTION OF ANESTHETIC AGENT AROUND MEDIAL  BRANCH NERVES INNERVATING LUMBAR FACET JOINT Bilateral 7/5/2023    Procedure: Block-nerve-medial branch-lumbar;  Surgeon: Vicky Perez MD;  Location: [facility] OR;  Service: Pain Management;  Laterality: Bilateral;  Facet Joint Injection, Bilateral Lumbar L5-S1    INSERTION OF IMPLANTABLE LOOP RECORDER N/A     nonfunctioning    neck fusion      SPINAL CORD STIMULATOR IMPLANT      TONSILLECTOMY      TOTAL KNEE REPLACEMENT USING COMPUTER NAVIGATION Bilateral         Past Medical History:   Diagnosis Date    Arthritis     Chronic back pain     GERD (gastroesophageal reflux disease)     Heart murmur     Herpes     HTN (hypertension)     Insomnia     OAB (overactive bladder)     Seasonal allergies     Syringomyelia         OBJECTIVE:    Vitals:    12/12/23 1049   BP: (!) 158/104   Pulse: 65   Temp: 98.1 °F (36.7 °C)      Pain Disability Index  Family/Home Responsibilities:: 5  Recreation:: 5  Social Activity:: 4  Occupation:: 3  Sexual Behavior:: 6  Self Care:: 1  Life-Support Activities:: 2  Pain Disability Index (PDI): 26      Physical Exam:   General: Well-developed, well-nourished.  Neuro: Alert and oriented x 3.  Psych: Normal mood and affect.  HEENT: Normocephalic. PERRLA EOM intact. Nose and throat clear.  Lungs: Clear to auscultation and percussion.  Heart: Regular rate and rhythm   Abdomen: Soft non-tender. Bowel sounds positive. No rebound tenderness.  Skin: No rashes or open wounds  Musculoskeletal:  Pain with palpation to coccyx.  Antalgic gait, independent ambulator bilateral upper and lower extremities 5/5 motor.  No sensory deficits to bilateral upper and lower extremities.    ASSESSMENT:  1. Coccydynia    2. DDD (degenerative disc disease), lumbar    3. Lumbar radiculitis       PLAN:  Plan for to proceed with coccygeal ligament injection on 12/21/2023. The patient has been given preoperative instructions and prescriptions for post-operative medication. Post-operative appointment is scheduled for 2 weeks.   Patient was instructed to hold Voltaren gel, aspirin 81 mg, Omega 3/fish oil and multivitamin 7 days prior to procedure and resume medications postop.  She verified understanding.  Postop Visit is being requested today.

## 2023-12-12 NOTE — PROGRESS NOTES
ADMISSION HISTORY & PHYSICAL    SUBJECTIVE:    CHIEF COMPLAINT: Low-back Pain (Pre op 23 C/O pain level 5, Taking pain meds  and using pain patches PRN.)       History of Present Illness: 78 y.o. female presents today for preoperative evaluation for coccygeal ligament injection on 2023. I reviewed the indications for procedure. The risks and benefits of the proposed and alternative treatments were discussed with the patient. Questions pertinent to the procedure were solicited and answered. No assurances were given. Informed consent was obtained. The patient expressed good understanding and wished to proceed with scheduling the procedure.     Review of Systems:   Constitutional: No fever, weakness, or fatigue.   Ear/Nose/Mouth/Throat: No nasal congestion or sore throat.   Respiratory: No shortness of breath or cough.   Cardiovascular: No chest pain, palpitations, or peripheral edema.   Gastrointestinal: No nausea, vomiting, or abdominal pain.   Genitourinary: No dysuria.  Musculoskeletal:  Tailbone pain worse with prolonged sitting    Past Surgical History:   Procedure Laterality Date    ANKLE SURGERY Right     bilatteral cataracts removed with lens implants Bilateral     CARPAL TUNNEL RELEASE       SECTION      CHOLECYSTECTOMY      HERNIA REPAIR      HYSTERECTOMY      INJECTION OF ANESTHETIC AGENT AROUND MEDIAL BRANCH NERVES INNERVATING CERVICAL FACET JOINT Bilateral 2023    Procedure: Block-nerve-medial branch-cervical;  Surgeon: Vicky Perez MD;  Location: [facility] OR;  Service: Pain Management;  Laterality: Bilateral;  Medial Branch Block.. C4-C6    INJECTION OF ANESTHETIC AGENT AROUND MEDIAL BRANCH NERVES INNERVATING LUMBAR FACET JOINT Bilateral 2023    Procedure: BLOCK, NERVE, FACET JOINT, LUMBAR, MEDIAL BRANCH Bilateral L3-5;  Surgeon: Vicky Perez MD;  Location: [facility] OR;  Service: Pain Management;  Laterality: Bilateral;  L3-5    INJECTION OF ANESTHETIC AGENT AROUND MEDIAL  BRANCH NERVES INNERVATING LUMBAR FACET JOINT Bilateral 7/5/2023    Procedure: Block-nerve-medial branch-lumbar;  Surgeon: Vicky Perez MD;  Location: [facility] OR;  Service: Pain Management;  Laterality: Bilateral;  Facet Joint Injection, Bilateral Lumbar L5-S1    INSERTION OF IMPLANTABLE LOOP RECORDER N/A     nonfunctioning    neck fusion      SPINAL CORD STIMULATOR IMPLANT      TONSILLECTOMY      TOTAL KNEE REPLACEMENT USING COMPUTER NAVIGATION Bilateral         Past Medical History:   Diagnosis Date    Arthritis     Chronic back pain     GERD (gastroesophageal reflux disease)     Heart murmur     Herpes     HTN (hypertension)     Insomnia     OAB (overactive bladder)     Seasonal allergies     Syringomyelia         OBJECTIVE:    Vitals:    12/12/23 1049   BP: (!) 158/104   Pulse: 65   Temp: 98.1 °F (36.7 °C)      Pain Disability Index  Family/Home Responsibilities:: 5  Recreation:: 5  Social Activity:: 4  Occupation:: 3  Sexual Behavior:: 6  Self Care:: 1  Life-Support Activities:: 2  Pain Disability Index (PDI): 26      Physical Exam:   General: Well-developed, well-nourished.  Neuro: Alert and oriented x 3.  Psych: Normal mood and affect.  HEENT: Normocephalic. PERRLA EOM intact. Nose and throat clear.  Lungs: Clear to auscultation and percussion.  Heart: Regular rate and rhythm   Abdomen: Soft non-tender. Bowel sounds positive. No rebound tenderness.  Skin: No rashes or open wounds  Musculoskeletal:  Pain with palpation to coccyx.  Antalgic gait, independent ambulator bilateral upper and lower extremities 5/5 motor.  No sensory deficits to bilateral upper and lower extremities.    ASSESSMENT:  1. Coccydynia    2. DDD (degenerative disc disease), lumbar    3. Lumbar radiculitis       PLAN:  Plan for to proceed with coccygeal ligament injection on 12/21/2023. The patient has been given preoperative instructions and prescriptions for post-operative medication. Post-operative appointment is scheduled for 2 weeks.   Patient was instructed to hold Voltaren gel, aspirin 81 mg, Omega 3/fish oil and multivitamin 7 days prior to procedure and resume medications postop.  She verified understanding.  Postop Visit is being requested today.

## 2023-12-20 NOTE — DISCHARGE INSTRUCTIONS
INJECTION, CARE AFTER      NO HEAVY LIFTING FOR ONE WEEK  NO HEAT FOR 24 HOURS  APPLY ICE PACK FOR COMFORT TO SITES  REMOVE BAND-AIDS TOMORROW AND THEN YOU MAY SHOWER  NO TUB SOAKING FOR 3-5 DAYS    These instructions give you information on caring for yourself after your procedure. Your doctor may also give you specific instructions. Call your doctor if you have any problems or questions after your procedure.     HOME CARE  Do not drive or use any machinery for the next 24 hours.  Do not do any hard physical activity for the next 24 hours  Do not use a heating pad in area of injection  You may go back to eating as usual    SIDE EFFECTS THAT COULD HAPPEN UP TO 4 HOURS AFTER THE INJECTION  If you have leg weakness or numbness, have someone help you walk. If the weakness or numbness does not go away, or if it gets worse go to the emergency department.  If you have dizziness, lie down right away. This usually helps. Sit up slowly and then when you have been sitting for a few minutes then stand up.  If you have a mild headache, drink fluids (especially drinks with caffeine) Call your doctor if the headache gets worse or persists.  When the numbing medicine wears off you may feel some discomfort where you had the shot. It usually only lasts for a few days. You may put ice over the injection site. Leave ice on for 20 minutes at a time and protect your skin during use.   You may feel minor back pain and stiffness at the site of the shot. Call your doctor if this pain gets worse or does not improve. You may feel nauseous or vomit several hours after your procedure. If this happens, try drinking small amounts of clear liquids until you feel better. If you continue to feel nauseated or continue vomiting, get help right away.    Steroids may take several days to start working. The shot usually helps leg pain more than back pain. The shot will not fix what is causing the pain but may take away some of the pain. This pain relief  may last from 2 weeks to 6 months.     GET HELP RIGHT AWAY IF:  You have very bad pain, headache or neck pain or stiffness  There is a change in your vision (double or blurry vision)  You have a fever over 101 or chills  You have swelling or redness at the injection site  You get weaker  You are not able to control your bladder or bowels  You are not able to urinate

## 2023-12-21 ENCOUNTER — HOSPITAL ENCOUNTER (OUTPATIENT)
Facility: HOSPITAL | Age: 78
Discharge: HOME | End: 2023-12-21
Attending: ANESTHESIOLOGY | Admitting: ANESTHESIOLOGY
Payer: MEDICARE

## 2023-12-21 ENCOUNTER — ANESTHESIA (OUTPATIENT)
Dept: SURGERY | Facility: HOSPITAL | Age: 78
End: 2023-12-21
Payer: MEDICARE

## 2023-12-21 ENCOUNTER — ANESTHESIA EVENT (OUTPATIENT)
Dept: SURGERY | Facility: HOSPITAL | Age: 78
End: 2023-12-21
Payer: MEDICARE

## 2023-12-21 VITALS
SYSTOLIC BLOOD PRESSURE: 186 MMHG | DIASTOLIC BLOOD PRESSURE: 82 MMHG | BODY MASS INDEX: 24.46 KG/M2 | TEMPERATURE: 98 F | HEIGHT: 62 IN | HEART RATE: 54 BPM | OXYGEN SATURATION: 98 % | WEIGHT: 132.94 LBS | RESPIRATION RATE: 18 BRPM

## 2023-12-21 DIAGNOSIS — M53.3 COCCYDYNIA: ICD-10-CM

## 2023-12-21 PROCEDURE — 20550 NJX 1 TENDON SHEATH/LIGAMENT: CPT | Mod: ,,, | Performed by: ANESTHESIOLOGY

## 2023-12-21 PROCEDURE — 20550 PR INJECT TENDON SHEATH/LIGAMENT: ICD-10-PCS | Mod: ,,, | Performed by: ANESTHESIOLOGY

## 2023-12-21 PROCEDURE — 25000003 PHARM REV CODE 250: Performed by: ANESTHESIOLOGY

## 2023-12-21 PROCEDURE — D9220A PRA ANESTHESIA: Mod: ANES,,, | Performed by: ANESTHESIOLOGY

## 2023-12-21 PROCEDURE — D9220A PRA ANESTHESIA: ICD-10-PCS | Mod: CRNA,,, | Performed by: NURSE ANESTHETIST, CERTIFIED REGISTERED

## 2023-12-21 PROCEDURE — D9220A PRA ANESTHESIA: Mod: CRNA,,, | Performed by: NURSE ANESTHETIST, CERTIFIED REGISTERED

## 2023-12-21 PROCEDURE — 63600175 PHARM REV CODE 636 W HCPCS: Performed by: NURSE ANESTHETIST, CERTIFIED REGISTERED

## 2023-12-21 PROCEDURE — 20550 NJX 1 TENDON SHEATH/LIGAMENT: CPT | Performed by: ANESTHESIOLOGY

## 2023-12-21 PROCEDURE — 63600175 PHARM REV CODE 636 W HCPCS: Performed by: ANESTHESIOLOGY

## 2023-12-21 PROCEDURE — D9220A PRA ANESTHESIA: ICD-10-PCS | Mod: ANES,,, | Performed by: ANESTHESIOLOGY

## 2023-12-21 PROCEDURE — 37000008 HC ANESTHESIA 1ST 15 MINUTES: Performed by: ANESTHESIOLOGY

## 2023-12-21 PROCEDURE — 25000003 PHARM REV CODE 250: Performed by: NURSE ANESTHETIST, CERTIFIED REGISTERED

## 2023-12-21 RX ORDER — BUPIVACAINE HYDROCHLORIDE 2.5 MG/ML
INJECTION, SOLUTION EPIDURAL; INFILTRATION; INTRACAUDAL
Status: DISCONTINUED | OUTPATIENT
Start: 2023-12-21 | End: 2023-12-21 | Stop reason: HOSPADM

## 2023-12-21 RX ORDER — ONDANSETRON 2 MG/ML
4 INJECTION INTRAMUSCULAR; INTRAVENOUS DAILY PRN
Status: CANCELLED | OUTPATIENT
Start: 2023-12-21

## 2023-12-21 RX ORDER — LORAZEPAM 2 MG/ML
0.25 INJECTION INTRAMUSCULAR ONCE AS NEEDED
Status: CANCELLED | OUTPATIENT
Start: 2023-12-21 | End: 2035-05-19

## 2023-12-21 RX ORDER — PROPOFOL 10 MG/ML
VIAL (ML) INTRAVENOUS
Status: DISCONTINUED | OUTPATIENT
Start: 2023-12-21 | End: 2023-12-21

## 2023-12-21 RX ORDER — LIDOCAINE HYDROCHLORIDE 10 MG/ML
INJECTION, SOLUTION EPIDURAL; INFILTRATION; INTRACAUDAL; PERINEURAL
Status: DISCONTINUED | OUTPATIENT
Start: 2023-12-21 | End: 2023-12-21

## 2023-12-21 RX ORDER — IPRATROPIUM BROMIDE AND ALBUTEROL SULFATE 2.5; .5 MG/3ML; MG/3ML
3 SOLUTION RESPIRATORY (INHALATION)
Status: CANCELLED | OUTPATIENT
Start: 2023-12-21

## 2023-12-21 RX ORDER — LIDOCAINE HYDROCHLORIDE 10 MG/ML
INJECTION, SOLUTION EPIDURAL; INFILTRATION; INTRACAUDAL; PERINEURAL
Status: DISCONTINUED | OUTPATIENT
Start: 2023-12-21 | End: 2023-12-21 | Stop reason: HOSPADM

## 2023-12-21 RX ORDER — MEPERIDINE HYDROCHLORIDE 25 MG/ML
12.5 INJECTION INTRAMUSCULAR; INTRAVENOUS; SUBCUTANEOUS EVERY 10 MIN PRN
Status: CANCELLED | OUTPATIENT
Start: 2023-12-21 | End: 2023-12-22

## 2023-12-21 RX ORDER — SODIUM CHLORIDE, SODIUM GLUCONATE, SODIUM ACETATE, POTASSIUM CHLORIDE AND MAGNESIUM CHLORIDE 30; 37; 368; 526; 502 MG/100ML; MG/100ML; MG/100ML; MG/100ML; MG/100ML
INJECTION, SOLUTION INTRAVENOUS CONTINUOUS
Status: CANCELLED | OUTPATIENT
Start: 2023-12-21 | End: 2024-01-20

## 2023-12-21 RX ORDER — LIDOCAINE HYDROCHLORIDE 10 MG/ML
INJECTION, SOLUTION EPIDURAL; INFILTRATION; INTRACAUDAL; PERINEURAL
Status: DISCONTINUED
Start: 2023-12-21 | End: 2023-12-21 | Stop reason: HOSPADM

## 2023-12-21 RX ORDER — TRIAMCINOLONE ACETONIDE 40 MG/ML
INJECTION, SUSPENSION INTRA-ARTICULAR; INTRAMUSCULAR
Status: DISCONTINUED
Start: 2023-12-21 | End: 2023-12-21 | Stop reason: HOSPADM

## 2023-12-21 RX ORDER — PROCHLORPERAZINE EDISYLATE 5 MG/ML
5 INJECTION INTRAMUSCULAR; INTRAVENOUS EVERY 30 MIN PRN
Status: CANCELLED | OUTPATIENT
Start: 2023-12-21

## 2023-12-21 RX ORDER — ONDANSETRON 4 MG/1
8 TABLET, ORALLY DISINTEGRATING ORAL EVERY 6 HOURS PRN
Status: CANCELLED | OUTPATIENT
Start: 2023-12-21

## 2023-12-21 RX ORDER — LIDOCAINE HYDROCHLORIDE 10 MG/ML
1 INJECTION, SOLUTION EPIDURAL; INFILTRATION; INTRACAUDAL; PERINEURAL ONCE
Status: CANCELLED | OUTPATIENT
Start: 2023-12-21 | End: 2023-12-21

## 2023-12-21 RX ORDER — TRIAMCINOLONE ACETONIDE 40 MG/ML
INJECTION, SUSPENSION INTRA-ARTICULAR; INTRAMUSCULAR
Status: DISCONTINUED | OUTPATIENT
Start: 2023-12-21 | End: 2023-12-21 | Stop reason: HOSPADM

## 2023-12-21 RX ADMIN — PROPOFOL 20 MG: 10 INJECTION, EMULSION INTRAVENOUS at 11:12

## 2023-12-21 RX ADMIN — LIDOCAINE HYDROCHLORIDE 20 MG: 10 INJECTION, SOLUTION EPIDURAL; INFILTRATION; INTRACAUDAL; PERINEURAL at 11:12

## 2023-12-21 RX ADMIN — PROPOFOL 30 MG: 10 INJECTION, EMULSION INTRAVENOUS at 11:12

## 2023-12-21 NOTE — TRANSFER OF CARE
"Anesthesia Transfer of Care Note    Patient: Elizabeth Bruno    Procedure(s) Performed: Procedure(s) (LRB):  Injection  coccygeal ligament injection (N/A)    Patient location: PACU    Anesthesia Type: MAC    Transport from OR: Transported from OR on room air with adequate spontaneous ventilation    Post pain: adequate analgesia    Post assessment: no apparent anesthetic complications and tolerated procedure well    Post vital signs: stable    Level of consciousness: awake    Complications: none    Transfer of care protocol was followed      Last vitals: Visit Vitals  BP (!) 185/88   Pulse 61   Temp 36.6 °C (97.8 °F) (Oral)   Ht 5' 2" (1.575 m)   Wt 60.3 kg (132 lb 15 oz)   SpO2 99%   Breastfeeding No   BMI 24.31 kg/m²     "

## 2023-12-21 NOTE — OP NOTE
Coccygeal Ligament Injection    Pre-Procedure Diagnoses:  1. Chronic back pain greater than 3 months  2. Coccydynia    Post-Procedure Diagnoses:  1. Chronic back pain greater than 3 months  2. Coccydynia    Anesthesia:  Local and MAC    Estimated Blood Loss:  None    Complications:  None    Informed Consent:  The procedure, risks, benefits, and alternatives were discussed with the patient. There were no contraindications to the procedure. The patient expressed understanding and agreed to proceed. Fully informed written consent was obtained.     Description of the Procedure:  The patient was taken to the operating room. IV access was obtained prior to the start of the procedure. The patient was positioned prone on the fluoroscopy table. Continuous hemodynamic monitoring was initiated and continued throughout the duration of the procedure. The skin overlying the lumbosacral spine was prepped with Chloraprep and draped into a sterile field. Fluoroscopy was used to identify the location of the coccygeal ligament. Skin anesthesia was achieved using 1 mL of 1% lidocaine over the injection site. A 22 gauge 3.5 inch Quinke spinal needle was slowly inserted and advanced under intermittent fluoroscopy into the coccygeal ligament. Proper needle position was confirmed under AP, oblique, and lateral fluoroscopic views. Negative aspiration was confirmed. 1 ml of Isovue was injected, which revealed spread in the ligament. Then a combination of 40 mg of Kenalog and 1 mL of 0.25% bupivacaine was easily injected. There was no pain on injection. The needle was removed intact and bleeding was nil. Sterile bandages were applied. The patient was taken to the recovery room for further observation in stable condition. The patient was then discharged home without any complications.

## 2023-12-21 NOTE — DISCHARGE SUMMARY
[facility] Surgical - Periop Services  Discharge Note  Short Stay    Procedure(s) (LRB):  Injection  coccygeal ligament injection (N/A)      OUTCOME: Patient tolerated treatment/procedure well without complication and is now ready for discharge.    DISPOSITION: Home or Self Care    FINAL DIAGNOSIS:  <principal problem not specified>    FOLLOWUP: In clinic    DISCHARGE INSTRUCTIONS:  No discharge procedures on file.     TIME SPENT ON DISCHARGE: 5 minutes

## 2023-12-21 NOTE — ANESTHESIA PREPROCEDURE EVALUATION
"                                                                                                             12/21/2023  Elizabeth Bruno is a 78 y.o., femalewith moderate aortic stenosis (asymptomatic) and preserved EF presents as an outpatient for coccygeal ligament injection.  Patient tolerated IV sedation for medial branch block in July.     2D echo November 2020   EF 56%   Moderate aortic stenosis (MAICOL 1.1 centimeter sq with a gradient of 12)    Last 3 sets of Vitals        12/12/2023    10:51 AM 12/16/2023     2:53 PM 12/21/2023    10:59 AM   Vitals - 1 value per visit   SYSTOLIC   196   DIASTOLIC   48   Pulse   66   Temp   36.6 °C (97.8 °F)   SPO2   98 %   Weight (lb)  130    Weight (kg)  58.968    Height  5' 2" (1.575 m)    BMI (Calculated)  23.8    Pain Score Five           Lab Results   Component Value Date    WBC 5.5 11/21/2023    HGB 11.3 11/21/2023    HCT 37.0 11/21/2023    MCV 68.6 03/25/2022     11/21/2023      Pre-op Assessment    I have reviewed the Patient Summary Reports.    I have reviewed the NPO Status.   I have reviewed the Medications.     Review of Systems  Anesthesia Hx:               Denies Personal Hx of Anesthesia complications.                    Social:  Non-Smoker       Cardiovascular:     Hypertension                Functional Capacity good / => 4 METS    Aortic Stenosis (AS), moderate                          Hepatic/GI:     GERD                 Physical Exam  General: Well nourished, Cooperative, Alert and Oriented    Airway:  Mallampati: II   Mouth Opening: Normal  TM Distance: Normal  Tongue: Normal  Neck ROM: Normal ROM    Dental:  Intact    Chest/Lungs:  Clear to auscultation, Normal Respiratory Rate    Heart:  Rate: Normal  Rhythm: Regular Rhythm        Anesthesia Plan  Type of Anesthesia, risks & benefits discussed:    Anesthesia Type: Gen Natural Airway  Intra-op Monitoring Plan: Standard ASA Monitors  Induction:  IV  Informed Consent: Informed consent signed with " the Patient and all parties understand the risks and agree with anesthesia plan.  All questions answered.   ASA Score: 3  Day of Surgery Review of History & Physical: H&P Update referred to the surgeon/provider.    Ready For Surgery From Anesthesia Perspective.     .

## 2023-12-22 NOTE — ANESTHESIA POSTPROCEDURE EVALUATION
Anesthesia Post Evaluation    Patient: Elizabeth Bruno    Procedure(s) Performed: Procedure(s) (LRB):  Injection  coccygeal ligament injection (N/A)    Final Anesthesia Type: general      Patient location during evaluation: floor  Patient participation: Yes- Able to Participate  Level of consciousness: awake and alert  Post-procedure vital signs: reviewed and stable  Pain management: adequate  Airway patency: patent    PONV status at discharge: No PONV  Anesthetic complications: no      Cardiovascular status: blood pressure returned to baseline and bradycardic  Respiratory status: spontaneous ventilation and room air  Hydration status: euvolemic  Follow-up not needed.              Vitals Value Taken Time   /82 12/21/23 1236   Temp 36.7 °C (98 °F) 12/21/23 1151   Pulse 54 12/21/23 1236   Resp 18 12/21/23 1236   SpO2 98 % 12/21/23 1236         No case tracking events are documented in the log.      Pain/Rene Score: No data recorded

## 2024-01-04 ENCOUNTER — PATIENT MESSAGE (OUTPATIENT)
Dept: PAIN MEDICINE | Facility: CLINIC | Age: 79
End: 2024-01-04
Payer: MEDICARE

## 2024-01-04 DIAGNOSIS — G89.29 CHRONIC NECK PAIN: Primary | ICD-10-CM

## 2024-01-04 DIAGNOSIS — M54.2 CHRONIC NECK PAIN: Primary | ICD-10-CM

## 2024-01-08 RX ORDER — LIDOCAINE 50 MG/G
PATCH TOPICAL
Qty: 30 PATCH | Refills: 1 | Status: SHIPPED | OUTPATIENT
Start: 2024-01-08

## 2024-01-11 ENCOUNTER — OFFICE VISIT (OUTPATIENT)
Dept: PAIN MEDICINE | Facility: CLINIC | Age: 79
End: 2024-01-11
Payer: MEDICARE

## 2024-01-11 VITALS
DIASTOLIC BLOOD PRESSURE: 87 MMHG | HEART RATE: 74 BPM | TEMPERATURE: 98 F | SYSTOLIC BLOOD PRESSURE: 152 MMHG | BODY MASS INDEX: 24.29 KG/M2 | WEIGHT: 132 LBS | HEIGHT: 62 IN

## 2024-01-11 DIAGNOSIS — M54.2 CERVICALGIA: Primary | ICD-10-CM

## 2024-01-11 DIAGNOSIS — M53.3 COCCYDYNIA: ICD-10-CM

## 2024-01-11 DIAGNOSIS — M54.12 CERVICAL RADICULITIS: ICD-10-CM

## 2024-01-11 PROCEDURE — 99214 OFFICE O/P EST MOD 30 MIN: CPT | Mod: ,,, | Performed by: NURSE PRACTITIONER

## 2024-01-11 PROCEDURE — 1159F MED LIST DOCD IN RCRD: CPT | Mod: CPTII,,, | Performed by: NURSE PRACTITIONER

## 2024-01-11 PROCEDURE — 1125F AMNT PAIN NOTED PAIN PRSNT: CPT | Mod: CPTII,,, | Performed by: NURSE PRACTITIONER

## 2024-01-11 PROCEDURE — 3288F FALL RISK ASSESSMENT DOCD: CPT | Mod: CPTII,,, | Performed by: NURSE PRACTITIONER

## 2024-01-11 PROCEDURE — 3079F DIAST BP 80-89 MM HG: CPT | Mod: CPTII,,, | Performed by: NURSE PRACTITIONER

## 2024-01-11 PROCEDURE — 1101F PT FALLS ASSESS-DOCD LE1/YR: CPT | Mod: CPTII,,, | Performed by: NURSE PRACTITIONER

## 2024-01-11 PROCEDURE — 3077F SYST BP >= 140 MM HG: CPT | Mod: CPTII,,, | Performed by: NURSE PRACTITIONER

## 2024-01-11 PROCEDURE — 1160F RVW MEDS BY RX/DR IN RCRD: CPT | Mod: CPTII,,, | Performed by: NURSE PRACTITIONER

## 2024-01-11 NOTE — PROGRESS NOTES
Subjective:      Patient ID: Elizabeth Bruno is a 78 y.o. female.    Chief Complaint: Low-back Pain (Post-op coccygeal ligament injection 12/21/23, pt states she received great relief, pian comes and goes pain level decreased, OTC and RX for relief, pain level 1/10) and Neck Pain (C/o neck pain today, pain level 6/10)    Referred by: No ref. provider found     HPI:  This is a pleasant 78-year-old female patient presenting as a follow-up for coccydynia after receiving a coccygeal ligament injection on 12/21/2023.  She had excellent pain relief that is now down to a 1/10.  She is established with Dr. Perez and is new to me.  During her last office visit she relayed  patient  was last seen here back in July, at which time she underwent bilateral L5-S1 facet injections.  She did get some relief from his injections.  She has been in [place] for the past 4 months.  She reports increasing pain in her tailbone, and is wanting to undergo an injection to help with this.  She did see a physician while in [place], who prescribed her some lidocaine patches.    She would like to focus on her neck and arm pain return today she denies any injuries states her pain has returned..  Patient has ongoing posterior neck pain that will radiate down her arms if she is driving too long her hands will cramp up.  This will also wake her up at night.  Pain to her cervical axial spine is more achy and deep and worse when she looks up and turns her neck laterally.  Pain to her bilateral shoulder blades will radiate to bilateral hands is described as more sharp radiating sensation.  This also interferes with her sleep.  Her pain score will elevate with the aforementioned activities in the neck, scapula and hands to a 8-9/10.  In the past she had completed a cervical diagnostic medial branch block and unfortunately was unable to finish the 2nd series of cervical diagnostic medial branch blocks after she received at least 80% relief of pain  to the area.  She was traveling during that time.      She denies urinary retention, fecal incontinence, spasticity saddle anesthesia.  She does have pertinent past surgical history of neurosurgical fusion C5-7.  She also has a implanted spinal cord stimulator and loop recorder.  No history of a defibrillator or pacemaker implant.        Vital signs:   Vitals:    01/11/24 1126   PainSc:   1     There is no height or weight on file to calculate BMI.  Pain Disability Index (PDI): 12       Interventional Pain History  12/21/2023 coccygeal ligament injection  07/05/2023:bilateral lumbar facet joint injection L5-S1.  06/2023: Bilateral L5-S1 facet injections  6/14/2023: MBB C4-C6  03/09/2023:Bilateral MBB L3-L5     ROS: Tailbone pain + neck pain.      CT Cervical Spine 2019:    FINDINGS:       Vertebral bodies and posterior elements: Normal vertebral body heights. Anterior fusion hardware noted at C5-C7.     Cord signal: Normal     Marrow signal: Minimal degenerative edema surrounds a few disc spaces.     Prevertebral and paraspinal soft tissues: Normal     Other: Multilevel anterior flowing ossification keeping with idiopathic skeletal hyperostosis.     C2-3: Congenitally narrowed canal, small disc protrusion, mild canal narrowing. Mild to moderate right and mild left foraminal narrowing.     C3-4: Moderate disc space degeneration. Small disc osteophyte complex. Congenitally narrowed canal. Moderate canal narrowing. Moderate left and moderate right foraminal narrowing.     C4-5: Uncovertebral arthropathy. Congenitally narrowed canal. Mild canal narrowing. Moderate right facet arthropathy. Moderate left and severe right foraminal narrowing.     C5-6: Fused disc space. No disc protrusion or canal narrowing. Moderate left foraminal narrowing, otherwise normal.     C6-7: Partially fused disc space. Small disc osteophyte complex, congenitally narrowed canal, mild to moderate canal narrowing, severe left and moderate right  foraminal narrowing.     C7-T1: Moderate disc space narrowing. No disc protrusion or canal narrowing. Normal foramen.   Objective:          Physical Exam  General: Well developed; normal weight A&O x 3; No anxiety/depression; NAD  Mental Status: Oriented to person, palce and time. Displays appropriate mood & affect.  Head: Norm cephalic and atraumatic  Neck:  Bilateral cervical paraspinal banding.  Limited range motion and pain with lateral turning and extension.  Patient was able to raise bilateral arms parallel and became more painful as she tried to raise them above her head.  Equal handgrip bilaterally.  Eyes: normal conjunctiva, normal lids, normal pupils  ENT and mouth: normal external ear, nose, and no lesions noted on the lips.  Respiratory: Symmetrical, Unlabored. No dyspnea  CV: normal rhythm and rate. No peripheral edema.   Abdomen: Non-distended    Extremities:  Gen: No cyanosis or tenderness to palpation bilateral upper and lower extremities  Skin: Warm, pink, dry, no rashes, no lesions on the lumbar spine  Strength: 5/5 motor strength bilateral upper and lower extremities  ROM: Full ROM in bilateral knees without pain or instability.    Neuro:  Gait: no altalgic lean, normal toe and heel raise. Independent ambulator.  DTR's: 2+ in bilateral patellar,  Sensory: Intact to light touch bilateral  upper and lower extremities    Spine: Normal lordosis. No scoliosis  L-spine ROM: Full ROM to flexion, extension, bilateral rotation,   Straight Leg Raise:  Negative bilaterally  SI Joint: No tenderness to palpation bilaterally.            Assessment:     Patient had great relief to pain associated in the coccyx region after her injection.  Now that this improved she would like to 10 to her posterior neck bilateral scapular and bilateral arm pain addressed.  This is worse with prolonged driving and holding the steering wheel as her hands will cramp up and it gets numb.  She also has limited range of motion raising  both hands above the neck and discomfort lying down at night to the back of her neck.  Her primary pain with the radiation is described as sharp stabbing and she has deep aching pain to her posterior neck.    She has pertinent cervical fusion history.  MRI cervical spine shows foraminal narrowing different levels (C3-C7)    Request submitted for a cervical epidural steroid injection via interlaminar approach at C7-T1      Hold asa 81 mg and diclofenac/Voltaren gel and all multivitamins as well as omega-3 and other supplements including vitamin-E for 7 days prior to procedure.    Recommend patient use either diclofenac oral or the gel not both.  Staff is calling to confirm which when she would prefer.She also continues to take gabapentin that provides minimal pain relief.    Future considerations diagnostic MBB to cervical region as she has symptomatic cervical arthopathy (no pacemaker, + SCS only and LOOP recorder.)      Encounter Diagnosis   Name Primary?    Coccydynia Yes         Plan:       Elizabeth was seen today for low-back pain and neck pain.    Diagnoses and all orders for this visit:    Coccydynia               Past Medical History:   Diagnosis Date    Arthritis     Chronic back pain     GERD (gastroesophageal reflux disease)     Heart murmur     Herpes     HTN (hypertension)     Insomnia     OAB (overactive bladder)     Seasonal allergies     Syringomyelia        Past Surgical History:   Procedure Laterality Date    ANKLE SURGERY Right     bilatteral cataracts removed with lens implants Bilateral     CARPAL TUNNEL RELEASE       SECTION      CHOLECYSTECTOMY      HERNIA REPAIR      HYSTERECTOMY      INJECTION N/A 2023    Procedure: Injection  coccygeal ligament injection;  Surgeon: Vicky Perez MD;  Location: [facility];  Service: Pain Management;  Laterality: N/A;  coccygeal ligament injection    INJECTION OF ANESTHETIC AGENT AROUND MEDIAL BRANCH NERVES INNERVATING CERVICAL FACET JOINT  Bilateral 6/14/2023    Procedure: Block-nerve-medial branch-cervical;  Surgeon: Vicky Perez MD;  Location: [facility] OR;  Service: Pain Management;  Laterality: Bilateral;  Medial Branch Block.. C4-C6    INJECTION OF ANESTHETIC AGENT AROUND MEDIAL BRANCH NERVES INNERVATING LUMBAR FACET JOINT Bilateral 03/09/2023    Procedure: BLOCK, NERVE, FACET JOINT, LUMBAR, MEDIAL BRANCH Bilateral L3-5;  Surgeon: Vicky Perez MD;  Location: [facility] OR;  Service: Pain Management;  Laterality: Bilateral;  L3-5    INJECTION OF ANESTHETIC AGENT AROUND MEDIAL BRANCH NERVES INNERVATING LUMBAR FACET JOINT Bilateral 7/5/2023    Procedure: Block-nerve-medial branch-lumbar;  Surgeon: Vicky Perez MD;  Location: [facility] OR;  Service: Pain Management;  Laterality: Bilateral;  Facet Joint Injection, Bilateral Lumbar L5-S1    INSERTION OF IMPLANTABLE LOOP RECORDER N/A     nonfunctioning    neck fusion      SPINAL CORD STIMULATOR IMPLANT      TONSILLECTOMY      TOTAL KNEE REPLACEMENT USING COMPUTER NAVIGATION Bilateral        Family History   Problem Relation Age of Onset    Heart disease Mother     Heart disease Father        Social History     Socioeconomic History    Marital status:    Tobacco Use    Smoking status: Never    Smokeless tobacco: Never   Substance and Sexual Activity    Alcohol use: Not Currently    Drug use: Never    Sexual activity: Not Currently       Current Outpatient Medications   Medication Sig Dispense Refill    ascorbic acid, vitamin C, (VITAMIN C) 1000 MG tablet Take 1,000 mg by mouth once daily.      aspirin (ECOTRIN) 81 MG EC tablet Take 81 mg by mouth once daily.      B-complex with vitamin C (Z-BEC OR EQUIV) tablet Take 1 tablet by mouth once daily.      calcium carbonate (OS-CARI) 500 mg calcium (1,250 mg) tablet Take by mouth.      calcium-vitamin D 600 mg-10 mcg (400 unit) Tab Take 1 tablet by mouth once daily.      carboxymethylcellulose (REFRESH PLUS) 0.5 % Dpet Apply 1 drop to eye daily as needed.       cetirizine (ZYRTEC) 10 MG tablet Take 10 mg by mouth once daily.      diclofenac (VOLTAREN) 50 MG EC tablet TAKE 1 TABLET BY MOUTH TWICE DAILY AS NEEDED FOR BACK PAIN      diclofenac sodium (VOLTAREN) 1 % Gel Apply topically daily as needed.      EScitalopram oxalate (LEXAPRO) 20 MG tablet Take 20 mg by mouth nightly.      famotidine (PEPCID) 40 MG tablet Take 40 mg by mouth nightly.      furosemide (LASIX) 20 MG tablet Take 20 mg by mouth once daily.      gabapentin (NEURONTIN) 600 MG tablet Take 1 tablet (600 mg total) by mouth 3 (three) times daily. 90 tablet 2    glucosamine/chondr rivera A sod (OSTEO BI-FLEX ORAL) Take 1 tablet by mouth Daily.      HYDROcodone-acetaminophen (NORCO) 5-325 mg per tablet Take 1 tablet by mouth every 8 (eight) hours as needed.      hyoscyamine (ANASPAZ,LEVSIN) 0.125 mg Tab Take 125 mcg by mouth 2 (two) times daily.      LIDOcaine (LIDODERM) 5 % Apply 1 patch topically per day as needed for pain. 30 patch 1    methocarbamoL (ROBAXIN) 500 MG Tab       midodrine (PROAMATINE) 2.5 MG Tab Take 2.5 mg by mouth 2 (two) times daily.      multivitamin with folic acid 400 mcg Tab Take 1 tablet by mouth once daily.      MYRBETRIQ 25 mg Tb24 ER tablet Take 25 mg by mouth once daily.      omega 3-dha-epa-fish oil 1,000 mg (120 mg-180 mg) Cap Take 1 capsule by mouth once daily.      pantoprazole (PROTONIX) 40 MG tablet Take 40 mg by mouth 2 (two) times daily.      rosuvastatin (CRESTOR) 5 MG tablet Take 5 mg by mouth.      traZODone (DESYREL) 50 MG tablet Take 50 mg by mouth nightly.      turmeric root extract 500 mg Cap Take 1 capsule by mouth once daily.      valACYclovir (VALTREX) 500 MG tablet Take 500 mg by mouth once daily.      VITAMIN B COMPLEX ORAL Take 1 tablet by mouth every morning.      vitamin E 400 UNIT capsule Take 400 Units by mouth once daily.      zonisamide (ZONEGRAN) 100 MG Cap Take 2 capsules (200 mg total) by mouth nightly. 180 capsule 1     No current facility-administered  medications for this visit.     Facility-Administered Medications Ordered in Other Visits   Medication Dose Route Frequency Provider Last Rate Last Admin    lactated ringers infusion   Intravenous Continuous Lupe La MD           Review of patient's allergies indicates:   Allergen Reactions    Tizanidine Shortness Of Breath     Stops breathing    Pollen extracts Other (See Comments)

## 2024-02-08 DIAGNOSIS — M47.816 LUMBAR SPONDYLOSIS: ICD-10-CM

## 2024-02-08 DIAGNOSIS — M51.36 LUMBAR DEGENERATIVE DISC DISEASE: ICD-10-CM

## 2024-02-08 RX ORDER — GABAPENTIN 600 MG/1
600 TABLET ORAL 3 TIMES DAILY
Qty: 90 TABLET | Refills: 2 | Status: SHIPPED | OUTPATIENT
Start: 2024-02-08

## 2024-02-28 ENCOUNTER — OFFICE VISIT (OUTPATIENT)
Dept: PAIN MEDICINE | Facility: CLINIC | Age: 79
End: 2024-02-28
Payer: MEDICARE

## 2024-02-28 VITALS
WEIGHT: 133 LBS | BODY MASS INDEX: 24.48 KG/M2 | DIASTOLIC BLOOD PRESSURE: 90 MMHG | TEMPERATURE: 98 F | HEIGHT: 62 IN | HEART RATE: 70 BPM | SYSTOLIC BLOOD PRESSURE: 172 MMHG

## 2024-02-28 DIAGNOSIS — M54.12 CERVICAL RADICULITIS: ICD-10-CM

## 2024-02-28 DIAGNOSIS — M50.30 DDD (DEGENERATIVE DISC DISEASE), CERVICAL: Primary | ICD-10-CM

## 2024-02-28 PROCEDURE — 3080F DIAST BP >= 90 MM HG: CPT | Mod: CPTII,,, | Performed by: NURSE PRACTITIONER

## 2024-02-28 PROCEDURE — 1159F MED LIST DOCD IN RCRD: CPT | Mod: CPTII,,, | Performed by: NURSE PRACTITIONER

## 2024-02-28 PROCEDURE — 3077F SYST BP >= 140 MM HG: CPT | Mod: CPTII,,, | Performed by: NURSE PRACTITIONER

## 2024-02-28 PROCEDURE — 1101F PT FALLS ASSESS-DOCD LE1/YR: CPT | Mod: CPTII,,, | Performed by: NURSE PRACTITIONER

## 2024-02-28 PROCEDURE — 99213 OFFICE O/P EST LOW 20 MIN: CPT | Mod: ,,, | Performed by: NURSE PRACTITIONER

## 2024-02-28 PROCEDURE — 3288F FALL RISK ASSESSMENT DOCD: CPT | Mod: CPTII,,, | Performed by: NURSE PRACTITIONER

## 2024-02-28 PROCEDURE — 1125F AMNT PAIN NOTED PAIN PRSNT: CPT | Mod: CPTII,,, | Performed by: NURSE PRACTITIONER

## 2024-02-28 RX ORDER — FLUTICASONE PROPIONATE 50 MCG
1 SPRAY, SUSPENSION (ML) NASAL 2 TIMES DAILY
COMMUNITY
Start: 2024-02-06

## 2024-02-28 NOTE — PATIENT INSTRUCTIONS
Hold the following meds 1 week before and resume post op:      Aspirin 81mg  Voltaren Gel  Fish Oil and Omega 3

## 2024-02-28 NOTE — PROGRESS NOTES
ADMISSION HISTORY & PHYSICAL    SUBJECTIVE:    CHIEF COMPLAINT: Neck Pain (Pre op procedure 3/13/24 C/O pain level 6, Taking pain meds)       History of Present Illness: 79 y.o. female presents today for preoperative evaluation for interlaminar KAREN C7-T1 on 2024. I reviewed the indications for procedure. The risks and benefits of the proposed and alternative treatments were discussed with the patient. Questions pertinent to the procedure were solicited and answered. No assurances were given. Informed consent was obtained. The patient expressed good understanding and wished to proceed with scheduling the procedure.     Review of Systems:   Constitutional: No fever, weakness, or fatigue.   Ear/Nose/Mouth/Throat: No nasal congestion or sore throat.   Respiratory: No shortness of breath or cough.   Cardiovascular: No chest pain, palpitations, or peripheral edema.   Gastrointestinal: No nausea, vomiting, or abdominal pain.   Genitourinary: No dysuria.  Musculoskeletal: Patient has ongoing posterior neck pain that will radiate down her arms if she is driving too long her hands will cramp up. This will also wake her up at night. Pain to her cervical axial spine is more achy and deep and worse when she looks up and turns her neck laterally. Pain to her bilateral shoulder blades will radiate to bilateral hands is described as more sharp radiating sensation. This also interferes with her sleep. Her pain score will elevate with the aforementioned activities in the neck, scapula and hands to a 8-9/10.     Past Surgical History:   Procedure Laterality Date    ANKLE SURGERY Right     bilatteral cataracts removed with lens implants Bilateral     CARPAL TUNNEL RELEASE       SECTION      CHOLECYSTECTOMY      HERNIA REPAIR      HYSTERECTOMY      INJECTION N/A 2023    Procedure: Injection  coccygeal ligament injection;  Surgeon: Vicky Perez MD;  Location: [facility];  Service: Pain Management;  Laterality: N/A;   coccygeal ligament injection    INJECTION OF ANESTHETIC AGENT AROUND MEDIAL BRANCH NERVES INNERVATING CERVICAL FACET JOINT Bilateral 6/14/2023    Procedure: Block-nerve-medial branch-cervical;  Surgeon: Vicky Perez MD;  Location: [facility] OR;  Service: Pain Management;  Laterality: Bilateral;  Medial Branch Block.. C4-C6    INJECTION OF ANESTHETIC AGENT AROUND MEDIAL BRANCH NERVES INNERVATING LUMBAR FACET JOINT Bilateral 03/09/2023    Procedure: BLOCK, NERVE, FACET JOINT, LUMBAR, MEDIAL BRANCH Bilateral L3-5;  Surgeon: Vicky Perez MD;  Location: [facility] OR;  Service: Pain Management;  Laterality: Bilateral;  L3-5    INJECTION OF ANESTHETIC AGENT AROUND MEDIAL BRANCH NERVES INNERVATING LUMBAR FACET JOINT Bilateral 7/5/2023    Procedure: Block-nerve-medial branch-lumbar;  Surgeon: Vicky Perez MD;  Location: [facility] OR;  Service: Pain Management;  Laterality: Bilateral;  Facet Joint Injection, Bilateral Lumbar L5-S1    INSERTION OF IMPLANTABLE LOOP RECORDER N/A     nonfunctioning    neck fusion      SPINAL CORD STIMULATOR IMPLANT      TONSILLECTOMY      TOTAL KNEE REPLACEMENT USING COMPUTER NAVIGATION Bilateral         Past Medical History:   Diagnosis Date    Arthritis     Chronic back pain     GERD (gastroesophageal reflux disease)     Heart murmur     Herpes     HTN (hypertension)     Insomnia     OAB (overactive bladder)     Seasonal allergies     Syringomyelia         OBJECTIVE:    Vitals:    02/28/24 1045   BP: (!) 172/90   Pulse: 70   Temp: 98.3 °F (36.8 °C)             Physical Exam:   General: Well-developed, well-nourished.  Neuro: Alert and oriented x 3.  Psych: Normal mood and affect.  HEENT: Normocephalic. PERRLA EOM intact. Nose and throat clear.  Lungs: Clear to auscultation and percussion.  Heart: Regular rate and rhythm   Abdomen: Soft non-tender. Bowel sounds positive. No rebound tenderness.  Skin: No rashes or open wounds  Musculoskeletal:Neck:  Bilateral cervical paraspinal banding.  Limited  range motion and pain with lateral turning and extension.  Patient was able to raise bilateral arms parallel and became more painful as she tried to raise them above her head.  Equal handgrip bilaterally. Gen: No cyanosis or tenderness to palpation bilateral upper and lower extremities  Skin: Warm, pink, dry, no rashes, no lesions on the lumbar spine  Strength: 5/5 motor strength bilateral upper and lower extremities  ROM: Full ROM in bilateral knees without pain or instability.     Neuro:  Gait: no altalgic lean, normal toe and heel raise. Independent ambulator.  DTR's: 2+ in bilateral patellar,  Sensory: Intact to light touch bilateral  upper and lower extremities     Spine: Normal lordosis. No scoliosis  L-spine ROM: Full ROM to flexion, extension, bilateral rotation,   Straight Leg Raise:  Negative bilaterally  SI Joint: No tenderness to palpation bilaterall    ASSESSMENT:  1. DDD (degenerative disc disease), cervical    2. Cervical radiculitis       PLAN:  Plan for to proceed with interlaminar KAREN C7-T1 on 03/13/2024. . The patient has been given preoperative instructions and prescriptions for post-operative medication. Post-operative appointment is scheduled for 2 weeks.  Patient was instructed to hold diclofenac gel, Fish Oil and Omega 3,  and 81 mg ASA 7 days prior to procedure then resume postop.

## 2024-02-28 NOTE — H&P (VIEW-ONLY)
ADMISSION HISTORY & PHYSICAL    SUBJECTIVE:    CHIEF COMPLAINT: Neck Pain (Pre op procedure 3/13/24 C/O pain level 6, Taking pain meds)       History of Present Illness: 79 y.o. female presents today for preoperative evaluation for interlaminar KAREN C7-T1 on 2024. I reviewed the indications for procedure. The risks and benefits of the proposed and alternative treatments were discussed with the patient. Questions pertinent to the procedure were solicited and answered. No assurances were given. Informed consent was obtained. The patient expressed good understanding and wished to proceed with scheduling the procedure.     Review of Systems:   Constitutional: No fever, weakness, or fatigue.   Ear/Nose/Mouth/Throat: No nasal congestion or sore throat.   Respiratory: No shortness of breath or cough.   Cardiovascular: No chest pain, palpitations, or peripheral edema.   Gastrointestinal: No nausea, vomiting, or abdominal pain.   Genitourinary: No dysuria.  Musculoskeletal: Patient has ongoing posterior neck pain that will radiate down her arms if she is driving too long her hands will cramp up. This will also wake her up at night. Pain to her cervical axial spine is more achy and deep and worse when she looks up and turns her neck laterally. Pain to her bilateral shoulder blades will radiate to bilateral hands is described as more sharp radiating sensation. This also interferes with her sleep. Her pain score will elevate with the aforementioned activities in the neck, scapula and hands to a 8-9/10.     Past Surgical History:   Procedure Laterality Date    ANKLE SURGERY Right     bilatteral cataracts removed with lens implants Bilateral     CARPAL TUNNEL RELEASE       SECTION      CHOLECYSTECTOMY      HERNIA REPAIR      HYSTERECTOMY      INJECTION N/A 2023    Procedure: Injection  coccygeal ligament injection;  Surgeon: Vicky Perez MD;  Location: [facility];  Service: Pain Management;  Laterality: N/A;   coccygeal ligament injection    INJECTION OF ANESTHETIC AGENT AROUND MEDIAL BRANCH NERVES INNERVATING CERVICAL FACET JOINT Bilateral 6/14/2023    Procedure: Block-nerve-medial branch-cervical;  Surgeon: Vicky Perez MD;  Location: [facility] OR;  Service: Pain Management;  Laterality: Bilateral;  Medial Branch Block.. C4-C6    INJECTION OF ANESTHETIC AGENT AROUND MEDIAL BRANCH NERVES INNERVATING LUMBAR FACET JOINT Bilateral 03/09/2023    Procedure: BLOCK, NERVE, FACET JOINT, LUMBAR, MEDIAL BRANCH Bilateral L3-5;  Surgeon: Vicky Perez MD;  Location: [facility] OR;  Service: Pain Management;  Laterality: Bilateral;  L3-5    INJECTION OF ANESTHETIC AGENT AROUND MEDIAL BRANCH NERVES INNERVATING LUMBAR FACET JOINT Bilateral 7/5/2023    Procedure: Block-nerve-medial branch-lumbar;  Surgeon: Vicky Perez MD;  Location: [facility] OR;  Service: Pain Management;  Laterality: Bilateral;  Facet Joint Injection, Bilateral Lumbar L5-S1    INSERTION OF IMPLANTABLE LOOP RECORDER N/A     nonfunctioning    neck fusion      SPINAL CORD STIMULATOR IMPLANT      TONSILLECTOMY      TOTAL KNEE REPLACEMENT USING COMPUTER NAVIGATION Bilateral         Past Medical History:   Diagnosis Date    Arthritis     Chronic back pain     GERD (gastroesophageal reflux disease)     Heart murmur     Herpes     HTN (hypertension)     Insomnia     OAB (overactive bladder)     Seasonal allergies     Syringomyelia         OBJECTIVE:    Vitals:    02/28/24 1045   BP: (!) 172/90   Pulse: 70   Temp: 98.3 °F (36.8 °C)             Physical Exam:   General: Well-developed, well-nourished.  Neuro: Alert and oriented x 3.  Psych: Normal mood and affect.  HEENT: Normocephalic. PERRLA EOM intact. Nose and throat clear.  Lungs: Clear to auscultation and percussion.  Heart: Regular rate and rhythm   Abdomen: Soft non-tender. Bowel sounds positive. No rebound tenderness.  Skin: No rashes or open wounds  Musculoskeletal:Neck:  Bilateral cervical paraspinal banding.  Limited  range motion and pain with lateral turning and extension.  Patient was able to raise bilateral arms parallel and became more painful as she tried to raise them above her head.  Equal handgrip bilaterally. Gen: No cyanosis or tenderness to palpation bilateral upper and lower extremities  Skin: Warm, pink, dry, no rashes, no lesions on the lumbar spine  Strength: 5/5 motor strength bilateral upper and lower extremities  ROM: Full ROM in bilateral knees without pain or instability.     Neuro:  Gait: no altalgic lean, normal toe and heel raise. Independent ambulator.  DTR's: 2+ in bilateral patellar,  Sensory: Intact to light touch bilateral  upper and lower extremities     Spine: Normal lordosis. No scoliosis  L-spine ROM: Full ROM to flexion, extension, bilateral rotation,   Straight Leg Raise:  Negative bilaterally  SI Joint: No tenderness to palpation bilaterall    ASSESSMENT:  1. DDD (degenerative disc disease), cervical    2. Cervical radiculitis       PLAN:  Plan for to proceed with interlaminar KAREN C7-T1 on 03/13/2024. . The patient has been given preoperative instructions and prescriptions for post-operative medication. Post-operative appointment is scheduled for 2 weeks.  Patient was instructed to hold diclofenac gel, Fish Oil and Omega 3,  and 81 mg ASA 7 days prior to procedure then resume postop.

## 2024-03-07 ENCOUNTER — ANESTHESIA EVENT (OUTPATIENT)
Dept: SURGERY | Facility: HOSPITAL | Age: 79
End: 2024-03-07
Payer: MEDICARE

## 2024-03-13 ENCOUNTER — HOSPITAL ENCOUNTER (OUTPATIENT)
Facility: HOSPITAL | Age: 79
Discharge: HOME | End: 2024-03-13
Attending: ANESTHESIOLOGY | Admitting: ANESTHESIOLOGY
Payer: MEDICARE

## 2024-03-13 ENCOUNTER — ANESTHESIA (OUTPATIENT)
Dept: SURGERY | Facility: HOSPITAL | Age: 79
End: 2024-03-13
Payer: MEDICARE

## 2024-03-13 DIAGNOSIS — M54.2 CHRONIC NECK PAIN: ICD-10-CM

## 2024-03-13 DIAGNOSIS — G89.29 CHRONIC NECK PAIN: ICD-10-CM

## 2024-03-13 PROCEDURE — D9220A PRA ANESTHESIA: Mod: CRNA,,,

## 2024-03-13 PROCEDURE — 63600175 PHARM REV CODE 636 W HCPCS

## 2024-03-13 PROCEDURE — 37000008 HC ANESTHESIA 1ST 15 MINUTES: Performed by: ANESTHESIOLOGY

## 2024-03-13 PROCEDURE — A4216 STERILE WATER/SALINE, 10 ML: HCPCS | Performed by: ANESTHESIOLOGY

## 2024-03-13 PROCEDURE — D9220A PRA ANESTHESIA: Mod: ANES,,, | Performed by: STUDENT IN AN ORGANIZED HEALTH CARE EDUCATION/TRAINING PROGRAM

## 2024-03-13 PROCEDURE — 25000003 PHARM REV CODE 250

## 2024-03-13 PROCEDURE — 62321 NJX INTERLAMINAR CRV/THRC: CPT | Performed by: ANESTHESIOLOGY

## 2024-03-13 PROCEDURE — 63600175 PHARM REV CODE 636 W HCPCS: Performed by: ANESTHESIOLOGY

## 2024-03-13 PROCEDURE — 25000003 PHARM REV CODE 250: Performed by: ANESTHESIOLOGY

## 2024-03-13 PROCEDURE — 62321 NJX INTERLAMINAR CRV/THRC: CPT | Mod: ,,, | Performed by: ANESTHESIOLOGY

## 2024-03-13 RX ORDER — PROPOFOL 10 MG/ML
VIAL (ML) INTRAVENOUS
Status: DISCONTINUED | OUTPATIENT
Start: 2024-03-13 | End: 2024-03-13

## 2024-03-13 RX ORDER — LIDOCAINE HYDROCHLORIDE 10 MG/ML
INJECTION, SOLUTION EPIDURAL; INFILTRATION; INTRACAUDAL; PERINEURAL
Status: DISCONTINUED
Start: 2024-03-13 | End: 2024-03-13 | Stop reason: HOSPADM

## 2024-03-13 RX ORDER — LIDOCAINE HYDROCHLORIDE 10 MG/ML
INJECTION, SOLUTION EPIDURAL; INFILTRATION; INTRACAUDAL; PERINEURAL
Status: DISCONTINUED | OUTPATIENT
Start: 2024-03-13 | End: 2024-03-13 | Stop reason: HOSPADM

## 2024-03-13 RX ORDER — LIDOCAINE HYDROCHLORIDE 20 MG/ML
INJECTION, SOLUTION EPIDURAL; INFILTRATION; INTRACAUDAL; PERINEURAL
Status: DISCONTINUED | OUTPATIENT
Start: 2024-03-13 | End: 2024-03-13

## 2024-03-13 RX ORDER — BUPIVACAINE HYDROCHLORIDE 2.5 MG/ML
INJECTION, SOLUTION EPIDURAL; INFILTRATION; INTRACAUDAL
Status: DISCONTINUED
Start: 2024-03-13 | End: 2024-03-13 | Stop reason: WASHOUT

## 2024-03-13 RX ORDER — DEXAMETHASONE SODIUM PHOSPHATE 10 MG/ML
INJECTION INTRAMUSCULAR; INTRAVENOUS
Status: DISCONTINUED
Start: 2024-03-13 | End: 2024-03-13 | Stop reason: HOSPADM

## 2024-03-13 RX ORDER — DEXAMETHASONE SODIUM PHOSPHATE 10 MG/ML
INJECTION INTRAMUSCULAR; INTRAVENOUS
Status: DISCONTINUED | OUTPATIENT
Start: 2024-03-13 | End: 2024-03-13 | Stop reason: HOSPADM

## 2024-03-13 RX ORDER — LIDOCAINE HYDROCHLORIDE 10 MG/ML
INJECTION INFILTRATION; PERINEURAL
Status: DISCONTINUED
Start: 2024-03-13 | End: 2024-03-13 | Stop reason: WASHOUT

## 2024-03-13 RX ORDER — SODIUM CHLORIDE 9 MG/ML
INJECTION, SOLUTION INTRAMUSCULAR; INTRAVENOUS; SUBCUTANEOUS
Status: DISCONTINUED | OUTPATIENT
Start: 2024-03-13 | End: 2024-03-13 | Stop reason: HOSPADM

## 2024-03-13 RX ADMIN — SODIUM CHLORIDE, SODIUM GLUCONATE, SODIUM ACETATE, POTASSIUM CHLORIDE AND MAGNESIUM CHLORIDE: 526; 502; 368; 37; 30 INJECTION, SOLUTION INTRAVENOUS at 10:03

## 2024-03-13 RX ADMIN — PROPOFOL 50 MG: 10 INJECTION, EMULSION INTRAVENOUS at 10:03

## 2024-03-13 RX ADMIN — LIDOCAINE HYDROCHLORIDE 50 ML: 20 INJECTION, SOLUTION EPIDURAL; INFILTRATION; INTRACAUDAL; PERINEURAL at 10:03

## 2024-03-13 NOTE — OR NURSING
Patient returned to outpatient with tingling from her buttox to her ankles bilaterally. Dr. Perez was notified and had Sri Hopkins RN speak with patient. Patient soon stated that tingling had subsided.

## 2024-03-13 NOTE — ANESTHESIA PREPROCEDURE EVALUATION
2024  Elizabeth Bruno is a 79 y.o., female.    Chronic back pain    Other Medical History   Syringomyelia HTN (hypertension)   GERD (gastroesophageal reflux disease) Arthritis   OAB (overactive bladder) Heart murmur   Herpes Seasonal allergies   Insomnia      Surgical History  SPINAL CORD STIMULATOR IMPLANT TONSILLECTOMY    SECTION HYSTERECTOMY   CHOLECYSTECTOMY HERNIA REPAIR   CARPAL TUNNEL RELEASE TOTAL KNEE REPLACEMENT USING COMPUTER NAVIGATION   neck fusion ANKLE SURGERY   INJECTION OF ANESTHETIC AGENT AROUND MEDIAL BRANCH NERVES INNERVATING LUMBAR FACET JOINT bilatteral cataracts removed with lens implants   INSERTION OF IMPLANTABLE LOOP RECORDER INJECTION OF ANESTHETIC AGENT AROUND MEDIAL BRANCH NERVES INNERVATING CERVICAL FACET JOINT   INJECTION OF ANESTHETIC AGENT AROUND MEDIAL BRANCH NERVES INNERVATING LUMBAR FACET JOINT INJECTION     Tte  nl lvef, mild AS, mild AR, g1dd    Pre-op Assessment    I have reviewed the Patient Summary Reports.    I have reviewed the NPO Status.   I have reviewed the Medications.     Review of Systems  Anesthesia Hx:               Denies Personal Hx of Anesthesia complications.                    Social:  Non-Smoker       Cardiovascular:     Hypertension                Functional Capacity good / => 4 METS    Aortic Stenosis (AS), moderate                          Hepatic/GI:     GERD                 Physical Exam  General: Well nourished, Cooperative, Alert and Oriented    Airway:  Mallampati: II   Mouth Opening: Normal  TM Distance: Normal  Tongue: Normal  Neck ROM: Normal ROM    Dental:  Intact        Anesthesia Plan  Type of Anesthesia, risks & benefits discussed:    Anesthesia Type: MAC  Intra-op Monitoring Plan: Standard ASA Monitors  Induction:  IV  Informed Consent: Informed consent signed with the Patient and all parties understand the risks  and agree with anesthesia plan.  All questions answered.   ASA Score: 3  Day of Surgery Review of History & Physical: H&P Update referred to the surgeon/provider.    Ready For Surgery From Anesthesia Perspective.     .

## 2024-03-13 NOTE — DISCHARGE SUMMARY
[facility] - Periop Services  Discharge Note  Short Stay    Procedure(s) (LRB):  INJECTION, STEROID, SPINE, CERVICAL, EPIDURAL  C7-T1 (N/A)      OUTCOME: Patient tolerated treatment/procedure well without complication and is now ready for discharge.    DISPOSITION: Home or Self Care    FINAL DIAGNOSIS:  <principal problem not specified>    FOLLOWUP: In clinic    DISCHARGE INSTRUCTIONS:  No discharge procedures on file.     TIME SPENT ON DISCHARGE: 5 minutes

## 2024-03-13 NOTE — TRANSFER OF CARE
"Anesthesia Transfer of Care Note    Patient: Elizabeth Bruno    Procedure(s) Performed: Procedure(s) (LRB):  INJECTION, STEROID, SPINE, CERVICAL, EPIDURAL  C7-T1 (N/A)    Patient location: OPS    Anesthesia Type: MAC    Transport from OR: Transported from OR on room air with adequate spontaneous ventilation    Post pain: adequate analgesia    Post assessment: no apparent anesthetic complications and tolerated procedure well    Post vital signs: stable    Level of consciousness: awake, alert and oriented    Nausea/Vomiting: no nausea/vomiting    Complications: none    Transfer of care protocol was followed      Last vitals: Visit Vitals  /69   Pulse 64   Temp 36.5 °C (97.7 °F) (Tympanic)   Resp 18   Ht 5' 2" (1.575 m)   Wt 63.5 kg (139 lb 15.9 oz)   SpO2 95%   Breastfeeding No   BMI 25.60 kg/m²     "

## 2024-03-15 VITALS
HEART RATE: 59 BPM | HEIGHT: 62 IN | DIASTOLIC BLOOD PRESSURE: 79 MMHG | OXYGEN SATURATION: 92 % | SYSTOLIC BLOOD PRESSURE: 147 MMHG | WEIGHT: 140 LBS | RESPIRATION RATE: 18 BRPM | BODY MASS INDEX: 25.76 KG/M2 | TEMPERATURE: 98 F

## 2024-03-15 NOTE — ANESTHESIA POSTPROCEDURE EVALUATION
Anesthesia Post Evaluation    Patient: Elizabeth Bruno    Procedure(s) Performed: Procedure(s) (LRB):  INJECTION, STEROID, SPINE, CERVICAL, EPIDURAL  C7-T1 (N/A)    Final Anesthesia Type: general      Patient location during evaluation: PACU  Patient participation: Yes- Able to Participate  Level of consciousness: awake and alert  Post-procedure vital signs: reviewed and stable  Pain management: adequate  Airway patency: patent    PONV status at discharge: No PONV  Anesthetic complications: no      Respiratory status: unassisted  Hydration status: euvolemic  Follow-up not needed.              Vitals Value Taken Time   /79 03/13/24 1141   Temp nl 03/15/24 0755   Pulse 61 03/13/24 1146   Resp 18 03/13/24 1103   SpO2 96 % 03/13/24 1146   Vitals shown include unvalidated device data.      No case tracking events are documented in the log.      Pain/Rene Score: No data recorded

## 2024-03-23 ENCOUNTER — PATIENT MESSAGE (OUTPATIENT)
Dept: PAIN MEDICINE | Facility: CLINIC | Age: 79
End: 2024-03-23
Payer: MEDICARE

## 2024-03-25 NOTE — TELEPHONE ENCOUNTER
Patient is chronically on statin.will continue for now. Last Lipid Panel:   Lab Results   Component Value Date    CHOL 124 07/14/2022    HDL 43 07/14/2022    LDLCALC 54.4 (L) 07/14/2022    TRIG 133 07/14/2022    CHOLHDL 34.7 07/14/2022   Plan:  -Continue home medication  -low fat/low calorie diet     Please see note

## 2024-03-25 NOTE — TELEPHONE ENCOUNTER
Relayed to patient I talked to Dr. Mahin juarez and she would prefer patient trying a cervical epidural steroid injection as that may seat down in those areas and help her pain to her posterior neck.  We can revisit other procedures during her postop visit.  Thanks

## 2024-03-27 ENCOUNTER — OFFICE VISIT (OUTPATIENT)
Dept: PAIN MEDICINE | Facility: CLINIC | Age: 79
End: 2024-03-27
Payer: MEDICARE

## 2024-03-27 VITALS
HEIGHT: 62 IN | WEIGHT: 139 LBS | DIASTOLIC BLOOD PRESSURE: 87 MMHG | BODY MASS INDEX: 25.58 KG/M2 | HEART RATE: 74 BPM | SYSTOLIC BLOOD PRESSURE: 139 MMHG

## 2024-03-27 DIAGNOSIS — M54.12 CERVICAL RADICULOPATHY: ICD-10-CM

## 2024-03-27 DIAGNOSIS — M50.30 DDD (DEGENERATIVE DISC DISEASE), CERVICAL: ICD-10-CM

## 2024-03-27 DIAGNOSIS — G89.4 CHRONIC PAIN SYNDROME: ICD-10-CM

## 2024-03-27 DIAGNOSIS — M54.2 CERVICALGIA: ICD-10-CM

## 2024-03-27 DIAGNOSIS — M53.3 COCCYDYNIA: Primary | ICD-10-CM

## 2024-03-27 PROCEDURE — 3075F SYST BP GE 130 - 139MM HG: CPT | Mod: CPTII,,, | Performed by: NURSE PRACTITIONER

## 2024-03-27 PROCEDURE — 1101F PT FALLS ASSESS-DOCD LE1/YR: CPT | Mod: CPTII,,, | Performed by: NURSE PRACTITIONER

## 2024-03-27 PROCEDURE — 3079F DIAST BP 80-89 MM HG: CPT | Mod: CPTII,,, | Performed by: NURSE PRACTITIONER

## 2024-03-27 PROCEDURE — 99214 OFFICE O/P EST MOD 30 MIN: CPT | Mod: ,,, | Performed by: NURSE PRACTITIONER

## 2024-03-27 PROCEDURE — 1159F MED LIST DOCD IN RCRD: CPT | Mod: CPTII,,, | Performed by: NURSE PRACTITIONER

## 2024-03-27 PROCEDURE — 1125F AMNT PAIN NOTED PAIN PRSNT: CPT | Mod: CPTII,,, | Performed by: NURSE PRACTITIONER

## 2024-03-27 PROCEDURE — 3288F FALL RISK ASSESSMENT DOCD: CPT | Mod: CPTII,,, | Performed by: NURSE PRACTITIONER

## 2024-03-27 NOTE — PROGRESS NOTES
Subjective:      Patient ID: Elizabeth Bruno is a 79 y.o. female.    Chief Complaint: Cervical Spine Pain (C-spine) (Post op procedure 3/13/24 C/O pain level 4, states procedure helped states can turn head better, hasn't taken pain meds last few days. Needs refill on Zonisamide.)    Referred by: No ref. provider found     HPI:  This is a pleasant 79-year-old female patient presenting as a postoperative follow-up for cervicalgia with cervical degenerative disc disease and radiculitis after receiving an interlaminar cervical epidural steroid injection C7-T1 on 03/13/2024.Her overall pain score to neck and arms today is 4/10  Patient was satisfied with her pain control to her neck and states she can turn her head better and has not had to take pain medications of the last few days. Her ROM and pain with driving has notably improved. However, she has notable pain with prolonged sitting to her coccyx. She would like to repeat a Coccygeal ligament injection that she received in December 2023. Her Coccyx pain will elevate to8/10 with prolonged sitting and driving.  She continues taking Gabapentin 600 mg TID, Lidoderm patch 12 hours on and 12 hours off., Norco as needed and Robaxin 500 mg as needed.     During her last office visit she wanted to focus on her neck and arm pain that had returned without injuries.  Patient reported ongoing posterior neck pain that will radiate down her arms if she is driving too long her hands will cramp up.  This will also wake her up at night.  Pain to her cervical axial spine is more achy and deep and worse when she looks up and turns her neck laterally.  Pain to her bilateral shoulder blades will radiate to bilateral hands is described as more sharp radiating sensation.  This also interferes with her sleep.  Her pain score will elevate with the aforementioned activities in the neck, scapula and hands to a 8-9/10.  In the past she had completed a cervical diagnostic medial branch block  "and unfortunately was unable to finish the 2nd series of cervical diagnostic medial branch blocks after she received at least 80% relief of pain to the area.  She was traveling during that time.       She denies urinary retention, fecal incontinence, spasticity saddle anesthesia.  She does have pertinent past surgical history of neurosurgical fusion C5-7.  She also has a implanted spinal cord stimulator and loop recorder.  No history of a defibrillator or pacemaker implant.       Vital signs:   Vitals:    03/27/24 1110 03/27/24 1113   BP:  139/87   Pulse:  74   Weight: 63 kg (139 lb) 63 kg (139 lb)   Height: 5' 2" (1.575 m) 5' 2" (1.575 m)   PainSc:    4     Body mass index is 25.42 kg/m².  Pain Disability Index (PDI): 13       Interventional Pain History  03/13/2024:  Interlaminar KAREN C7-T1  12/21/2023:  Coccygeal ligament injection  07/2023:  Bilateral L5-S1 facet injections    ROS: Neck and arm pain   CT Cervical Spine 2019:      FINDINGS:       Vertebral bodies and posterior elements: Normal vertebral body heights. Anterior fusion hardware noted at C5-C7.     Cord signal: Normal     Marrow signal: Minimal degenerative edema surrounds a few disc spaces.     Prevertebral and paraspinal soft tissues: Normal     Other: Multilevel anterior flowing ossification keeping with idiopathic skeletal hyperostosis.     C2-3: Congenitally narrowed canal, small disc protrusion, mild canal narrowing. Mild to moderate right and mild left foraminal narrowing.     C3-4: Moderate disc space degeneration. Small disc osteophyte complex. Congenitally narrowed canal. Moderate canal narrowing. Moderate left and moderate right foraminal narrowing.     C4-5: Uncovertebral arthropathy. Congenitally narrowed canal. Mild canal narrowing. Moderate right facet arthropathy. Moderate left and severe right foraminal narrowing.     C5-6: Fused disc space. No disc protrusion or canal narrowing. Moderate left foraminal narrowing, otherwise normal. "     C6-7: Partially fused disc space. Small disc osteophyte complex, congenitally narrowed canal, mild to moderate canal narrowing, severe left and moderate right foraminal narrowing.     C7-T1: Moderate disc space narrowing. No disc protrusion or canal narrowing. Normal foramen.   Objective:      Objective   Physical Exam  General: Well developed; normal weight A&O x 3; No anxiety/depression; NAD  Mental Status: Oriented to person, palce and time. Displays appropriate mood & affect.  Head: Norm cephalic and atraumatic  Neck:  Bilateral cervical paraspinal banding.  Limited range motion and pain with lateral turning and extension.  Patient was able to raise bilateral arms parallel and became more painful as she tried to raise them above her head.  Equal handgrip bilaterally.  Eyes: normal conjunctiva, normal lids, normal pupils  ENT and mouth: normal external ear, nose, and no lesions noted on the lips.  Respiratory: Symmetrical, Unlabored. No dyspnea  CV: normal rhythm and rate. No peripheral edema.   Abdomen: Non-distended     Extremities:  Gen: No cyanosis or tenderness to palpation bilateral upper and lower extremities  Skin: Warm, pink, dry, no rashes, no lesions on the lumbar spine  Strength: 5/5 motor strength bilateral upper and lower extremities  ROM: Full ROM in bilateral knees without pain or instability.     Neuro:  Gait: no altalgic lean, normal toe and heel raise. Independent ambulator.  DTR's: 2+ in bilateral patellar,  Sensory: Intact to light touch bilateral  upper and lower extremities     Spine: Normal lordosis. No scoliosis  L-spine ROM: Full ROM to flexion, extension, bilateral rotation,   Straight Leg Raise:  Negative bilaterally  SI Joint: No tenderness to palpation bilaterally.             Objective:          Physical Exam    General: Well developed; normal weight A&O x 3; No anxiety/depression; NAD  Mental Status: Oriented to person, palce and time. Displays appropriate mood & affect.  Head:  Norm cephalic and atraumatic  Neck:  Bilateral cervical paraspinal banding.  Limited range motion and pain with lateral turning and extension.  Patient was able to raise bilateral arms parallel and became more painful as she tried to raise them above her head.  Equal handgrip bilaterally.  Eyes: normal conjunctiva, normal lids, normal pupils  ENT and mouth: normal external ear, nose, and no lesions noted on the lips.  Respiratory: Symmetrical, Unlabored. No dyspnea  CV: normal rhythm and rate. No peripheral edema.   Abdomen: Non-distended     Extremities:  Gen: No cyanosis or tenderness to palpation bilateral upper and lower extremities  Skin: Warm, pink, dry, no rashes, no lesions on the lumbar spine  Strength: 5/5 motor strength bilateral upper and lower extremities  ROM: Full ROM in bilateral knees without pain or instability.     Neuro:  Gait: no altalgic lean, normal toe and heel raise. Independent ambulator.  DTR's: 2+ in bilateral patellar,  Sensory: Intact to light touch bilateral  upper and lower extremities     Spine: Normal lordosis. No scoliosis  L-spine ROM: Full ROM to flexion, extension, bilateral rotation,   Straight Leg Raise:  Negative bilaterally  SI Joint: No tenderness to palpation bilaterally.              Assessment:     This is a pleasant 79-year-old female patient presenting as a postoperative follow-up for cervicalgia with cervical degenerative disc disease and radiculitis after receiving an interlaminar cervical epidural steroid injection C7-T1 on 03/13/2024.Her overall pain score to neck and arms today is 4/10  Patient was satisfied with her pain control to her neck and states she can turn her head better and has not had to take pain medications of the last few days. Her ROM and pain with driving has notably improved. However, she has notable pain with prolonged sitting to her coccyx. She would like to repeat a Coccygeal ligament injection that she received in December 2023. Her Coccyx  pain will elevate to8/10 with prolonged sitting and driving.  She continues taking Gabapentin 600 mg TID, Lidoderm patch 12 hours on and 12 hours off., Norco as needed and Robaxin 500 mg as needed.     During her last office visit she wanted to focus on her neck and arm pain that had returned without injuries.  Patient reported ongoing posterior neck pain that will radiate down her arms if she is driving too long her hands will cramp up.  This will also wake her up at night.  Pain to her cervical axial spine is more achy and deep and worse when she looks up and turns her neck laterally.  Pain to her bilateral shoulder blades will radiate to bilateral hands is described as more sharp radiating sensation.  This also interferes with her sleep.  Her pain score will elevate with the aforementioned activities in the neck, scapula and hands to a 8-9/10.  In the past she had completed a cervical diagnostic medial branch block and unfortunately was unable to finish the 2nd series of cervical diagnostic medial branch blocks after she received at least 80% relief of pain to the area.  She was traveling during that time.       She denies urinary retention, fecal incontinence, spasticity saddle anesthesia.  She does have pertinent past surgical history of neurosurgical fusion C5-7.  She also has a implanted spinal cord stimulator and loop recorder.  No history of a defibrillator or pacemaker implant.    Plan of care:    Coccygeal ligament injection request submitted.  Patient to follow up post op    Encounter Diagnoses   Name Primary?    Cervicalgia Yes    DDD (degenerative disc disease), cervical     Cervical radiculopathy          Plan:       Elizabeth was seen today for cervical spine pain (c-spine).    Diagnoses and all orders for this visit:    Cervicalgia    DDD (degenerative disc disease), cervical    Cervical radiculopathy             Past Medical History:   Diagnosis Date    Arthritis     Chronic back pain     GERD  (gastroesophageal reflux disease)     Heart murmur     Herpes     HTN (hypertension)     Insomnia     OAB (overactive bladder)     Seasonal allergies     Syringomyelia        Past Surgical History:   Procedure Laterality Date    ANKLE SURGERY Right     bilatteral cataracts removed with lens implants Bilateral     CARPAL TUNNEL RELEASE       SECTION      CHOLECYSTECTOMY      EPIDURAL STEROID INJECTION INTO CERVICAL SPINE N/A 3/13/2024    Procedure: INJECTION, STEROID, SPINE, CERVICAL, EPIDURAL  C7-T1;  Surgeon: Vicky Perez MD;  Location: [facility] OR;  Service: Pain Management;  Laterality: N/A;  KAREN C7-T1    HERNIA REPAIR      HYSTERECTOMY      INJECTION N/A 2023    Procedure: Injection  coccygeal ligament injection;  Surgeon: Vicky Perez MD;  Location: [facility] OR;  Service: Pain Management;  Laterality: N/A;  coccygeal ligament injection    INJECTION OF ANESTHETIC AGENT AROUND MEDIAL BRANCH NERVES INNERVATING CERVICAL FACET JOINT Bilateral 2023    Procedure: Block-nerve-medial branch-cervical;  Surgeon: Vicky Perez MD;  Location: [facility] OR;  Service: Pain Management;  Laterality: Bilateral;  Medial Branch Block.. C4-C6    INJECTION OF ANESTHETIC AGENT AROUND MEDIAL BRANCH NERVES INNERVATING LUMBAR FACET JOINT Bilateral 2023    Procedure: BLOCK, NERVE, FACET JOINT, LUMBAR, MEDIAL BRANCH Bilateral L3-5;  Surgeon: Vicky Perez MD;  Location: [facility] OR;  Service: Pain Management;  Laterality: Bilateral;  L3-5    INJECTION OF ANESTHETIC AGENT AROUND MEDIAL BRANCH NERVES INNERVATING LUMBAR FACET JOINT Bilateral 2023    Procedure: Block-nerve-medial branch-lumbar;  Surgeon: Vicky Perez MD;  Location: [facility] OR;  Service: Pain Management;  Laterality: Bilateral;  Facet Joint Injection, Bilateral Lumbar L5-S1    INSERTION OF IMPLANTABLE LOOP RECORDER N/A     nonfunctioning    neck fusion      SPINAL CORD STIMULATOR IMPLANT      TONSILLECTOMY      TOTAL KNEE REPLACEMENT USING  COMPUTER NAVIGATION Bilateral        Family History   Problem Relation Age of Onset    Heart disease Mother     Heart disease Father        Social History     Socioeconomic History    Marital status:    Tobacco Use    Smoking status: Never    Smokeless tobacco: Never   Substance and Sexual Activity    Alcohol use: Not Currently    Drug use: Never    Sexual activity: Yes       Current Outpatient Medications   Medication Sig Dispense Refill    ascorbic acid, vitamin C, (VITAMIN C) 1000 MG tablet Take 1,000 mg by mouth once daily.      aspirin (ECOTRIN) 81 MG EC tablet Take 81 mg by mouth once daily.      B-complex with vitamin C (Z-BEC OR EQUIV) tablet Take 1 tablet by mouth once daily.      calcium carbonate (OS-CARI) 500 mg calcium (1,250 mg) tablet Take by mouth once daily.      calcium-vitamin D 600 mg-10 mcg (400 unit) Tab Take 1 tablet by mouth once daily.      carboxymethylcellulose (REFRESH PLUS) 0.5 % Dpet Apply 1 drop to eye daily as needed.      cetirizine (ZYRTEC) 10 MG tablet Take 10 mg by mouth once daily.      diclofenac sodium (VOLTAREN) 1 % Gel Apply topically daily as needed.      EScitalopram oxalate (LEXAPRO) 20 MG tablet Take 20 mg by mouth nightly.      famotidine (PEPCID) 40 MG tablet Take 40 mg by mouth nightly.      fluticasone propionate (FLONASE) 50 mcg/actuation nasal spray 1 spray by Each Nostril route 2 (two) times a day.      furosemide (LASIX) 20 MG tablet Take 20 mg by mouth once daily.      gabapentin (NEURONTIN) 600 MG tablet TAKE 1 TABLET BY MOUTH THREE TIMES A DAY 90 tablet 2    glucosamine/chondr rivera A sod (OSTEO BI-FLEX ORAL) Take 1 tablet by mouth Daily.      HYDROcodone-acetaminophen (NORCO) 5-325 mg per tablet Take 1 tablet by mouth every 8 (eight) hours as needed.      hyoscyamine (ANASPAZ,LEVSIN) 0.125 mg Tab Take 125 mcg by mouth 2 (two) times daily.      LIDOcaine (LIDODERM) 5 % Apply 1 patch topically per day as needed for pain. 30 patch 1    methocarbamoL (ROBAXIN)  500 MG Tab Take 500 mg by mouth.      multivitamin with folic acid 400 mcg Tab Take 1 tablet by mouth once daily.      MYRBETRIQ 25 mg Tb24 ER tablet Take 25 mg by mouth once daily.      omega 3-dha-epa-fish oil 1,000 mg (120 mg-180 mg) Cap Take 1 capsule by mouth once daily.      pantoprazole (PROTONIX) 40 MG tablet Take 40 mg by mouth 2 (two) times daily.      rosuvastatin (CRESTOR) 5 MG tablet Take 5 mg by mouth every evening.      traZODone (DESYREL) 50 MG tablet Take 50 mg by mouth nightly.      turmeric root extract 500 mg Cap Take 1 capsule by mouth once daily.      valACYclovir (VALTREX) 500 MG tablet Take 500 mg by mouth once daily.      VITAMIN B COMPLEX ORAL Take 1 tablet by mouth every morning.      vitamin E 400 UNIT capsule Take 400 Units by mouth once daily.      zonisamide (ZONEGRAN) 100 MG Cap Take 2 capsules (200 mg total) by mouth nightly. 180 capsule 1     No current facility-administered medications for this visit.       Review of patient's allergies indicates:   Allergen Reactions    Tizanidine Shortness Of Breath     Stops breathing    Pollen extracts Other (See Comments)

## 2024-03-27 NOTE — H&P (VIEW-ONLY)
Subjective:      Patient ID: Elizabeth Bruno is a 79 y.o. female.    Chief Complaint: Cervical Spine Pain (C-spine) (Post op procedure 3/13/24 C/O pain level 4, states procedure helped states can turn head better, hasn't taken pain meds last few days. Needs refill on Zonisamide.)    Referred by: No ref. provider found     HPI:  This is a pleasant 79-year-old female patient presenting as a postoperative follow-up for cervicalgia with cervical degenerative disc disease and radiculitis after receiving an interlaminar cervical epidural steroid injection C7-T1 on 03/13/2024.Her overall pain score to neck and arms today is 4/10  Patient was satisfied with her pain control to her neck and states she can turn her head better and has not had to take pain medications of the last few days. Her ROM and pain with driving has notably improved. However, she has notable pain with prolonged sitting to her coccyx. She would like to repeat a Coccygeal ligament injection that she received in December 2023. Her Coccyx pain will elevate to8/10 with prolonged sitting and driving.  She continues taking Gabapentin 600 mg TID, Lidoderm patch 12 hours on and 12 hours off., Norco as needed and Robaxin 500 mg as needed.     During her last office visit she wanted to focus on her neck and arm pain that had returned without injuries.  Patient reported ongoing posterior neck pain that will radiate down her arms if she is driving too long her hands will cramp up.  This will also wake her up at night.  Pain to her cervical axial spine is more achy and deep and worse when she looks up and turns her neck laterally.  Pain to her bilateral shoulder blades will radiate to bilateral hands is described as more sharp radiating sensation.  This also interferes with her sleep.  Her pain score will elevate with the aforementioned activities in the neck, scapula and hands to a 8-9/10.  In the past she had completed a cervical diagnostic medial branch block  "and unfortunately was unable to finish the 2nd series of cervical diagnostic medial branch blocks after she received at least 80% relief of pain to the area.  She was traveling during that time.       She denies urinary retention, fecal incontinence, spasticity saddle anesthesia.  She does have pertinent past surgical history of neurosurgical fusion C5-7.  She also has a implanted spinal cord stimulator and loop recorder.  No history of a defibrillator or pacemaker implant.       Vital signs:   Vitals:    03/27/24 1110 03/27/24 1113   BP:  139/87   Pulse:  74   Weight: 63 kg (139 lb) 63 kg (139 lb)   Height: 5' 2" (1.575 m) 5' 2" (1.575 m)   PainSc:    4     Body mass index is 25.42 kg/m².  Pain Disability Index (PDI): 13       Interventional Pain History  03/13/2024:  Interlaminar KAREN C7-T1  12/21/2023:  Coccygeal ligament injection  07/2023:  Bilateral L5-S1 facet injections    ROS: Neck and arm pain   CT Cervical Spine 2019:      FINDINGS:       Vertebral bodies and posterior elements: Normal vertebral body heights. Anterior fusion hardware noted at C5-C7.     Cord signal: Normal     Marrow signal: Minimal degenerative edema surrounds a few disc spaces.     Prevertebral and paraspinal soft tissues: Normal     Other: Multilevel anterior flowing ossification keeping with idiopathic skeletal hyperostosis.     C2-3: Congenitally narrowed canal, small disc protrusion, mild canal narrowing. Mild to moderate right and mild left foraminal narrowing.     C3-4: Moderate disc space degeneration. Small disc osteophyte complex. Congenitally narrowed canal. Moderate canal narrowing. Moderate left and moderate right foraminal narrowing.     C4-5: Uncovertebral arthropathy. Congenitally narrowed canal. Mild canal narrowing. Moderate right facet arthropathy. Moderate left and severe right foraminal narrowing.     C5-6: Fused disc space. No disc protrusion or canal narrowing. Moderate left foraminal narrowing, otherwise normal. "     C6-7: Partially fused disc space. Small disc osteophyte complex, congenitally narrowed canal, mild to moderate canal narrowing, severe left and moderate right foraminal narrowing.     C7-T1: Moderate disc space narrowing. No disc protrusion or canal narrowing. Normal foramen.   Objective:      Objective   Physical Exam  General: Well developed; normal weight A&O x 3; No anxiety/depression; NAD  Mental Status: Oriented to person, palce and time. Displays appropriate mood & affect.  Head: Norm cephalic and atraumatic  Neck:  Bilateral cervical paraspinal banding.  Limited range motion and pain with lateral turning and extension.  Patient was able to raise bilateral arms parallel and became more painful as she tried to raise them above her head.  Equal handgrip bilaterally.  Eyes: normal conjunctiva, normal lids, normal pupils  ENT and mouth: normal external ear, nose, and no lesions noted on the lips.  Respiratory: Symmetrical, Unlabored. No dyspnea  CV: normal rhythm and rate. No peripheral edema.   Abdomen: Non-distended     Extremities:  Gen: No cyanosis or tenderness to palpation bilateral upper and lower extremities  Skin: Warm, pink, dry, no rashes, no lesions on the lumbar spine  Strength: 5/5 motor strength bilateral upper and lower extremities  ROM: Full ROM in bilateral knees without pain or instability.     Neuro:  Gait: no altalgic lean, normal toe and heel raise. Independent ambulator.  DTR's: 2+ in bilateral patellar,  Sensory: Intact to light touch bilateral  upper and lower extremities     Spine: Normal lordosis. No scoliosis  L-spine ROM: Full ROM to flexion, extension, bilateral rotation,   Straight Leg Raise:  Negative bilaterally  SI Joint: No tenderness to palpation bilaterally.             Objective:          Physical Exam    General: Well developed; normal weight A&O x 3; No anxiety/depression; NAD  Mental Status: Oriented to person, palce and time. Displays appropriate mood & affect.  Head:  Norm cephalic and atraumatic  Neck:  Bilateral cervical paraspinal banding.  Limited range motion and pain with lateral turning and extension.  Patient was able to raise bilateral arms parallel and became more painful as she tried to raise them above her head.  Equal handgrip bilaterally.  Eyes: normal conjunctiva, normal lids, normal pupils  ENT and mouth: normal external ear, nose, and no lesions noted on the lips.  Respiratory: Symmetrical, Unlabored. No dyspnea  CV: normal rhythm and rate. No peripheral edema.   Abdomen: Non-distended     Extremities:  Gen: No cyanosis or tenderness to palpation bilateral upper and lower extremities  Skin: Warm, pink, dry, no rashes, no lesions on the lumbar spine  Strength: 5/5 motor strength bilateral upper and lower extremities  ROM: Full ROM in bilateral knees without pain or instability.     Neuro:  Gait: no altalgic lean, normal toe and heel raise. Independent ambulator.  DTR's: 2+ in bilateral patellar,  Sensory: Intact to light touch bilateral  upper and lower extremities     Spine: Normal lordosis. No scoliosis  L-spine ROM: Full ROM to flexion, extension, bilateral rotation,   Straight Leg Raise:  Negative bilaterally  SI Joint: No tenderness to palpation bilaterally.              Assessment:     This is a pleasant 79-year-old female patient presenting as a postoperative follow-up for cervicalgia with cervical degenerative disc disease and radiculitis after receiving an interlaminar cervical epidural steroid injection C7-T1 on 03/13/2024.Her overall pain score to neck and arms today is 4/10  Patient was satisfied with her pain control to her neck and states she can turn her head better and has not had to take pain medications of the last few days. Her ROM and pain with driving has notably improved. However, she has notable pain with prolonged sitting to her coccyx. She would like to repeat a Coccygeal ligament injection that she received in December 2023. Her Coccyx  pain will elevate to8/10 with prolonged sitting and driving.  She continues taking Gabapentin 600 mg TID, Lidoderm patch 12 hours on and 12 hours off., Norco as needed and Robaxin 500 mg as needed.     During her last office visit she wanted to focus on her neck and arm pain that had returned without injuries.  Patient reported ongoing posterior neck pain that will radiate down her arms if she is driving too long her hands will cramp up.  This will also wake her up at night.  Pain to her cervical axial spine is more achy and deep and worse when she looks up and turns her neck laterally.  Pain to her bilateral shoulder blades will radiate to bilateral hands is described as more sharp radiating sensation.  This also interferes with her sleep.  Her pain score will elevate with the aforementioned activities in the neck, scapula and hands to a 8-9/10.  In the past she had completed a cervical diagnostic medial branch block and unfortunately was unable to finish the 2nd series of cervical diagnostic medial branch blocks after she received at least 80% relief of pain to the area.  She was traveling during that time.       She denies urinary retention, fecal incontinence, spasticity saddle anesthesia.  She does have pertinent past surgical history of neurosurgical fusion C5-7.  She also has a implanted spinal cord stimulator and loop recorder.  No history of a defibrillator or pacemaker implant.    Plan of care:    Coccygeal ligament injection request submitted.  Patient to follow up post op    Encounter Diagnoses   Name Primary?    Cervicalgia Yes    DDD (degenerative disc disease), cervical     Cervical radiculopathy          Plan:       Elizabeth was seen today for cervical spine pain (c-spine).    Diagnoses and all orders for this visit:    Cervicalgia    DDD (degenerative disc disease), cervical    Cervical radiculopathy             Past Medical History:   Diagnosis Date    Arthritis     Chronic back pain     GERD  (gastroesophageal reflux disease)     Heart murmur     Herpes     HTN (hypertension)     Insomnia     OAB (overactive bladder)     Seasonal allergies     Syringomyelia        Past Surgical History:   Procedure Laterality Date    ANKLE SURGERY Right     bilatteral cataracts removed with lens implants Bilateral     CARPAL TUNNEL RELEASE       SECTION      CHOLECYSTECTOMY      EPIDURAL STEROID INJECTION INTO CERVICAL SPINE N/A 3/13/2024    Procedure: INJECTION, STEROID, SPINE, CERVICAL, EPIDURAL  C7-T1;  Surgeon: iVcky Perez MD;  Location: [facility] OR;  Service: Pain Management;  Laterality: N/A;  KAREN C7-T1    HERNIA REPAIR      HYSTERECTOMY      INJECTION N/A 2023    Procedure: Injection  coccygeal ligament injection;  Surgeon: Vicky Perez MD;  Location: [facility] OR;  Service: Pain Management;  Laterality: N/A;  coccygeal ligament injection    INJECTION OF ANESTHETIC AGENT AROUND MEDIAL BRANCH NERVES INNERVATING CERVICAL FACET JOINT Bilateral 2023    Procedure: Block-nerve-medial branch-cervical;  Surgeon: Vicky Perez MD;  Location: [facility] OR;  Service: Pain Management;  Laterality: Bilateral;  Medial Branch Block.. C4-C6    INJECTION OF ANESTHETIC AGENT AROUND MEDIAL BRANCH NERVES INNERVATING LUMBAR FACET JOINT Bilateral 2023    Procedure: BLOCK, NERVE, FACET JOINT, LUMBAR, MEDIAL BRANCH Bilateral L3-5;  Surgeon: Vicky Perez MD;  Location: [facility] OR;  Service: Pain Management;  Laterality: Bilateral;  L3-5    INJECTION OF ANESTHETIC AGENT AROUND MEDIAL BRANCH NERVES INNERVATING LUMBAR FACET JOINT Bilateral 2023    Procedure: Block-nerve-medial branch-lumbar;  Surgeon: Vicky Perez MD;  Location: [facility] OR;  Service: Pain Management;  Laterality: Bilateral;  Facet Joint Injection, Bilateral Lumbar L5-S1    INSERTION OF IMPLANTABLE LOOP RECORDER N/A     nonfunctioning    neck fusion      SPINAL CORD STIMULATOR IMPLANT      TONSILLECTOMY      TOTAL KNEE REPLACEMENT USING  COMPUTER NAVIGATION Bilateral        Family History   Problem Relation Age of Onset    Heart disease Mother     Heart disease Father        Social History     Socioeconomic History    Marital status:    Tobacco Use    Smoking status: Never    Smokeless tobacco: Never   Substance and Sexual Activity    Alcohol use: Not Currently    Drug use: Never    Sexual activity: Yes       Current Outpatient Medications   Medication Sig Dispense Refill    ascorbic acid, vitamin C, (VITAMIN C) 1000 MG tablet Take 1,000 mg by mouth once daily.      aspirin (ECOTRIN) 81 MG EC tablet Take 81 mg by mouth once daily.      B-complex with vitamin C (Z-BEC OR EQUIV) tablet Take 1 tablet by mouth once daily.      calcium carbonate (OS-CARI) 500 mg calcium (1,250 mg) tablet Take by mouth once daily.      calcium-vitamin D 600 mg-10 mcg (400 unit) Tab Take 1 tablet by mouth once daily.      carboxymethylcellulose (REFRESH PLUS) 0.5 % Dpet Apply 1 drop to eye daily as needed.      cetirizine (ZYRTEC) 10 MG tablet Take 10 mg by mouth once daily.      diclofenac sodium (VOLTAREN) 1 % Gel Apply topically daily as needed.      EScitalopram oxalate (LEXAPRO) 20 MG tablet Take 20 mg by mouth nightly.      famotidine (PEPCID) 40 MG tablet Take 40 mg by mouth nightly.      fluticasone propionate (FLONASE) 50 mcg/actuation nasal spray 1 spray by Each Nostril route 2 (two) times a day.      furosemide (LASIX) 20 MG tablet Take 20 mg by mouth once daily.      gabapentin (NEURONTIN) 600 MG tablet TAKE 1 TABLET BY MOUTH THREE TIMES A DAY 90 tablet 2    glucosamine/chondr rivera A sod (OSTEO BI-FLEX ORAL) Take 1 tablet by mouth Daily.      HYDROcodone-acetaminophen (NORCO) 5-325 mg per tablet Take 1 tablet by mouth every 8 (eight) hours as needed.      hyoscyamine (ANASPAZ,LEVSIN) 0.125 mg Tab Take 125 mcg by mouth 2 (two) times daily.      LIDOcaine (LIDODERM) 5 % Apply 1 patch topically per day as needed for pain. 30 patch 1    methocarbamoL (ROBAXIN)  500 MG Tab Take 500 mg by mouth.      multivitamin with folic acid 400 mcg Tab Take 1 tablet by mouth once daily.      MYRBETRIQ 25 mg Tb24 ER tablet Take 25 mg by mouth once daily.      omega 3-dha-epa-fish oil 1,000 mg (120 mg-180 mg) Cap Take 1 capsule by mouth once daily.      pantoprazole (PROTONIX) 40 MG tablet Take 40 mg by mouth 2 (two) times daily.      rosuvastatin (CRESTOR) 5 MG tablet Take 5 mg by mouth every evening.      traZODone (DESYREL) 50 MG tablet Take 50 mg by mouth nightly.      turmeric root extract 500 mg Cap Take 1 capsule by mouth once daily.      valACYclovir (VALTREX) 500 MG tablet Take 500 mg by mouth once daily.      VITAMIN B COMPLEX ORAL Take 1 tablet by mouth every morning.      vitamin E 400 UNIT capsule Take 400 Units by mouth once daily.      zonisamide (ZONEGRAN) 100 MG Cap Take 2 capsules (200 mg total) by mouth nightly. 180 capsule 1     No current facility-administered medications for this visit.       Review of patient's allergies indicates:   Allergen Reactions    Tizanidine Shortness Of Breath     Stops breathing    Pollen extracts Other (See Comments)

## 2024-04-05 ENCOUNTER — ANESTHESIA EVENT (OUTPATIENT)
Dept: SURGERY | Facility: HOSPITAL | Age: 79
End: 2024-04-05
Payer: MEDICARE

## 2024-04-17 ENCOUNTER — ANESTHESIA (OUTPATIENT)
Dept: SURGERY | Facility: HOSPITAL | Age: 79
End: 2024-04-17
Payer: MEDICARE

## 2024-04-17 ENCOUNTER — HOSPITAL ENCOUNTER (OUTPATIENT)
Facility: HOSPITAL | Age: 79
Discharge: HOME | End: 2024-04-17
Attending: ANESTHESIOLOGY | Admitting: ANESTHESIOLOGY
Payer: MEDICARE

## 2024-04-17 DIAGNOSIS — M53.3 COCCYDYNIA: ICD-10-CM

## 2024-04-17 PROCEDURE — 37000008 HC ANESTHESIA 1ST 15 MINUTES: Performed by: ANESTHESIOLOGY

## 2024-04-17 PROCEDURE — 63600175 PHARM REV CODE 636 W HCPCS: Performed by: ANESTHESIOLOGY

## 2024-04-17 PROCEDURE — D9220A PRA ANESTHESIA: Mod: ANES,,, | Performed by: ANESTHESIOLOGY

## 2024-04-17 PROCEDURE — D9220A PRA ANESTHESIA: Mod: CRNA,,,

## 2024-04-17 PROCEDURE — 25000003 PHARM REV CODE 250

## 2024-04-17 PROCEDURE — 64450 NJX AA&/STRD OTHER PN/BRANCH: CPT | Performed by: ANESTHESIOLOGY

## 2024-04-17 PROCEDURE — 20550 NJX 1 TENDON SHEATH/LIGAMENT: CPT | Mod: ,,, | Performed by: ANESTHESIOLOGY

## 2024-04-17 PROCEDURE — 20550 NJX 1 TENDON SHEATH/LIGAMENT: CPT | Performed by: ANESTHESIOLOGY

## 2024-04-17 PROCEDURE — 25000003 PHARM REV CODE 250: Performed by: ANESTHESIOLOGY

## 2024-04-17 RX ORDER — PROPOFOL 10 MG/ML
VIAL (ML) INTRAVENOUS
Status: DISCONTINUED | OUTPATIENT
Start: 2024-04-17 | End: 2024-04-17

## 2024-04-17 RX ORDER — LIDOCAINE HYDROCHLORIDE 20 MG/ML
INJECTION INTRAVENOUS
Status: DISCONTINUED | OUTPATIENT
Start: 2024-04-17 | End: 2024-04-17

## 2024-04-17 RX ORDER — TRIAMCINOLONE ACETONIDE 40 MG/ML
INJECTION, SUSPENSION INTRA-ARTICULAR; INTRAMUSCULAR
Status: DISCONTINUED
Start: 2024-04-17 | End: 2024-04-17 | Stop reason: HOSPADM

## 2024-04-17 RX ORDER — LIDOCAINE HYDROCHLORIDE 10 MG/ML
INJECTION INFILTRATION; PERINEURAL
Status: DISCONTINUED | OUTPATIENT
Start: 2024-04-17 | End: 2024-04-17 | Stop reason: HOSPADM

## 2024-04-17 RX ORDER — LIDOCAINE HYDROCHLORIDE 10 MG/ML
INJECTION INFILTRATION; PERINEURAL
Status: DISCONTINUED
Start: 2024-04-17 | End: 2024-04-17 | Stop reason: HOSPADM

## 2024-04-17 RX ORDER — TRIAMCINOLONE ACETONIDE 40 MG/ML
INJECTION, SUSPENSION INTRA-ARTICULAR; INTRAMUSCULAR
Status: DISCONTINUED | OUTPATIENT
Start: 2024-04-17 | End: 2024-04-17 | Stop reason: HOSPADM

## 2024-04-17 RX ADMIN — LIDOCAINE HYDROCHLORIDE 50 MG: 20 INJECTION INTRAVENOUS at 12:04

## 2024-04-17 RX ADMIN — Medication 20 MG: at 12:04

## 2024-04-17 RX ADMIN — SODIUM CHLORIDE: 9 INJECTION, SOLUTION INTRAVENOUS at 12:04

## 2024-04-17 RX ADMIN — Medication 60 MG: at 12:04

## 2024-04-17 NOTE — OP NOTE
Coccygeal Ligament Injection    Pre-Procedure Diagnoses:  1. Chronic back pain greater than 3 months  2. Coccydynia    Post-Procedure Diagnoses:  1. Chronic back pain greater than 3 months  2. Coccydynia    Anesthesia:  Local and MAC    Estimated Blood Loss:  None    Complications:  None    Informed Consent:  The procedure, risks, benefits, and alternatives were discussed with the patient. There were no contraindications to the procedure. The patient expressed understanding and agreed to proceed. Fully informed written consent was obtained.     Description of the Procedure:  The patient was taken to the operating room. IV access was obtained prior to the start of the procedure. The patient was positioned prone on the fluoroscopy table. Continuous hemodynamic monitoring was initiated and continued throughout the duration of the procedure. The skin overlying the lumbosacral spine was prepped with Chloraprep and draped into a sterile field. Fluoroscopy was used to identify the location of the coccygeal ligament. Skin anesthesia was achieved using 1 mL of 1% lidocaine over the injection site. A 22 gauge 3.5 inch Quinke spinal needle was slowly inserted and advanced under intermittent fluoroscopy into the coccygeal ligament. Proper needle position was confirmed under AP, oblique, and lateral fluoroscopic views. Negative aspiration was confirmed. 1 ml of Isovue was injected, which revealed spread in the ligament. Then 40 mg of Kenalog was easily injected. There was no pain on injection. The needle was removed intact and bleeding was nil. Sterile bandages were applied. The patient was taken to the recovery room for further observation in stable condition. The patient was then discharged home without any complications.

## 2024-04-17 NOTE — DISCHARGE SUMMARY
[facility] - Periop Services  Discharge Note  Short Stay    Procedure(s) (LRB):  Injection coccygeal ligament Injection (N/A)      OUTCOME: Patient tolerated treatment/procedure well without complication and is now ready for discharge.    DISPOSITION: Home or Self Care    FINAL DIAGNOSIS:  <principal problem not specified>    FOLLOWUP: In clinic    DISCHARGE INSTRUCTIONS:  No discharge procedures on file.     TIME SPENT ON DISCHARGE: 5 minutes

## 2024-04-17 NOTE — TRANSFER OF CARE
"Anesthesia Transfer of Care Note    Patient: Elizabeth Bruno    Procedure(s) Performed: Procedure(s) (LRB):  Injection coccygeal ligament Injection (N/A)    Patient location: OPS    Anesthesia Type: MAC    Transport from OR: Transported from OR on room air with adequate spontaneous ventilation    Post pain: adequate analgesia    Post assessment: no apparent anesthetic complications    Post vital signs: stable    Level of consciousness: awake, alert and oriented    Nausea/Vomiting: no nausea/vomiting    Complications: none    Transfer of care protocol was followed      Last vitals: Visit Vitals  BP (!) 145/79 (Patient Position: Lying)   Pulse 65   Temp 36.7 °C (98 °F) (Tympanic)   Resp 16   Ht 5' 2" (1.575 m)   Wt 62.6 kg (138 lb 0.1 oz)   SpO2 100%   Breastfeeding No   BMI 25.24 kg/m²     "

## 2024-04-17 NOTE — ANESTHESIA POSTPROCEDURE EVALUATION
Anesthesia Post Evaluation    Patient: Elizabeth Bruno    Procedure(s) Performed: Procedure(s) (LRB):  Injection coccygeal ligament Injection (N/A)    Final Anesthesia Type: general      Patient location during evaluation: PACU  Patient participation: Yes- Able to Participate  Level of consciousness: awake  Post-procedure vital signs: reviewed and stable  Pain management: adequate  Airway patency: patent    PONV status at discharge: vomiting (controlled) and nausea (controlled)  Anesthetic complications: no      Cardiovascular status: hemodynamically stable  Respiratory status: spontaneous ventilation and unassisted  Hydration status: euvolemic  Follow-up not needed.  Comments:                  Vitals Value Taken Time   /90 04/17/24 1332   Temp 36.5 °C (97.7 °F) 04/17/24 1330   Pulse 58 04/17/24 1346   Resp 19 04/17/24 1330   SpO2 96 % 04/17/24 1346   Vitals shown include unfiled device data.      No case tracking events are documented in the log.      Pain/Rene Score: No data recorded

## 2024-04-17 NOTE — ANESTHESIA PREPROCEDURE EVALUATION
"                                                                                                             04/17/2024  Elizabeth Bruno is a 79 y.o., female.  Pre-operative evaluation for Procedure(s) (LRB):  Injection coccygeal ligament Injection (N/A)    BP (!) 160/88   Pulse 67   Temp 36.7 °C (98 °F) (Tympanic)   Resp 20   Ht 5' 2" (1.575 m)   Wt 62.6 kg (138 lb 0.1 oz)   SpO2 98%   Breastfeeding No   BMI 25.24 kg/m²     Past Medical History:   Diagnosis Date    Arthritis     Chronic back pain     GERD (gastroesophageal reflux disease)     Heart murmur     Herpes     HTN (hypertension)     Insomnia     OAB (overactive bladder)     Seasonal allergies     Syringomyelia        Patient Active Problem List   Diagnosis    Chronic back pain greater than 3 months duration    Lumbar spondylosis    Lumbar degenerative disc disease    Coccydynia       Review of patient's allergies indicates:   Allergen Reactions    Tizanidine Shortness Of Breath     Stops breathing    Pollen extracts Other (See Comments)       Current Outpatient Medications   Medication Instructions    ascorbic acid (vitamin C) (VITAMIN C) 1,000 mg, Oral, Daily    aspirin (ECOTRIN) 81 mg, Oral, Daily    B-complex with vitamin C (Z-BEC OR EQUIV) tablet 1 tablet, Oral, Daily    calcium carbonate (OS-CARI) 500 mg calcium (1,250 mg) tablet 1 tablet, Oral, Daily    calcium-vitamin D 600 mg-10 mcg (400 unit) Tab 1 tablet, Oral, Daily    carboxymethylcellulose (REFRESH PLUS) 0.5 % Dpet 1 drop, Ophthalmic, Daily PRN    cetirizine (ZYRTEC) 10 mg, Oral, Daily    diclofenac sodium (VOLTAREN) 1 % Gel Topical, Daily PRN    EScitalopram oxalate (LEXAPRO) 20 mg, Oral, Nightly    famotidine (PEPCID) 40 mg, Oral, Nightly    fluticasone propionate (FLONASE) 50 mcg/actuation nasal spray 1 spray, Each Nostril, 2 times daily    furosemide (LASIX) 20 mg, Oral, Daily    gabapentin (NEURONTIN) 600 mg, Oral, 3 times daily    glucosamine/chondr rivera A sod (OSTEO BI-FLEX " ORAL) 1 tablet, Oral, As needed (PRN)    HYDROcodone-acetaminophen (NORCO) 5-325 mg per tablet 1 tablet, Oral, Every 8 hours PRN    hyoscyamine (ANASPAZ,LEVSIN) 125 mcg, Oral, 2 times daily    LIDOcaine (LIDODERM) 5 % Apply 1 patch topically per day as needed for pain.    methocarbamoL (ROBAXIN) 500 mg, Oral, As needed (PRN)    multivitamin with folic acid 400 mcg Tab 1 tablet, Oral, Daily    MYRBETRIQ 25 mg, Oral, Daily    omega 3-dha-epa-fish oil 1,000 mg (120 mg-180 mg) Cap 1 capsule, Oral, Daily    pantoprazole (PROTONIX) 40 mg, Oral, 2 times daily    rosuvastatin (CRESTOR) 5 mg, Oral, Nightly    traZODone (DESYREL) 50 mg, Oral, Nightly    turmeric root extract 500 mg Cap 1 capsule, Oral, Daily    valACYclovir (VALTREX) 500 mg, Oral, Daily    VITAMIN B COMPLEX ORAL 1 tablet, Oral, Every morning    vitamin E 400 Units, Oral, Daily    zonisamide (ZONEGRAN) 200 mg, Oral, Nightly       Past Surgical History:   Procedure Laterality Date    ANKLE SURGERY Right     bilatteral cataracts removed with lens implants Bilateral     CARPAL TUNNEL RELEASE       SECTION      CHOLECYSTECTOMY      EPIDURAL STEROID INJECTION INTO CERVICAL SPINE N/A 3/13/2024    Procedure: INJECTION, STEROID, SPINE, CERVICAL, EPIDURAL  C7-T1;  Surgeon: Vicky Perez MD;  Location: [facility] OR;  Service: Pain Management;  Laterality: N/A;  KAREN C7-T1    HERNIA REPAIR      HYSTERECTOMY      INJECTION N/A 2023    Procedure: Injection  coccygeal ligament injection;  Surgeon: Vciky Perez MD;  Location: [facility] OR;  Service: Pain Management;  Laterality: N/A;  coccygeal ligament injection    INJECTION OF ANESTHETIC AGENT AROUND MEDIAL BRANCH NERVES INNERVATING CERVICAL FACET JOINT Bilateral 2023    Procedure: Block-nerve-medial branch-cervical;  Surgeon: Vicky Perez MD;  Location: [facility] OR;  Service: Pain Management;  Laterality: Bilateral;  Medial Branch Block.. C4-C6    INJECTION OF ANESTHETIC AGENT AROUND MEDIAL BRANCH NERVES  "INNERVATING LUMBAR FACET JOINT Bilateral 03/09/2023    Procedure: BLOCK, NERVE, FACET JOINT, LUMBAR, MEDIAL BRANCH Bilateral L3-5;  Surgeon: Vicky Perez MD;  Location: [facility] OR;  Service: Pain Management;  Laterality: Bilateral;  L3-5    INJECTION OF ANESTHETIC AGENT AROUND MEDIAL BRANCH NERVES INNERVATING LUMBAR FACET JOINT Bilateral 7/5/2023    Procedure: Block-nerve-medial branch-lumbar;  Surgeon: Vicky Perez MD;  Location: [facility] OR;  Service: Pain Management;  Laterality: Bilateral;  Facet Joint Injection, Bilateral Lumbar L5-S1    INSERTION OF IMPLANTABLE LOOP RECORDER N/A     nonfunctioning    neck fusion      SPINAL CORD STIMULATOR IMPLANT      TONSILLECTOMY      TOTAL KNEE REPLACEMENT USING COMPUTER NAVIGATION Bilateral          Lab Results   Component Value Date    WBC 5.5 11/21/2023    HGB 11.3 11/21/2023    HCT 37.0 11/21/2023    MCV 68.6 03/25/2022     11/21/2023          BMP  Lab Results   Component Value Date     03/25/2022    K 4.1 03/25/2022    CHLORIDE 109 03/25/2022    CO2 29 03/25/2022    GLUCOSE 91 03/25/2022    BUN 18.0 03/25/2022    CREATININE 0.78 03/25/2022    CALCIUM 8.9 03/25/2022    ANIONGAP 10 12/02/2020    ESTGFRAFRICA 104 12/02/2020    EGFRNONAA >60 03/25/2022        INR  No results for input(s): "PT", "INR", "PROTIME", "APTT" in the last 72 hours.        Diagnostic Studies:      EKG:  No results found for this or any previous visit.    No results found for this or any previous visit.            Pre-op Assessment    I have reviewed the Patient Summary Reports.    I have reviewed the NPO Status.   I have reviewed the Medications.     Review of Systems  Anesthesia Hx:  No problems with previous Anesthesia             Denies Family Hx of Anesthesia complications.    Denies Personal Hx of Anesthesia complications.                    Cardiovascular:  Cardiovascular Normal                   No Cardiac Complaints                         Pulmonary:  Pulmonary Normal        " No Pulmonary Complaints               Hepatic/GI:        No Current GERD Sx              Physical Exam  General: Alert and Oriented    Airway:  Mallampati: II   Mouth Opening: Normal  TM Distance: Normal  Tongue: Normal  Neck ROM: Normal ROM    Dental:  Intact    Chest/Lungs:  Clear to auscultation, Normal Respiratory Rate    Heart:  Rate: Normal  Rhythm: Regular Rhythm        Anesthesia Plan  Type of Anesthesia, risks & benefits discussed:    Anesthesia Type: Gen Natural Airway  Intra-op Monitoring Plan: Standard ASA Monitors  Post Op Pain Control Plan: multimodal analgesia  Induction:  IV  Airway Plan: Direct  Informed Consent: Informed consent signed with the Patient and all parties understand the risks and agree with anesthesia plan.  All questions answered.   ASA Score: 2  Day of Surgery Review of History & Physical: H&P Update referred to the surgeon/provider.  Anesthesia Plan Notes: Nasal cannula vs facemask supplemental oxygenation   For patients with SHASHANK/obesity, may consider SuperNoval Nasal CPAP      Poss conversion to General LMA/JACOB discussed          Ready For Surgery From Anesthesia Perspective.     .

## 2024-04-17 NOTE — PLAN OF CARE
Problem: Adult Inpatient Plan of Care  Goal: Plan of Care Review  4/17/2024 1356 by Rafa Terrazas RN  Outcome: Ongoing, Progressing  4/17/2024 1315 by Rafa Terrazas RN  Outcome: Ongoing, Progressing  Goal: Patient-Specific Goal (Individualized)  4/17/2024 1356 by Rafa Terrazas RN  Outcome: Ongoing, Progressing  4/17/2024 1315 by Rafa Terrazas RN  Outcome: Ongoing, Progressing  Goal: Absence of Hospital-Acquired Illness or Injury  4/17/2024 1356 by Rafa Terrazas RN  Outcome: Ongoing, Progressing  4/17/2024 1315 by Rafa Terrazas RN  Outcome: Ongoing, Progressing  Goal: Optimal Comfort and Wellbeing  4/17/2024 1356 by Rafa Terrazas RN  Outcome: Ongoing, Progressing  4/17/2024 1315 by Rafa Terrazas RN  Outcome: Ongoing, Progressing  Goal: Readiness for Transition of Care  4/17/2024 1356 by Rafa Terrazas RN  Outcome: Ongoing, Progressing  4/17/2024 1315 by Rafa Terrazas RN  Outcome: Ongoing, Progressing

## 2024-04-18 VITALS
HEIGHT: 62 IN | TEMPERATURE: 98 F | SYSTOLIC BLOOD PRESSURE: 165 MMHG | HEART RATE: 59 BPM | BODY MASS INDEX: 25.4 KG/M2 | OXYGEN SATURATION: 97 % | DIASTOLIC BLOOD PRESSURE: 94 MMHG | RESPIRATION RATE: 19 BRPM | WEIGHT: 138 LBS

## 2024-05-02 ENCOUNTER — OFFICE VISIT (OUTPATIENT)
Dept: PAIN MEDICINE | Facility: CLINIC | Age: 79
End: 2024-05-02
Payer: MEDICARE

## 2024-05-02 VITALS
WEIGHT: 138 LBS | HEART RATE: 74 BPM | SYSTOLIC BLOOD PRESSURE: 162 MMHG | DIASTOLIC BLOOD PRESSURE: 87 MMHG | BODY MASS INDEX: 25.4 KG/M2 | HEIGHT: 62 IN

## 2024-05-02 DIAGNOSIS — M53.3 COCCYDYNIA: Primary | ICD-10-CM

## 2024-05-02 DIAGNOSIS — M54.81 BILATERAL OCCIPITAL NEURALGIA: ICD-10-CM

## 2024-05-02 PROCEDURE — 99213 OFFICE O/P EST LOW 20 MIN: CPT | Mod: 25,,, | Performed by: ANESTHESIOLOGY

## 2024-05-02 PROCEDURE — 1101F PT FALLS ASSESS-DOCD LE1/YR: CPT | Mod: CPTII,,, | Performed by: ANESTHESIOLOGY

## 2024-05-02 PROCEDURE — 3079F DIAST BP 80-89 MM HG: CPT | Mod: CPTII,,, | Performed by: ANESTHESIOLOGY

## 2024-05-02 PROCEDURE — 3288F FALL RISK ASSESSMENT DOCD: CPT | Mod: CPTII,,, | Performed by: ANESTHESIOLOGY

## 2024-05-02 PROCEDURE — 1159F MED LIST DOCD IN RCRD: CPT | Mod: CPTII,,, | Performed by: ANESTHESIOLOGY

## 2024-05-02 PROCEDURE — 1125F AMNT PAIN NOTED PAIN PRSNT: CPT | Mod: CPTII,,, | Performed by: ANESTHESIOLOGY

## 2024-05-02 PROCEDURE — 64405 NJX AA&/STRD GR OCPL NRV: CPT | Mod: 50,,, | Performed by: ANESTHESIOLOGY

## 2024-05-02 PROCEDURE — 1160F RVW MEDS BY RX/DR IN RCRD: CPT | Mod: CPTII,,, | Performed by: ANESTHESIOLOGY

## 2024-05-02 PROCEDURE — 3077F SYST BP >= 140 MM HG: CPT | Mod: CPTII,,, | Performed by: ANESTHESIOLOGY

## 2024-05-02 RX ORDER — BUPIVACAINE HYDROCHLORIDE 5 MG/ML
2 INJECTION, SOLUTION PERINEURAL
Status: COMPLETED | OUTPATIENT
Start: 2024-05-02 | End: 2024-05-02

## 2024-05-02 RX ORDER — NEOMYCIN SULFATE, POLYMYXIN B SULFATE, AND DEXAMETHASONE 3.5; 10000; 1 MG/G; [USP'U]/G; MG/G
OINTMENT OPHTHALMIC 4 TIMES DAILY
COMMUNITY
Start: 2024-04-10

## 2024-05-02 RX ADMIN — BUPIVACAINE HYDROCHLORIDE 10 MG: 5 INJECTION, SOLUTION PERINEURAL at 12:05

## 2024-05-02 NOTE — PROGRESS NOTES
Vicky Perez MD        PATIENT NAME: Elizabeth Bruno  : 1945  DATE: 24  MRN: [id number]      Billing Provider: Vicky Perez MD  Level of Service:   Patient PCP Information       Provider PCP Type    Nette Marroquin MD General            Reason for Visit / Chief Complaint: Post-op Evaluation (Post op coccgeal ligament injection, 24 pt would like Occipital nerve block in the office C/O pain level 1, states procedure helped. Not taking pain meds.)       Update PCP  Update Chief Complaint         History of Present Illness / Problem Focused Workflow     Elizabeth Bruno presents to the clinic with Post-op Evaluation (Post op coccgeal ligament injection, 24 pt would like Occipital nerve block in the office C/O pain level 1, states procedure helped. Not taking pain meds.)     This is a 79-year-old female who returns to clinic today for follow up of her chronic low back pain.  She underwent a coccygeal ligament injection a couple weeks ago.  She states that it took about 4 days to kick in, but she has been getting excellent relief since then.  She states that she barely feels the pain at all.  Her primary complaint today is occipital neuralgia.  She is requesting to have the occipital nerve blocks done again.  She has not had them done in about a year.      Pain  Associated symptoms include headaches, neck pain, numbness and weakness.   Low-back Pain  Associated symptoms include headaches, leg pain, numbness and weakness.   Back Pain  Associated symptoms include headaches, leg pain, numbness and weakness.   Neck Pain   Associated symptoms include headaches, leg pain, numbness and weakness.     Review of Systems     Review of Systems   Musculoskeletal:  Positive for back pain, gait problem, leg pain and neck pain.   Neurological:  Positive for weakness, numbness and headaches.   All other systems reviewed and are negative.       Medical / Social / Family History     Past Medical History:    Diagnosis Date    Arthritis     Chronic back pain     GERD (gastroesophageal reflux disease)     Heart murmur     Herpes     HTN (hypertension)     Insomnia     OAB (overactive bladder)     Seasonal allergies     Syringomyelia        Past Surgical History:   Procedure Laterality Date    ANKLE SURGERY Right     bilatteral cataracts removed with lens implants Bilateral     CARPAL TUNNEL RELEASE       SECTION      CHOLECYSTECTOMY      EPIDURAL STEROID INJECTION INTO CERVICAL SPINE N/A 2024    Procedure: INJECTION, STEROID, SPINE, CERVICAL, EPIDURAL  C7-T1;  Surgeon: Vicky Perez MD;  Location: [facility] OR;  Service: Pain Management;  Laterality: N/A;  KAREN C7-T1    eyelid surgery Bilateral     HERNIA REPAIR      HYSTERECTOMY      INJECTION N/A 2023    Procedure: Injection  coccygeal ligament injection;  Surgeon: Vicky Peerz MD;  Location: [facility] OR;  Service: Pain Management;  Laterality: N/A;  coccygeal ligament injection    INJECTION N/A 2024    Procedure: Injection coccygeal ligament Injection;  Surgeon: Vicky Perez MD;  Location: [facility] OR;  Service: Pain Management;  Laterality: N/A;  coccygeal ligament Injection    INJECTION OF ANESTHETIC AGENT AROUND MEDIAL BRANCH NERVES INNERVATING CERVICAL FACET JOINT Bilateral 2023    Procedure: Block-nerve-medial branch-cervical;  Surgeon: Vicky Perez MD;  Location: [facility] OR;  Service: Pain Management;  Laterality: Bilateral;  Medial Branch Block.. C4-C6    INJECTION OF ANESTHETIC AGENT AROUND MEDIAL BRANCH NERVES INNERVATING LUMBAR FACET JOINT Bilateral 2023    Procedure: BLOCK, NERVE, FACET JOINT, LUMBAR, MEDIAL BRANCH Bilateral L3-5;  Surgeon: Vicky Perez MD;  Location: [facility] OR;  Service: Pain Management;  Laterality: Bilateral;  L3-5    INJECTION OF ANESTHETIC AGENT AROUND MEDIAL BRANCH NERVES INNERVATING LUMBAR FACET JOINT Bilateral 2023    Procedure: Block-nerve-medial branch-lumbar;  Surgeon: Vicky URIARTE  MD Chris;  Location: [facility];  Service: Pain Management;  Laterality: Bilateral;  Facet Joint Injection, Bilateral Lumbar L5-S1    INSERTION OF IMPLANTABLE LOOP RECORDER N/A     nonfunctioning    neck fusion      SPINAL CORD STIMULATOR IMPLANT      TONSILLECTOMY      TOTAL KNEE REPLACEMENT USING COMPUTER NAVIGATION Bilateral        Social History  Ms. Bruno  reports that she has never smoked. She has never used smokeless tobacco. She reports that she does not currently use alcohol. She reports that she does not use drugs.    Family History  Ms.'chelsey Bruno family history includes Heart disease in her father and mother.    Medications and Allergies     Medications  Current Outpatient Medications   Medication Sig Dispense Refill    ascorbic acid, vitamin C, (VITAMIN C) 1000 MG tablet Take 1,000 mg by mouth once daily.      aspirin (ECOTRIN) 81 MG EC tablet Take 81 mg by mouth once daily.      B-complex with vitamin C (Z-BEC OR EQUIV) tablet Take 1 tablet by mouth once daily.      calcium carbonate (OS-CARI) 500 mg calcium (1,250 mg) tablet Take 1 tablet by mouth once daily.      calcium-vitamin D 600 mg-10 mcg (400 unit) Tab Take 1 tablet by mouth once daily.      carboxymethylcellulose (REFRESH PLUS) 0.5 % Dpet Apply 1 drop to eye daily as needed.      cetirizine (ZYRTEC) 10 MG tablet Take 10 mg by mouth once daily.      diclofenac sodium (VOLTAREN) 1 % Gel Apply topically daily as needed.      EScitalopram oxalate (LEXAPRO) 20 MG tablet Take 20 mg by mouth nightly.      famotidine (PEPCID) 40 MG tablet Take 40 mg by mouth nightly.      fluticasone propionate (FLONASE) 50 mcg/actuation nasal spray 1 spray by Each Nostril route 2 (two) times a day.      furosemide (LASIX) 20 MG tablet Take 20 mg by mouth once daily.      gabapentin (NEURONTIN) 600 MG tablet TAKE 1 TABLET BY MOUTH THREE TIMES A DAY 90 tablet 2    glucosamine/chondr rivera A sod (OSTEO BI-FLEX ORAL) Take 1 tablet by mouth as needed.       HYDROcodone-acetaminophen (NORCO) 5-325 mg per tablet Take 1 tablet by mouth every 8 (eight) hours as needed.      hyoscyamine (ANASPAZ,LEVSIN) 0.125 mg Tab Take 125 mcg by mouth 2 (two) times daily.      LIDOcaine (LIDODERM) 5 % Apply 1 patch topically per day as needed for pain. 30 patch 1    methocarbamoL (ROBAXIN) 500 MG Tab Take 500 mg by mouth as needed.      multivitamin with folic acid 400 mcg Tab Take 1 tablet by mouth once daily.      MYRBETRIQ 25 mg Tb24 ER tablet Take 25 mg by mouth once daily.      neomycin-polymyxin-dexamethasone (DEXACINE) 3.5 mg/g-10,000 unit/g-0.1 % Oint Place into both eyes 4 (four) times daily.      omega 3-dha-epa-fish oil 1,000 mg (120 mg-180 mg) Cap Take 1 capsule by mouth once daily.      pantoprazole (PROTONIX) 40 MG tablet Take 40 mg by mouth 2 (two) times daily.      rosuvastatin (CRESTOR) 5 MG tablet Take 5 mg by mouth every evening.      traZODone (DESYREL) 50 MG tablet Take 50 mg by mouth nightly.      turmeric root extract 500 mg Cap Take 1 capsule by mouth once daily.      valACYclovir (VALTREX) 500 MG tablet Take 500 mg by mouth once daily.      VITAMIN B COMPLEX ORAL Take 1 tablet by mouth every morning.      vitamin E 400 UNIT capsule Take 400 Units by mouth once daily.      zonisamide (ZONEGRAN) 100 MG Cap Take 2 capsules (200 mg total) by mouth nightly. 180 capsule 1     Current Facility-Administered Medications   Medication Dose Route Frequency Provider Last Rate Last Admin    BUPivacaine injection 10 mg  2 mL Subcutaneous 1 time in Clinic/HOD            Allergies  Review of patient's allergies indicates:   Allergen Reactions    Tizanidine Shortness Of Breath     Stops breathing    Pollen extracts Other (See Comments)       Physical Examination     Vitals:    05/02/24 1109   BP: (!) 162/87   Pulse: 74       Spine Musculoskeletal Exam    Gait    Gait is normal.    General      Constitutional: appears stated age, well-developed and well-nourished    Scleral  "icterus: no    Labored breathing: no    Psychiatric: normal mood and affect and no acute distress    Neurological: alert and oriented x3    Skin: intact    Lymphadenopathy: none     CV: extremities warm, well-perfused  Resp: nonlabored breathing    Assessment and Plan (including Health Maintenance)      Problem List  Smart Sets  Document Outside HM   :    Plan:   Coccydynia    Bilateral occipital neuralgia  -     BUPivacaine injection 10 mg      PROCEDURE:   BILATERAL OCCIPITAL NERVE BLOCKS  The patient was placed in a seated position. The site of pain and procedure were confirmed with the patient prior to starting the procedure. The patient's nuchal ridge of the occipital bone was identified and prepped with povidone-iodine. A 25 gauge  1" needle was advanced through the skin and subcutaneous tissues lateral to the occipital protuberance in the area of the left Greater Occipital Nerve.  Negative aspiration was confrimed.  A total of 1 ml of 0.5% bupivacaine was injected.  There were no signs or symptoms of intravascular injection. The same procedure was repeated in identical fashion on the right side.  No complications were evident. No specimens collected.    She is about to leave for 4 months and will call to return to clinic when she is back in town.  Problem List Items Addressed This Visit          Orthopedic    Coccydynia - Primary    Bilateral occipital neuralgia    Relevant Medications    BUPivacaine injection 10 mg (Start on 5/2/2024 12:00 PM)           Future Appointments   Date Time Provider Department Center   11/19/2024 10:30 AM Vicky Perez MD [facility]              There are no Patient Instructions on file for this visit.  No follow-ups on file.     Signature:  Vicky Perez MD      Date of encounter: 5/2/24              "

## 2024-12-11 ENCOUNTER — OFFICE VISIT (OUTPATIENT)
Facility: CLINIC | Age: 79
End: 2024-12-11
Payer: MEDICARE

## 2024-12-11 VITALS
TEMPERATURE: 98 F | SYSTOLIC BLOOD PRESSURE: 150 MMHG | BODY MASS INDEX: 25.4 KG/M2 | WEIGHT: 138 LBS | HEIGHT: 62 IN | DIASTOLIC BLOOD PRESSURE: 81 MMHG | HEART RATE: 83 BPM

## 2024-12-11 DIAGNOSIS — M54.81 BILATERAL OCCIPITAL NEURALGIA: ICD-10-CM

## 2024-12-11 DIAGNOSIS — M47.816 LUMBAR SPONDYLOSIS: ICD-10-CM

## 2024-12-11 DIAGNOSIS — M53.3 COCCYDYNIA: ICD-10-CM

## 2024-12-11 DIAGNOSIS — G89.29 CHRONIC BACK PAIN GREATER THAN 3 MONTHS DURATION: ICD-10-CM

## 2024-12-11 DIAGNOSIS — M51.360 DEGENERATION OF INTERVERTEBRAL DISC OF LUMBAR REGION WITH DISCOGENIC BACK PAIN: Primary | ICD-10-CM

## 2024-12-11 DIAGNOSIS — M54.9 CHRONIC BACK PAIN GREATER THAN 3 MONTHS DURATION: ICD-10-CM

## 2024-12-11 PROCEDURE — 3077F SYST BP >= 140 MM HG: CPT | Mod: CPTII,,, | Performed by: ANESTHESIOLOGY

## 2024-12-11 PROCEDURE — 1125F AMNT PAIN NOTED PAIN PRSNT: CPT | Mod: CPTII,,, | Performed by: ANESTHESIOLOGY

## 2024-12-11 PROCEDURE — 3079F DIAST BP 80-89 MM HG: CPT | Mod: CPTII,,, | Performed by: ANESTHESIOLOGY

## 2024-12-11 PROCEDURE — 1160F RVW MEDS BY RX/DR IN RCRD: CPT | Mod: CPTII,,, | Performed by: ANESTHESIOLOGY

## 2024-12-11 PROCEDURE — 1101F PT FALLS ASSESS-DOCD LE1/YR: CPT | Mod: CPTII,,, | Performed by: ANESTHESIOLOGY

## 2024-12-11 PROCEDURE — 3288F FALL RISK ASSESSMENT DOCD: CPT | Mod: CPTII,,, | Performed by: ANESTHESIOLOGY

## 2024-12-11 PROCEDURE — 99214 OFFICE O/P EST MOD 30 MIN: CPT | Mod: ,,, | Performed by: ANESTHESIOLOGY

## 2024-12-11 PROCEDURE — 1159F MED LIST DOCD IN RCRD: CPT | Mod: CPTII,,, | Performed by: ANESTHESIOLOGY

## 2024-12-11 RX ORDER — AMLODIPINE BESYLATE 5 MG/1
1 TABLET ORAL DAILY
COMMUNITY
Start: 2024-10-23

## 2024-12-11 NOTE — PROGRESS NOTES
Vicky Perez MD        PATIENT NAME: Elizabeth Bruno  : 1945  DATE: 24  MRN: [id number]      Billing Provider: Vicky Perez MD  Level of Service:   Patient PCP Information       Provider PCP Type    Nette Marroquin MD General            Reason for Visit / Chief Complaint: Low-back Pain (6 month f/u, OTC medication for relief, pain level 4/10)       Update PCP  Update Chief Complaint         History of Present Illness / Problem Focused Workflow     Elizabeth Bruno presents to the clinic with Low-back Pain (6 month f/u, OTC medication for relief, pain level 4/10)     This is a 79-year-old female who returns to clinic today for follow up of her chronic low back pain.  She underwent a coccygeal ligament injection earlier this year.  She states that it took about 4 days to kick in, but then she got about 4 months of relief.  She was living in [place] from  until just 2 days ago.  She had a flare up of coccydynia while there and underwent another coccygeal ligament injection while there in September.  The same pain has now come back.        Pain  Associated symptoms include headaches, neck pain, numbness and weakness.   Low-back Pain  Associated symptoms include headaches, leg pain, numbness and weakness.   Back Pain  Associated symptoms include headaches, leg pain, numbness and weakness.   Neck Pain   Associated symptoms include headaches, leg pain, numbness and weakness.     Review of Systems     Review of Systems   Musculoskeletal:  Positive for back pain, gait problem, leg pain and neck pain.   Neurological:  Positive for weakness, numbness and headaches.   All other systems reviewed and are negative.       Medical / Social / Family History     Past Medical History:   Diagnosis Date    Arthritis     Chronic back pain     GERD (gastroesophageal reflux disease)     Heart murmur     Herpes     HTN (hypertension)     Insomnia     OAB (overactive bladder)     Seasonal allergies      Syringomyelia        Past Surgical History:   Procedure Laterality Date    ANKLE SURGERY Right     bilatteral cataracts removed with lens implants Bilateral     CARPAL TUNNEL RELEASE       SECTION      CHOLECYSTECTOMY      EPIDURAL STEROID INJECTION INTO CERVICAL SPINE N/A 2024    Procedure: INJECTION, STEROID, SPINE, CERVICAL, EPIDURAL  C7-T1;  Surgeon: Vicky Perez MD;  Location: [facility] OR;  Service: Pain Management;  Laterality: N/A;  KAREN C7-T1    eyelid surgery Bilateral     HERNIA REPAIR      HYSTERECTOMY      INJECTION N/A 2023    Procedure: Injection  coccygeal ligament injection;  Surgeon: Vicky Perez MD;  Location: [facility] OR;  Service: Pain Management;  Laterality: N/A;  coccygeal ligament injection    INJECTION N/A 2024    Procedure: Injection coccygeal ligament Injection;  Surgeon: Vicky Perez MD;  Location: [facility] OR;  Service: Pain Management;  Laterality: N/A;  coccygeal ligament Injection    INJECTION OF ANESTHETIC AGENT AROUND MEDIAL BRANCH NERVES INNERVATING CERVICAL FACET JOINT Bilateral 2023    Procedure: Block-nerve-medial branch-cervical;  Surgeon: Vicky Perez MD;  Location: [facility] OR;  Service: Pain Management;  Laterality: Bilateral;  Medial Branch Block.. C4-C6    INJECTION OF ANESTHETIC AGENT AROUND MEDIAL BRANCH NERVES INNERVATING LUMBAR FACET JOINT Bilateral 2023    Procedure: BLOCK, NERVE, FACET JOINT, LUMBAR, MEDIAL BRANCH Bilateral L3-5;  Surgeon: Vicky Perez MD;  Location: [facility] OR;  Service: Pain Management;  Laterality: Bilateral;  L3-5    INJECTION OF ANESTHETIC AGENT AROUND MEDIAL BRANCH NERVES INNERVATING LUMBAR FACET JOINT Bilateral 2023    Procedure: Block-nerve-medial branch-lumbar;  Surgeon: Vicky Perez MD;  Location: [facility] OR;  Service: Pain Management;  Laterality: Bilateral;  Facet Joint Injection, Bilateral Lumbar L5-S1    INSERTION OF IMPLANTABLE LOOP RECORDER N/A     nonfunctioning    neck fusion       SPINAL CORD STIMULATOR IMPLANT      TONSILLECTOMY      TOTAL KNEE REPLACEMENT USING COMPUTER NAVIGATION Bilateral        Social History  Ms. Bruno  reports that she has never smoked. She has never used smokeless tobacco. She reports that she does not currently use alcohol. She reports that she does not use drugs.    Family History  Ms.'chelsey Bruno family history includes Heart disease in her father and mother.    Medications and Allergies     Medications  Outpatient Medications Marked as Taking for the 12/11/24 encounter (Office Visit) with Vicky Perez MD   Medication Sig Dispense Refill    amLODIPine (NORVASC) 5 MG tablet Take 1 tablet by mouth once daily.      ascorbic acid, vitamin C, (VITAMIN C) 1000 MG tablet Take 1,000 mg by mouth once daily.      aspirin (ECOTRIN) 81 MG EC tablet Take 81 mg by mouth once daily.      B-complex with vitamin C (Z-BEC OR EQUIV) tablet Take 1 tablet by mouth once daily.      calcium carbonate (OS-CARI) 500 mg calcium (1,250 mg) tablet Take 1 tablet by mouth once daily.      calcium-vitamin D 600 mg-10 mcg (400 unit) Tab Take 1 tablet by mouth once daily.      carboxymethylcellulose (REFRESH PLUS) 0.5 % Dpet Apply 1 drop to eye daily as needed.      cetirizine (ZYRTEC) 10 MG tablet Take 10 mg by mouth once daily.      diclofenac sodium (VOLTAREN) 1 % Gel Apply topically daily as needed.      EScitalopram oxalate (LEXAPRO) 20 MG tablet Take 20 mg by mouth nightly.      famotidine (PEPCID) 40 MG tablet Take 40 mg by mouth nightly.      fluticasone propionate (FLONASE) 50 mcg/actuation nasal spray 1 spray by Each Nostril route 2 (two) times a day.      furosemide (LASIX) 20 MG tablet Take 20 mg by mouth once daily.      gabapentin (NEURONTIN) 600 MG tablet TAKE 1 TABLET BY MOUTH THREE TIMES A DAY 90 tablet 2    glucosamine/chondr rivera A sod (OSTEO BI-FLEX ORAL) Take 1 tablet by mouth as needed.      HYDROcodone-acetaminophen (NORCO) 5-325 mg per tablet Take 1 tablet by mouth  every 8 (eight) hours as needed.      hyoscyamine (ANASPAZ,LEVSIN) 0.125 mg Tab Take 125 mcg by mouth 2 (two) times daily.      LIDOcaine (LIDODERM) 5 % Apply 1 patch topically per day as needed for pain. 30 patch 1    methocarbamoL (ROBAXIN) 500 MG Tab Take 500 mg by mouth as needed.      multivitamin with folic acid 400 mcg Tab Take 1 tablet by mouth once daily.      MYRBETRIQ 25 mg Tb24 ER tablet Take 25 mg by mouth once daily.      omega 3-dha-epa-fish oil 1,000 mg (120 mg-180 mg) Cap Take 1 capsule by mouth once daily.      pantoprazole (PROTONIX) 40 MG tablet Take 40 mg by mouth 2 (two) times daily.      traZODone (DESYREL) 50 MG tablet Take 50 mg by mouth nightly.      turmeric root extract 500 mg Cap Take 1 capsule by mouth once daily.      valACYclovir (VALTREX) 500 MG tablet Take 500 mg by mouth once daily.      VITAMIN B COMPLEX ORAL Take 1 tablet by mouth every morning.      vitamin E 400 UNIT capsule Take 400 Units by mouth once daily.         Allergies  Review of patient's allergies indicates:   Allergen Reactions    Tizanidine Shortness Of Breath     Stops breathing    Pollen extracts Other (See Comments)       Physical Examination     Vitals:    12/11/24 1149   BP: (!) 150/81   Pulse: 83   Temp: 98.3 °F (36.8 °C)       Spine Musculoskeletal Exam    Gait    Gait is normal.    General      Constitutional: appears stated age, well-developed and well-nourished    Scleral icterus: no    Labored breathing: no    Psychiatric: normal mood and affect and no acute distress    Neurological: alert and oriented x3    Skin: intact    Lymphadenopathy: none     CV: extremities warm, well-perfused  Resp: nonlabored breathing    Assessment and Plan (including Health Maintenance)      Problem List  Smart Sets  Document Outside HM   :    Plan:   Degeneration of intervertebral disc of lumbar region with discogenic back pain    Lumbar spondylosis    Chronic back pain greater than 3 months duration    Bilateral occipital  neuralgia    Coccydynia        Problem List Items Addressed This Visit          Neuro    Lumbar spondylosis    Lumbar degenerative disc disease - Primary       Orthopedic    Chronic back pain greater than 3 months duration    Coccydynia    Bilateral occipital neuralgia           Future Appointments   Date Time Provider Department Center   1/28/2025 11:30 AM Vicky Perez MD [facility]                There are no Patient Instructions on file for this visit.  No follow-ups on file.     Signature:  Vicky Perez MD      Date of encounter: 12/11/24

## 2025-01-13 ENCOUNTER — ANESTHESIA (OUTPATIENT)
Facility: HOSPITAL | Age: 80
End: 2025-01-13
Payer: MEDICARE

## 2025-01-13 ENCOUNTER — ANESTHESIA EVENT (OUTPATIENT)
Facility: HOSPITAL | Age: 80
End: 2025-01-13
Payer: MEDICARE

## 2025-01-13 ENCOUNTER — HOSPITAL ENCOUNTER (OUTPATIENT)
Facility: HOSPITAL | Age: 80
Discharge: HOME | End: 2025-01-13
Attending: ANESTHESIOLOGY | Admitting: ANESTHESIOLOGY
Payer: MEDICARE

## 2025-01-13 DIAGNOSIS — M54.9 CHRONIC BACK PAIN GREATER THAN 3 MONTHS DURATION: Primary | ICD-10-CM

## 2025-01-13 DIAGNOSIS — G89.29 CHRONIC BACK PAIN GREATER THAN 3 MONTHS DURATION: Primary | ICD-10-CM

## 2025-01-13 DIAGNOSIS — M53.3 COCCYDYNIA: ICD-10-CM

## 2025-01-13 PROCEDURE — 77002 NEEDLE LOCALIZATION BY XRAY: CPT | Mod: 26,,, | Performed by: ANESTHESIOLOGY

## 2025-01-13 PROCEDURE — 63600175 PHARM REV CODE 636 W HCPCS: Performed by: NURSE ANESTHETIST, CERTIFIED REGISTERED

## 2025-01-13 PROCEDURE — 37000008 HC ANESTHESIA 1ST 15 MINUTES: Performed by: ANESTHESIOLOGY

## 2025-01-13 PROCEDURE — 20605 DRAIN/INJ JOINT/BURSA W/O US: CPT | Performed by: ANESTHESIOLOGY

## 2025-01-13 PROCEDURE — 20605 DRAIN/INJ JOINT/BURSA W/O US: CPT | Mod: ,,, | Performed by: ANESTHESIOLOGY

## 2025-01-13 PROCEDURE — 63600175 PHARM REV CODE 636 W HCPCS: Performed by: ANESTHESIOLOGY

## 2025-01-13 PROCEDURE — 25000003 PHARM REV CODE 250

## 2025-01-13 PROCEDURE — D9220A PRA ANESTHESIA: Mod: ,,, | Performed by: NURSE ANESTHETIST, CERTIFIED REGISTERED

## 2025-01-13 PROCEDURE — 25500020 PHARM REV CODE 255: Performed by: ANESTHESIOLOGY

## 2025-01-13 RX ORDER — BUPIVACAINE HYDROCHLORIDE 2.5 MG/ML
INJECTION, SOLUTION EPIDURAL; INFILTRATION; INTRACAUDAL
Status: DISCONTINUED | OUTPATIENT
Start: 2025-01-13 | End: 2025-01-13 | Stop reason: HOSPADM

## 2025-01-13 RX ORDER — PROPOFOL 10 MG/ML
VIAL (ML) INTRAVENOUS
Status: DISCONTINUED | OUTPATIENT
Start: 2025-01-13 | End: 2025-01-13

## 2025-01-13 RX ORDER — IOPAMIDOL 408 MG/ML
INJECTION, SOLUTION INTRATHECAL
Status: DISCONTINUED | OUTPATIENT
Start: 2025-01-13 | End: 2025-01-13 | Stop reason: HOSPADM

## 2025-01-13 RX ORDER — SODIUM CHLORIDE, SODIUM GLUCONATE, SODIUM ACETATE, POTASSIUM CHLORIDE AND MAGNESIUM CHLORIDE 30; 37; 368; 526; 502 MG/100ML; MG/100ML; MG/100ML; MG/100ML; MG/100ML
INJECTION, SOLUTION INTRAVENOUS CONTINUOUS
Status: DISCONTINUED | OUTPATIENT
Start: 2025-01-13 | End: 2025-01-13 | Stop reason: HOSPADM

## 2025-01-13 RX ORDER — SODIUM CHLORIDE, SODIUM LACTATE, POTASSIUM CHLORIDE, CALCIUM CHLORIDE 600; 310; 30; 20 MG/100ML; MG/100ML; MG/100ML; MG/100ML
INJECTION, SOLUTION INTRAVENOUS CONTINUOUS
Status: DISCONTINUED | OUTPATIENT
Start: 2025-01-13 | End: 2025-01-13 | Stop reason: HOSPADM

## 2025-01-13 RX ORDER — ONDANSETRON HYDROCHLORIDE 2 MG/ML
4 INJECTION, SOLUTION INTRAVENOUS DAILY PRN
Status: DISCONTINUED | OUTPATIENT
Start: 2025-01-13 | End: 2025-01-13 | Stop reason: HOSPADM

## 2025-01-13 RX ORDER — LIDOCAINE HYDROCHLORIDE 10 MG/ML
INJECTION, SOLUTION EPIDURAL; INFILTRATION; INTRACAUDAL; PERINEURAL
Status: DISCONTINUED | OUTPATIENT
Start: 2025-01-13 | End: 2025-01-13 | Stop reason: HOSPADM

## 2025-01-13 RX ORDER — HYDROCODONE BITARTRATE AND ACETAMINOPHEN 5; 325 MG/1; MG/1
TABLET ORAL
Status: COMPLETED
Start: 2025-01-13 | End: 2025-01-13

## 2025-01-13 RX ORDER — TRIAMCINOLONE ACETONIDE 40 MG/ML
INJECTION, SUSPENSION INTRA-ARTICULAR; INTRAMUSCULAR
Status: DISCONTINUED | OUTPATIENT
Start: 2025-01-13 | End: 2025-01-13 | Stop reason: HOSPADM

## 2025-01-13 RX ORDER — LIDOCAINE HYDROCHLORIDE 10 MG/ML
INJECTION, SOLUTION EPIDURAL; INFILTRATION; INTRACAUDAL; PERINEURAL
Status: DISCONTINUED | OUTPATIENT
Start: 2025-01-13 | End: 2025-01-13

## 2025-01-13 RX ADMIN — PROPOFOL 70 MG: 10 INJECTION, EMULSION INTRAVENOUS at 10:01

## 2025-01-13 RX ADMIN — HYDROCODONE BITARTRATE AND ACETAMINOPHEN 1 TABLET: 5; 325 TABLET ORAL at 11:01

## 2025-01-13 RX ADMIN — LIDOCAINE HYDROCHLORIDE 20 MG: 10 INJECTION, SOLUTION EPIDURAL; INFILTRATION; INTRACAUDAL; PERINEURAL at 10:01

## 2025-01-13 NOTE — ANESTHESIA PREPROCEDURE EVALUATION
2025  Elizabeth Bruno is a 79 y.o., female with   -------------------------------------    Arthritis    Chronic back pain    GERD (gastroesophageal reflux disease)    Heart murmur    Herpes    HTN (hypertension)    Insomnia    OAB (overactive bladder)    Seasonal allergies    Syringomyelia     And   ----------------------------    Ankle surgery    Bilatteral cataracts removed with lens implants    Carpal tunnel release     section    Cholecystectomy    Epidural steroid injection into cervical spine    Procedure: INJECTION, STEROID, SPINE, CERVICAL, EPIDURAL  C7-T1;  Surgeon: Vicky Perez MD;  Location: [facility] OR;  Service: Pain Management;  Laterality: N/A;  KAREN C7-T1    Eyelid surgery    Hernia repair    Hysterectomy    Injection    Procedure: Injection  coccygeal ligament injection;  Surgeon: Vicky Perez MD;  Location: [facility] OR;  Service: Pain Management;  Laterality: N/A;  coccygeal ligament injection    Injection    Procedure: Injection coccygeal ligament Injection;  Surgeon: Vicky Perez MD;  Location: [facility] OR;  Service: Pain Management;  Laterality: N/A;  coccygeal ligament Injection    Injection of anesthetic agent around medial branch nerves innervating cervical facet joint    Procedure: Block-nerve-medial branch-cervical;  Surgeon: Vicky Perez MD;  Location: [facility] OR;  Service: Pain Management;  Laterality: Bilateral;  Medial Branch Block.. C4-C6    Injection of anesthetic agent around medial branch nerves innervating lumbar facet joint    Procedure: BLOCK, NERVE, FACET JOINT, LUMBAR, MEDIAL BRANCH Bilateral L3-5;  Surgeon: Vicky Perez MD;  Location: [facility] OR;  Service: Pain Management;  Laterality: Bilateral;  L3-5    Injection of anesthetic agent around medial branch nerves innervating lumbar facet joint    Procedure: Block-nerve-medial branch-lumbar;   Surgeon: Vicky Perez MD;  Location: [facility];  Service: Pain Management;  Laterality: Bilateral;  Facet Joint Injection, Bilateral Lumbar L5-S1    Insertion of implantable loop recorder    nonfunctioning    Neck fusion    Spinal cord stimulator implant    Tonsillectomy    Total knee replacement using computer navigation       Presents for piriformis or coccygeal ligament injection       Pre-op Assessment    I have reviewed the NPO Status.      Review of Systems  Cardiovascular:     Hypertension                                    Hypertension         Hepatic/GI:     GERD         Gerd          Musculoskeletal:  Arthritis        Arthritis          Neurological:    Neuromuscular Disease,  Headaches      Dx of Headaches   Arthritis                         Neuromuscular Disease       Physical Exam  General: Well nourished, Cooperative, Alert and Oriented  Elderly   Airway:  Mallampati: II   Mouth Opening: Normal  TM Distance: Normal  Tongue: Normal  Neck ROM: Normal ROM    Dental:  Intact    Chest/Lungs:  Clear to auscultation, Normal Respiratory Rate    Heart:  Rate: Normal  Rhythm: Regular Rhythm        Anesthesia Plan  Type of Anesthesia, risks & benefits discussed:    Anesthesia Type: Gen Natural Airway  Intra-op Monitoring Plan: Standard ASA Monitors  Post Op Pain Control Plan: IV/PO Opioids PRN  Induction:  IV  Airway Plan: Direct  Informed Consent: Informed consent signed with the Patient and all parties understand the risks and agree with anesthesia plan.  All questions answered. Patient consented to blood products? No  ASA Score: 3  Day of Surgery Review of History & Physical: H&P Update referred to the surgeon/provider.  Anesthesia Plan Notes: Nasal cannula vs facemask supplemental oxygenation   For patients with SHASHANK/obesity, may consider SuperNoval Nasal CPAP      Ready For Surgery From Anesthesia Perspective.     .

## 2025-01-13 NOTE — H&P
Vikcy Perez MD          PATIENT NAME: Elizabeth Bruno  : 1945    MRN: [id number]       Billing Provider: Vicky Perez MD  Level of Service:   Patient PCP Information         Provider PCP Type     Nette Marroquin MD General                Reason for Visit / Chief Complaint: Low-back Pain (6 month f/u, OTC medication for relief, pain level 4/10)         Update PCP   Update Chief Complaint             History of Present Illness / Problem Focused Workflow      Elizabeth Bruno presents to the clinic with Low-back Pain (6 month f/u, OTC medication for relief, pain level 4/10)     This is a 79-year-old female who returns to clinic today for follow up of her chronic low back pain.  She underwent a coccygeal ligament injection earlier this year.  She states that it took about 4 days to kick in, but then she got about 4 months of relief.  She was living in [place] from  until just 2 days ago.  She had a flare up of coccydynia while there and underwent another coccygeal ligament injection while there in September.  The same pain has now come back.           Pain  Associated symptoms include headaches, neck pain, numbness and weakness.   Low-back Pain  Associated symptoms include headaches, leg pain, numbness and weakness.   Back Pain  Associated symptoms include headaches, leg pain, numbness and weakness.   Neck Pain   Associated symptoms include headaches, leg pain, numbness and weakness.      Review of Systems      Review of Systems   Musculoskeletal:  Positive for back pain, gait problem, leg pain and neck pain.   Neurological:  Positive for weakness, numbness and headaches.   All other systems reviewed and are negative.        Medical / Social / Family History           Past Medical History:   Diagnosis Date    Arthritis      Chronic back pain      GERD (gastroesophageal reflux disease)      Heart murmur      Herpes      HTN (hypertension)      Insomnia      OAB (overactive bladder)      Seasonal  allergies      Syringomyelia                 Past Surgical History:   Procedure Laterality Date    ANKLE SURGERY Right      bilatteral cataracts removed with lens implants Bilateral      CARPAL TUNNEL RELEASE         SECTION        CHOLECYSTECTOMY        EPIDURAL STEROID INJECTION INTO CERVICAL SPINE N/A 2024     Procedure: INJECTION, STEROID, SPINE, CERVICAL, EPIDURAL  C7-T1;  Surgeon: Vicky Perez MD;  Location: [facility] OR;  Service: Pain Management;  Laterality: N/A;  KAREN C7-T1    eyelid surgery Bilateral      HERNIA REPAIR        HYSTERECTOMY        INJECTION N/A 2023     Procedure: Injection  coccygeal ligament injection;  Surgeon: Vicky Perez MD;  Location: [facility] OR;  Service: Pain Management;  Laterality: N/A;  coccygeal ligament injection    INJECTION N/A 2024     Procedure: Injection coccygeal ligament Injection;  Surgeon: Vicky Perez MD;  Location: [facility] OR;  Service: Pain Management;  Laterality: N/A;  coccygeal ligament Injection    INJECTION OF ANESTHETIC AGENT AROUND MEDIAL BRANCH NERVES INNERVATING CERVICAL FACET JOINT Bilateral 2023     Procedure: Block-nerve-medial branch-cervical;  Surgeon: Vicky Perez MD;  Location: [facility] OR;  Service: Pain Management;  Laterality: Bilateral;  Medial Branch Block.. C4-C6    INJECTION OF ANESTHETIC AGENT AROUND MEDIAL BRANCH NERVES INNERVATING LUMBAR FACET JOINT Bilateral 2023     Procedure: BLOCK, NERVE, FACET JOINT, LUMBAR, MEDIAL BRANCH Bilateral L3-5;  Surgeon: Vicky Perez MD;  Location: [facility] OR;  Service: Pain Management;  Laterality: Bilateral;  L3-5    INJECTION OF ANESTHETIC AGENT AROUND MEDIAL BRANCH NERVES INNERVATING LUMBAR FACET JOINT Bilateral 2023     Procedure: Block-nerve-medial branch-lumbar;  Surgeon: Vicky Perez MD;  Location: [facility] OR;  Service: Pain Management;  Laterality: Bilateral;  Facet Joint Injection, Bilateral Lumbar L5-S1    INSERTION OF IMPLANTABLE LOOP RECORDER N/A        nonfunctioning    neck fusion        SPINAL CORD STIMULATOR IMPLANT        TONSILLECTOMY        TOTAL KNEE REPLACEMENT USING COMPUTER NAVIGATION Bilateral           Social History  Ms. Bruno  reports that she has never smoked. She has never used smokeless tobacco. She reports that she does not currently use alcohol. She reports that she does not use drugs.     Family History  Ms.'s Bruno family history includes Heart disease in her father and mother.     Medications and Allergies      Medications         Outpatient Medications Marked as Taking for the 12/11/24 encounter (Office Visit) with Vicky Perez MD   Medication Sig Dispense Refill    amLODIPine (NORVASC) 5 MG tablet Take 1 tablet by mouth once daily.        ascorbic acid, vitamin C, (VITAMIN C) 1000 MG tablet Take 1,000 mg by mouth once daily.        aspirin (ECOTRIN) 81 MG EC tablet Take 81 mg by mouth once daily.        B-complex with vitamin C (Z-BEC OR EQUIV) tablet Take 1 tablet by mouth once daily.        calcium carbonate (OS-CARI) 500 mg calcium (1,250 mg) tablet Take 1 tablet by mouth once daily.        calcium-vitamin D 600 mg-10 mcg (400 unit) Tab Take 1 tablet by mouth once daily.        carboxymethylcellulose (REFRESH PLUS) 0.5 % Dpet Apply 1 drop to eye daily as needed.        cetirizine (ZYRTEC) 10 MG tablet Take 10 mg by mouth once daily.        diclofenac sodium (VOLTAREN) 1 % Gel Apply topically daily as needed.        EScitalopram oxalate (LEXAPRO) 20 MG tablet Take 20 mg by mouth nightly.        famotidine (PEPCID) 40 MG tablet Take 40 mg by mouth nightly.        fluticasone propionate (FLONASE) 50 mcg/actuation nasal spray 1 spray by Each Nostril route 2 (two) times a day.        furosemide (LASIX) 20 MG tablet Take 20 mg by mouth once daily.        gabapentin (NEURONTIN) 600 MG tablet TAKE 1 TABLET BY MOUTH THREE TIMES A DAY 90 tablet 2    glucosamine/chondr rivera A sod (OSTEO BI-FLEX ORAL) Take 1 tablet by mouth as  needed.        HYDROcodone-acetaminophen (NORCO) 5-325 mg per tablet Take 1 tablet by mouth every 8 (eight) hours as needed.        hyoscyamine (ANASPAZ,LEVSIN) 0.125 mg Tab Take 125 mcg by mouth 2 (two) times daily.        LIDOcaine (LIDODERM) 5 % Apply 1 patch topically per day as needed for pain. 30 patch 1    methocarbamoL (ROBAXIN) 500 MG Tab Take 500 mg by mouth as needed.        multivitamin with folic acid 400 mcg Tab Take 1 tablet by mouth once daily.        MYRBETRIQ 25 mg Tb24 ER tablet Take 25 mg by mouth once daily.        omega 3-dha-epa-fish oil 1,000 mg (120 mg-180 mg) Cap Take 1 capsule by mouth once daily.        pantoprazole (PROTONIX) 40 MG tablet Take 40 mg by mouth 2 (two) times daily.        traZODone (DESYREL) 50 MG tablet Take 50 mg by mouth nightly.        turmeric root extract 500 mg Cap Take 1 capsule by mouth once daily.        valACYclovir (VALTREX) 500 MG tablet Take 500 mg by mouth once daily.        VITAMIN B COMPLEX ORAL Take 1 tablet by mouth every morning.        vitamin E 400 UNIT capsule Take 400 Units by mouth once daily.             Allergies        Review of patient's allergies indicates:   Allergen Reactions    Tizanidine Shortness Of Breath       Stops breathing    Pollen extracts Other (See Comments)         Physical Examination          Vitals:     12/11/24 1149   BP: (!) 150/81   Pulse: 83   Temp: 98.3 °F (36.8 °C)         Spine Musculoskeletal Exam     Gait    Gait is normal.     General      Constitutional: appears stated age, well-developed and well-nourished    Scleral icterus: no    Labored breathing: no    Psychiatric: normal mood and affect and no acute distress    Neurological: alert and oriented x3    Skin: intact    Lymphadenopathy: none     CV: extremities warm, well-perfused  Resp: nonlabored breathing     Assessment and Plan (including Health Maintenance)       Problem List   Smart Sets   Document Outside HM   :     Plan:   Degeneration of intervertebral  disc of lumbar region with discogenic back pain     Lumbar spondylosis     Chronic back pain greater than 3 months duration     Bilateral occipital neuralgia     Coccydynia           Problem List Items Addressed This Visit                  Neuro     Lumbar spondylosis     Lumbar degenerative disc disease - Primary          Orthopedic     Chronic back pain greater than 3 months duration     Coccydynia     Bilateral occipital neuralgia

## 2025-01-13 NOTE — TRANSFER OF CARE
"Anesthesia Transfer of Care Note    Patient: Elizabeth Bruno    Procedure(s) Performed: Procedure(s) (LRB):  BLOCK, PIRIFORMIS OR COCCYGEAL    ////coccygeal ligament injection (N/A)    Patient location: OPS    Anesthesia Type: general    Transport from OR: Transported from OR on room air with adequate spontaneous ventilation    Post pain: adequate analgesia    Post assessment: no apparent anesthetic complications    Post vital signs: stable    Level of consciousness: awake, alert and oriented    Complications: none    Transfer of care protocol was followed      Last vitals: Visit Vitals  BP (!) 156/77   Pulse 70   Temp 36.7 °C (98 °F) (Oral)   Ht 5' 2" (1.575 m)   Wt 62.6 kg (138 lb)   SpO2 97%   Breastfeeding No   BMI 25.24 kg/m²     "

## 2025-01-13 NOTE — DISCHARGE SUMMARY
[facility] - Periop Services 2nd Floor  Discharge Note  Short Stay    Procedure(s) (LRB):  BLOCK, PIRIFORMIS OR COCCYGEAL    ////coccygeal ligament injection (N/A)      OUTCOME: Patient tolerated treatment/procedure well without complication and is now ready for discharge.    DISPOSITION: Home or Self Care    FINAL DIAGNOSIS:  <principal problem not specified>    FOLLOWUP: In clinic    DISCHARGE INSTRUCTIONS:  No discharge procedures on file.     TIME SPENT ON DISCHARGE: 5 minutes

## 2025-01-13 NOTE — DISCHARGE INSTRUCTIONS
Medial Branch Block After Care    You may take the bandage off and shower tomorrow. Check for signs and symptoms of infection daily: redness, warmth, pus or drainage, increase swelling, and foul odor.  Wash your hands before and after touching your puncture site.  No heavy lifting for 1 week.  No driving for 24 hours.  You may resume your regular diet today.  You may resume your regular activities tomorrow.  No heating pad on the puncture site for 24 hours. You may use an ice pack for pain or swelling for no longer than 15 minutes at a time for 3-4 times a day. Do not place ice directly on your skin.  No aspirin, blood thinners, or NSAIDs for 24 hours.    Contact your doctor for:  Increase pain not controlled with medication.  Any new numbness or tingling.  Fever greater than 101 degree Fahrenheit that does not break with medication.  Any signs or symptoms of infection: redness, warmth, pus or drainage, increase swelling, and foul odor at the puncture site.

## 2025-01-14 VITALS
DIASTOLIC BLOOD PRESSURE: 76 MMHG | OXYGEN SATURATION: 98 % | WEIGHT: 138 LBS | BODY MASS INDEX: 25.4 KG/M2 | RESPIRATION RATE: 17 BRPM | HEIGHT: 62 IN | SYSTOLIC BLOOD PRESSURE: 114 MMHG | HEART RATE: 68 BPM | TEMPERATURE: 98 F

## 2025-01-17 ENCOUNTER — PATIENT MESSAGE (OUTPATIENT)
Facility: CLINIC | Age: 80
End: 2025-01-17
Payer: MEDICARE

## 2025-01-26 NOTE — TELEPHONE ENCOUNTER
Please check with Leena, I believe these injections have to get pre-authorization but I'm not sure if it depends on insurance.  If we do need to get pre-auth, can push her appt back.

## 2025-01-28 ENCOUNTER — OFFICE VISIT (OUTPATIENT)
Facility: CLINIC | Age: 80
End: 2025-01-28
Payer: MEDICARE

## 2025-01-28 VITALS
BODY MASS INDEX: 25.76 KG/M2 | HEIGHT: 62 IN | SYSTOLIC BLOOD PRESSURE: 136 MMHG | WEIGHT: 140 LBS | DIASTOLIC BLOOD PRESSURE: 76 MMHG | HEART RATE: 60 BPM

## 2025-01-28 DIAGNOSIS — M53.3 COCCYDYNIA: ICD-10-CM

## 2025-01-28 DIAGNOSIS — M54.9 CHRONIC BACK PAIN GREATER THAN 3 MONTHS DURATION: ICD-10-CM

## 2025-01-28 DIAGNOSIS — M51.360 DEGENERATION OF INTERVERTEBRAL DISC OF LUMBAR REGION WITH DISCOGENIC BACK PAIN: Primary | ICD-10-CM

## 2025-01-28 DIAGNOSIS — G89.29 CHRONIC BACK PAIN GREATER THAN 3 MONTHS DURATION: ICD-10-CM

## 2025-01-28 DIAGNOSIS — M47.816 LUMBAR SPONDYLOSIS: ICD-10-CM

## 2025-01-28 PROCEDURE — 1101F PT FALLS ASSESS-DOCD LE1/YR: CPT | Mod: CPTII,,, | Performed by: ANESTHESIOLOGY

## 2025-01-28 PROCEDURE — 3075F SYST BP GE 130 - 139MM HG: CPT | Mod: CPTII,,, | Performed by: ANESTHESIOLOGY

## 2025-01-28 PROCEDURE — 1159F MED LIST DOCD IN RCRD: CPT | Mod: CPTII,,, | Performed by: ANESTHESIOLOGY

## 2025-01-28 PROCEDURE — 1125F AMNT PAIN NOTED PAIN PRSNT: CPT | Mod: CPTII,,, | Performed by: ANESTHESIOLOGY

## 2025-01-28 PROCEDURE — 99213 OFFICE O/P EST LOW 20 MIN: CPT | Mod: ,,, | Performed by: ANESTHESIOLOGY

## 2025-01-28 PROCEDURE — 1160F RVW MEDS BY RX/DR IN RCRD: CPT | Mod: CPTII,,, | Performed by: ANESTHESIOLOGY

## 2025-01-28 PROCEDURE — 3078F DIAST BP <80 MM HG: CPT | Mod: CPTII,,, | Performed by: ANESTHESIOLOGY

## 2025-01-28 PROCEDURE — 3288F FALL RISK ASSESSMENT DOCD: CPT | Mod: CPTII,,, | Performed by: ANESTHESIOLOGY

## 2025-01-28 RX ORDER — ZOLPIDEM TARTRATE 5 MG/1
5 TABLET ORAL
COMMUNITY
Start: 2024-08-08

## 2025-01-28 RX ORDER — HYDROCODONE BITARTRATE AND ACETAMINOPHEN 10; 325 MG/1; MG/1
TABLET ORAL
COMMUNITY

## 2025-01-28 NOTE — PROGRESS NOTES
Vicky Perez MD        PATIENT NAME: Elizabeth Bruno  : 1945  DATE: 25  MRN: [id number]      Billing Provider: Vicky Perez MD  Level of Service:   Patient PCP Information       Provider PCP Type    Nette Marroquin MD General            Reason for Visit / Chief Complaint: Post-op Evaluation (2 week post op Coceygeal ligament injection 25 C/O pain level 2, states procedure helped a lot, would like to discuss occipital nerve blocks. Pt would like a refill on  Gabapentin)       Update PCP  Update Chief Complaint         History of Present Illness / Problem Focused Workflow     Elizabeth Bruno presents to the clinic with Post-op Evaluation (2 week post op Coceygeal ligament injection 25 C/O pain level 2, states procedure helped a lot, would like to discuss occipital nerve blocks. Pt would like a refill on  Gabapentin)     This is a 79-year-old female who returns to clinic today for follow up of her chronic low back pain.  She underwent a coccygeal ligament injection a couple of weeks ago.  She states that it took a few days to start kicking in, but she is now getting great relief.  Today she is complaining more of neck and shoulder pain, which is making it difficult to drive.  She is also having pain radiating down the right upper extremity to her hand, also associated with numbness and tingling.         Pain  Associated symptoms include headaches, neck pain, numbness and weakness.   Low-back Pain  Associated symptoms include headaches, leg pain, numbness and weakness.   Back Pain  Associated symptoms include headaches, leg pain, numbness and weakness.   Neck Pain   Associated symptoms include headaches, leg pain, numbness and weakness.     Review of Systems     Review of Systems   Musculoskeletal:  Positive for back pain, gait problem, leg pain and neck pain.   Neurological:  Positive for weakness, numbness and headaches.   All other systems reviewed and are negative.       Medical /  Social / Family History     Past Medical History:   Diagnosis Date    Arthritis     Chronic back pain     GERD (gastroesophageal reflux disease)     Heart murmur     Herpes     HTN (hypertension)     Insomnia     OAB (overactive bladder)     Seasonal allergies     Syringomyelia        Past Surgical History:   Procedure Laterality Date    ANKLE SURGERY Right     bilatteral cataracts removed with lens implants Bilateral     BLOCK, PIRIFORMIS OR COCCYGEAL N/A 2025    Procedure: BLOCK, PIRIFORMIS OR COCCYGEAL    ////coccygeal ligament injection;  Surgeon: Vicky Perez MD;  [address];  Service: Pain Management;  Laterality: N/A;  COCCYGEAL LIGAMENT INJECTION    CARPAL TUNNEL RELEASE       SECTION      CHOLECYSTECTOMY      EPIDURAL STEROID INJECTION INTO CERVICAL SPINE N/A 2024    Procedure: INJECTION, STEROID, SPINE, CERVICAL, EPIDURAL  C7-T1;  Surgeon: Vicky Perez MD;  Location: [facility] OR;  Service: Pain Management;  Laterality: N/A;  KAREN C7-T1    eyelid surgery Bilateral     HERNIA REPAIR      HYSTERECTOMY      INJECTION N/A 2023    Procedure: Injection  coccygeal ligament injection;  Surgeon: Vicky Perez MD;  Location: [facility] OR;  Service: Pain Management;  Laterality: N/A;  coccygeal ligament injection    INJECTION N/A 2024    Procedure: Injection coccygeal ligament Injection;  Surgeon: Vicky Perez MD;  Location: [facility] OR;  Service: Pain Management;  Laterality: N/A;  coccygeal ligament Injection    INJECTION OF ANESTHETIC AGENT AROUND MEDIAL BRANCH NERVES INNERVATING CERVICAL FACET JOINT Bilateral 2023    Procedure: Block-nerve-medial branch-cervical;  Surgeon: Vicky Perez MD;  Location: [facility] OR;  Service: Pain Management;  Laterality: Bilateral;  Medial Branch Block.. C4-C6    INJECTION OF ANESTHETIC AGENT AROUND MEDIAL BRANCH NERVES INNERVATING LUMBAR FACET JOINT Bilateral 2023    Procedure: BLOCK, NERVE, FACET JOINT, LUMBAR, MEDIAL  BRANCH Bilateral L3-5;  Surgeon: Vicky Perez MD;  Location: [facility] OR;  Service: Pain Management;  Laterality: Bilateral;  L3-5    INJECTION OF ANESTHETIC AGENT AROUND MEDIAL BRANCH NERVES INNERVATING LUMBAR FACET JOINT Bilateral 07/05/2023    Procedure: Block-nerve-medial branch-lumbar;  Surgeon: Vicky Perez MD;  Location: [facility] OR;  Service: Pain Management;  Laterality: Bilateral;  Facet Joint Injection, Bilateral Lumbar L5-S1    INSERTION OF IMPLANTABLE LOOP RECORDER N/A     nonfunctioning    neck fusion      SPINAL CORD STIMULATOR IMPLANT      TONSILLECTOMY      TOTAL KNEE REPLACEMENT USING COMPUTER NAVIGATION Bilateral        Social History  Ms. Bruno  reports that she has never smoked. She has never used smokeless tobacco. She reports that she does not currently use alcohol. She reports that she does not use drugs.    Family History  Ms.'s Bruno family history includes Heart disease in her father and mother.    Medications and Allergies     Medications  Outpatient Medications Marked as Taking for the 1/28/25 encounter (Office Visit) with Vicky Perez MD   Medication Sig Dispense Refill    amLODIPine (NORVASC) 5 MG tablet Take 1 tablet by mouth once daily.      ascorbic acid, vitamin C, (VITAMIN C) 1000 MG tablet Take 1,000 mg by mouth once daily.      aspirin (ECOTRIN) 81 MG EC tablet Take 81 mg by mouth once daily.      B-complex with vitamin C (Z-BEC OR EQUIV) tablet Take 1 tablet by mouth once daily.      calcium carbonate (OS-CARI) 500 mg calcium (1,250 mg) tablet Take 1 tablet by mouth once daily.      calcium-vitamin D 600 mg-10 mcg (400 unit) Tab Take 1 tablet by mouth once daily.      carboxymethylcellulose (REFRESH PLUS) 0.5 % Dpet Apply 1 drop to eye daily as needed.      cetirizine (ZYRTEC) 10 MG tablet Take 10 mg by mouth once daily.      diclofenac sodium (VOLTAREN) 1 % Gel Apply topically daily as needed.      EScitalopram oxalate (LEXAPRO) 20 MG tablet Take 20 mg by mouth  nightly.      famotidine (PEPCID) 40 MG tablet Take 40 mg by mouth nightly.      fluticasone propionate (FLONASE) 50 mcg/actuation nasal spray 1 spray by Each Nostril route 2 (two) times a day.      furosemide (LASIX) 20 MG tablet Take 20 mg by mouth once daily.      gabapentin (NEURONTIN) 600 MG tablet TAKE 1 TABLET BY MOUTH THREE TIMES A DAY 90 tablet 2    glucosamine/chondr rivera A sod (OSTEO BI-FLEX ORAL) Take 1 tablet by mouth as needed.      HYDROcodone-acetaminophen (NORCO)  mg per tablet Take by mouth.      hyoscyamine (ANASPAZ,LEVSIN) 0.125 mg Tab Take 125 mcg by mouth 2 (two) times daily.      LIDOcaine (LIDODERM) 5 % Apply 1 patch topically per day as needed for pain. 30 patch 1    methocarbamoL (ROBAXIN) 500 MG Tab Take 500 mg by mouth as needed.      multivitamin with folic acid 400 mcg Tab Take 1 tablet by mouth once daily.      MYRBETRIQ 25 mg Tb24 ER tablet Take 25 mg by mouth once daily.      omega 3-dha-epa-fish oil 1,000 mg (120 mg-180 mg) Cap Take 1 capsule by mouth once daily.      pantoprazole (PROTONIX) 40 MG tablet Take 40 mg by mouth 2 (two) times daily.      traZODone (DESYREL) 50 MG tablet Take 50 mg by mouth nightly.      turmeric root extract 500 mg Cap Take 1 capsule by mouth once daily.      valACYclovir (VALTREX) 500 MG tablet Take 500 mg by mouth once daily.      VITAMIN B COMPLEX ORAL Take 1 tablet by mouth every morning.      vitamin E 400 UNIT capsule Take 400 Units by mouth once daily.      zolpidem (AMBIEN) 5 MG Tab Take 5 mg by mouth as needed.         Allergies  Review of patient's allergies indicates:   Allergen Reactions    Tizanidine Shortness Of Breath     Stops breathing    Other Reaction(s): Wheezing, Chest Tightness, Or Other Breathing Problems    Stopped patients breathing    Pollen extracts Other (See Comments)       Physical Examination     Vitals:    01/28/25 1142   BP: 136/76   Pulse: 60       Spine Musculoskeletal Exam    Gait    Gait is normal.    General       Constitutional: appears stated age, well-developed and well-nourished    Scleral icterus: no    Labored breathing: no    Psychiatric: normal mood and affect and no acute distress    Neurological: alert and oriented x3    Skin: intact    Lymphadenopathy: none     CV: extremities warm, well-perfused  Resp: nonlabored breathing    Assessment and Plan (including Health Maintenance)      Problem List  Smart Sets  Document Outside HM   :    Plan:   Degeneration of intervertebral disc of lumbar region with discogenic back pain  -     CT Cervical Spine Without Contrast; Future; Expected date: 01/28/2025    Lumbar spondylosis  -     CT Cervical Spine Without Contrast; Future; Expected date: 01/28/2025    Chronic back pain greater than 3 months duration  -     CT Cervical Spine Without Contrast; Future; Expected date: 01/28/2025    Coccydynia  -     CT Cervical Spine Without Contrast; Future; Expected date: 01/28/2025      I am getting a CT of her cervical spine today for further evaluation of her worsening neck pain radiating down the right upper extremity to her hand, which is also associated with numbness and tingling.  She would like to plan on having an injection to help with this, and would like to also schedule bilateral occipital nerve blocks at the same time.    Problem List Items Addressed This Visit          Neuro    Lumbar spondylosis    Relevant Orders    CT Cervical Spine Without Contrast    Lumbar degenerative disc disease - Primary    Relevant Orders    CT Cervical Spine Without Contrast       Orthopedic    Chronic back pain greater than 3 months duration    Relevant Orders    CT Cervical Spine Without Contrast    Coccydynia    Relevant Orders    CT Cervical Spine Without Contrast           Future Appointments   Date Time Provider Department Center   2/21/2025 10:15 AM Vicky Perez MD [facility]                  There are no Patient Instructions on file for this visit.  No follow-ups on  file.     Signature:  Vicky Perez MD      Date of encounter: 1/28/25

## (undated) DEVICE — SYR 10CC LUER LOCK

## (undated) DEVICE — APPLICATOR CHLORAPREP ORN 26ML

## (undated) DEVICE — DRAPE MEDIUM SHEET 40X70IN

## (undated) DEVICE — NDL HYPO REG 25G X 1 1/2

## (undated) DEVICE — SYR 3ML LL 18GA 1.5IN

## (undated) DEVICE — NDL SYR 10ML 18X1.5 LL BLUNT

## (undated) DEVICE — DRAPE UTILITY W/ TAPE 20X30IN

## (undated) DEVICE — BANDAGE SHEER STRIP 3/4X3IN

## (undated) DEVICE — SYR 3CC LUER LOC

## (undated) DEVICE — SET SMARTSITE EXT SMALLBORE NF

## (undated) DEVICE — Device

## (undated) DEVICE — KIT SURGICAL TURNOVER

## (undated) DEVICE — NDL FLTR 5MCRN BLNT TIP 18GX1

## (undated) DEVICE — GLOVE PROTEXIS LTX MICRO 6.5

## (undated) DEVICE — SYR DISP LL 5CC

## (undated) DEVICE — CONTRAST ISOVUE M 200 20ML VIL

## (undated) DEVICE — NDL SPINAL 22GA 3.5 IN QUINCKE

## (undated) DEVICE — CHLORAPREP 10.5 ML APPLICATOR

## (undated) DEVICE — POSITIONER HEAD ADULT

## (undated) DEVICE — GLOVE SIGNATURE MICRO LTX 6.5

## (undated) DEVICE — GLOVE PROTEXIS PI CRM 6.5

## (undated) DEVICE — SYR W NDL BLN FILL 5ML 18GX1.5

## (undated) DEVICE — ADAPTER DUAL NSL LUER M-M 7FT

## (undated) DEVICE — NDL EPIDURAL TOUHY 18G X3.5

## (undated) DEVICE — NDL SAFETY 25G X 1.5 ECLIPSE

## (undated) DEVICE — NDL QUINCKE S/SU 22GA 5IN

## (undated) DEVICE — CANNULA AIRLIFE ETCO2 NSL 7FT

## (undated) DEVICE — SYR EPILOR LUER-LOK LOR 7ML

## (undated) DEVICE — GLOVE SENSICARE PI SURG 6

## (undated) DEVICE — NDL BLUNT FILL 18G 1IN

## (undated) DEVICE — NDL QUINCKE S/SU 22GA 7IN

## (undated) DEVICE — SYR POSIFLUSH NACL PREFIL 10ML